# Patient Record
Sex: FEMALE | Race: WHITE | Employment: OTHER | URBAN - METROPOLITAN AREA
[De-identification: names, ages, dates, MRNs, and addresses within clinical notes are randomized per-mention and may not be internally consistent; named-entity substitution may affect disease eponyms.]

---

## 2017-01-11 ENCOUNTER — LAB ENCOUNTER (OUTPATIENT)
Dept: LAB | Age: 57
End: 2017-01-11
Attending: INTERNAL MEDICINE
Payer: COMMERCIAL

## 2017-01-11 DIAGNOSIS — I13.10: ICD-10-CM

## 2017-01-11 DIAGNOSIS — E83.42 HYPOMAGNESEMIA: Primary | ICD-10-CM

## 2017-01-11 LAB
ALBUMIN SERPL BCP-MCNC: 3.1 G/DL (ref 3.5–4.8)
ALP SERPL-CCNC: 87 U/L (ref 32–100)
ALT SERPL-CCNC: 15 U/L (ref 14–54)
ANION GAP SERPL CALC-SCNC: 10 MMOL/L (ref 0–18)
AST SERPL-CCNC: 19 U/L (ref 15–41)
BASOPHILS # BLD: 0 K/UL (ref 0–0.2)
BASOPHILS NFR BLD: 0 %
BILIRUB DIRECT SERPL-MCNC: 0 MG/DL (ref 0–0.2)
BILIRUB SERPL-MCNC: 0.5 MG/DL (ref 0.3–1.2)
BILIRUB UR QL: NEGATIVE
BUN SERPL-MCNC: 42 MG/DL (ref 8–20)
BUN/CREAT SERPL: 15.7 (ref 10–20)
CALCIUM SERPL-MCNC: 9.3 MG/DL (ref 8.5–10.5)
CHLORIDE SERPL-SCNC: 106 MMOL/L (ref 95–110)
CHOLEST SERPL-MCNC: 126 MG/DL (ref 110–200)
CLARITY UR: CLEAR
CO2 SERPL-SCNC: 23 MMOL/L (ref 22–32)
COLOR UR: YELLOW
CREAT SERPL-MCNC: 2.68 MG/DL (ref 0.5–1.5)
EOSINOPHIL # BLD: 0.1 K/UL (ref 0–0.7)
EOSINOPHIL NFR BLD: 2 %
ERYTHROCYTE [DISTWIDTH] IN BLOOD BY AUTOMATED COUNT: 14.7 % (ref 11–15)
GLUCOSE SERPL-MCNC: 134 MG/DL (ref 70–99)
GLUCOSE UR-MCNC: NEGATIVE MG/DL
HCT VFR BLD AUTO: 33.1 % (ref 35–48)
HDLC SERPL-MCNC: 41 MG/DL
HGB BLD-MCNC: 10.5 G/DL (ref 12–16)
HGB UR QL STRIP.AUTO: NEGATIVE
HYALINE CASTS #/AREA URNS AUTO: 1 /LPF
KETONES UR-MCNC: NEGATIVE MG/DL
LDLC SERPL CALC-MCNC: 65 MG/DL (ref 0–99)
LEUKOCYTE ESTERASE UR QL STRIP.AUTO: NEGATIVE
LYMPHOCYTES # BLD: 1.1 K/UL (ref 1–4)
LYMPHOCYTES NFR BLD: 22 %
MAGNESIUM SERPL-MCNC: 1.6 MG/DL (ref 1.8–2.5)
MCH RBC QN AUTO: 28.3 PG (ref 27–32)
MCHC RBC AUTO-ENTMCNC: 31.7 G/DL (ref 32–37)
MCV RBC AUTO: 89.3 FL (ref 80–100)
MONOCYTES # BLD: 0.4 K/UL (ref 0–1)
MONOCYTES NFR BLD: 8 %
NEUTROPHILS # BLD AUTO: 3.5 K/UL (ref 1.8–7.7)
NEUTROPHILS NFR BLD: 67 %
NITRITE UR QL STRIP.AUTO: NEGATIVE
NONHDLC SERPL-MCNC: 85 MG/DL
OSMOLALITY UR CALC.SUM OF ELEC: 300 MOSM/KG (ref 275–295)
PH UR: 7 [PH] (ref 5–8)
PLATELET # BLD AUTO: 191 K/UL (ref 140–400)
PMV BLD AUTO: 11.8 FL (ref 7.4–10.3)
POTASSIUM SERPL-SCNC: 5.5 MMOL/L (ref 3.3–5.1)
PROT SERPL-MCNC: 5.8 G/DL (ref 5.9–8.4)
PROT UR-MCNC: 100 MG/DL
PROT UR-MCNC: 210 MG/DL
PTH-INTACT SERPL-MCNC: 180 PG/ML (ref 12–88)
RBC # BLD AUTO: 3.71 M/UL (ref 3.7–5.4)
RBC #/AREA URNS AUTO: <1 /HPF
SODIUM SERPL-SCNC: 139 MMOL/L (ref 136–144)
SP GR UR STRIP: 1.01 (ref 1–1.03)
TRIGL SERPL-MCNC: 101 MG/DL (ref 1–149)
UROBILINOGEN UR STRIP-ACNC: <2
VIT C UR-MCNC: NEGATIVE MG/DL
WBC # BLD AUTO: 5.2 K/UL (ref 4–11)
WBC #/AREA URNS AUTO: <1 /HPF

## 2017-01-11 PROCEDURE — 80076 HEPATIC FUNCTION PANEL: CPT

## 2017-01-11 PROCEDURE — 80048 BASIC METABOLIC PNL TOTAL CA: CPT

## 2017-01-11 PROCEDURE — 84166 PROTEIN E-PHORESIS/URINE/CSF: CPT

## 2017-01-11 PROCEDURE — 84156 ASSAY OF PROTEIN URINE: CPT

## 2017-01-11 PROCEDURE — 83735 ASSAY OF MAGNESIUM: CPT

## 2017-01-11 PROCEDURE — 81001 URINALYSIS AUTO W/SCOPE: CPT

## 2017-01-11 PROCEDURE — 80158 DRUG ASSAY CYCLOSPORINE: CPT

## 2017-01-11 PROCEDURE — 86335 IMMUNFIX E-PHORSIS/URINE/CSF: CPT

## 2017-01-11 PROCEDURE — 82306 VITAMIN D 25 HYDROXY: CPT

## 2017-01-11 PROCEDURE — 36415 COLL VENOUS BLD VENIPUNCTURE: CPT

## 2017-01-11 PROCEDURE — 85025 COMPLETE CBC W/AUTO DIFF WBC: CPT

## 2017-01-11 PROCEDURE — 80061 LIPID PANEL: CPT

## 2017-01-11 PROCEDURE — 83970 ASSAY OF PARATHORMONE: CPT

## 2017-01-12 LAB — CYCLOSPORINE A: 29.4 NG/ML

## 2017-01-13 LAB — 25(OH)D3 SERPL-MCNC: 25.7 NG/ML

## 2017-02-26 ENCOUNTER — LAB ENCOUNTER (OUTPATIENT)
Dept: LAB | Facility: HOSPITAL | Age: 57
End: 2017-02-26
Attending: INTERNAL MEDICINE
Payer: COMMERCIAL

## 2017-02-26 DIAGNOSIS — D64.9 ANEMIA, UNSPECIFIED: ICD-10-CM

## 2017-02-26 DIAGNOSIS — Z94.83 PANCREAS TRANSPLANTED (HCC): ICD-10-CM

## 2017-02-26 DIAGNOSIS — Z94.0 KIDNEY TRANSPLANTED: ICD-10-CM

## 2017-02-26 DIAGNOSIS — T86.899 UNSPECIFIED COMPLICATION OF OTHER TRANSPLANTED TISSUE: ICD-10-CM

## 2017-02-26 DIAGNOSIS — Z79.899 ON LONG TERM DRUG THERAPY: ICD-10-CM

## 2017-02-26 DIAGNOSIS — R19.7 DIARRHEA, UNSPECIFIED TYPE: ICD-10-CM

## 2017-02-26 DIAGNOSIS — E55.9 VITAMIN D DEFICIENCY, UNSPECIFIED: ICD-10-CM

## 2017-02-26 DIAGNOSIS — T86.10 UNSPECIFIED COMPLICATION OF KIDNEY TRANSPLANT: ICD-10-CM

## 2017-02-26 LAB
ANION GAP SERPL CALC-SCNC: 7 MMOL/L (ref 0–18)
BASOPHILS # BLD: 0 K/UL (ref 0–0.2)
BASOPHILS NFR BLD: 0 %
BILIRUB UR QL: NEGATIVE
BUN SERPL-MCNC: 45 MG/DL (ref 8–20)
BUN/CREAT SERPL: 17 (ref 10–20)
CALCIUM SERPL-MCNC: 8.5 MG/DL (ref 8.5–10.5)
CHLORIDE SERPL-SCNC: 109 MMOL/L (ref 95–110)
CLARITY UR: CLEAR
CO2 SERPL-SCNC: 22 MMOL/L (ref 22–32)
COLOR UR: YELLOW
CREAT SERPL-MCNC: 2.65 MG/DL (ref 0.5–1.5)
EOSINOPHIL # BLD: 0.2 K/UL (ref 0–0.7)
EOSINOPHIL NFR BLD: 3 %
ERYTHROCYTE [DISTWIDTH] IN BLOOD BY AUTOMATED COUNT: 13.9 % (ref 11–15)
GLUCOSE SERPL-MCNC: 156 MG/DL (ref 70–99)
HCT VFR BLD AUTO: 32.4 % (ref 35–48)
HGB BLD-MCNC: 10.1 G/DL (ref 12–16)
HGB UR QL STRIP.AUTO: NEGATIVE
KETONES UR-MCNC: NEGATIVE MG/DL
LYMPHOCYTES # BLD: 1.1 K/UL (ref 1–4)
LYMPHOCYTES NFR BLD: 19 %
MCH RBC QN AUTO: 28.2 PG (ref 27–32)
MCHC RBC AUTO-ENTMCNC: 31.3 G/DL (ref 32–37)
MCV RBC AUTO: 90.2 FL (ref 80–100)
MONOCYTES # BLD: 0.4 K/UL (ref 0–1)
MONOCYTES NFR BLD: 7 %
NEUTROPHILS # BLD AUTO: 4.1 K/UL (ref 1.8–7.7)
NEUTROPHILS NFR BLD: 71 %
NITRITE UR QL STRIP.AUTO: NEGATIVE
OSMOLALITY UR CALC.SUM OF ELEC: 301 MOSM/KG (ref 275–295)
PH UR: 5 [PH] (ref 5–8)
PLATELET # BLD AUTO: 204 K/UL (ref 140–400)
PMV BLD AUTO: 11 FL (ref 7.4–10.3)
POTASSIUM SERPL-SCNC: 5.1 MMOL/L (ref 3.3–5.1)
PROT UR-MCNC: 100 MG/DL
PROT UR-MCNC: 325 MG/DL
RBC # BLD AUTO: 3.59 M/UL (ref 3.7–5.4)
RBC #/AREA URNS AUTO: 2 /HPF
SODIUM SERPL-SCNC: 138 MMOL/L (ref 136–144)
SP GR UR STRIP: 1.01 (ref 1–1.03)
UROBILINOGEN UR STRIP-ACNC: <2
VIT C UR-MCNC: NEGATIVE MG/DL
WBC # BLD AUTO: 5.8 K/UL (ref 4–11)
WBC #/AREA URNS AUTO: 3 /HPF

## 2017-02-26 PROCEDURE — 85025 COMPLETE CBC W/AUTO DIFF WBC: CPT

## 2017-02-26 PROCEDURE — 84166 PROTEIN E-PHORESIS/URINE/CSF: CPT

## 2017-02-26 PROCEDURE — 36415 COLL VENOUS BLD VENIPUNCTURE: CPT

## 2017-02-26 PROCEDURE — 81001 URINALYSIS AUTO W/SCOPE: CPT

## 2017-02-26 PROCEDURE — 86335 IMMUNFIX E-PHORSIS/URINE/CSF: CPT

## 2017-02-26 PROCEDURE — 80158 DRUG ASSAY CYCLOSPORINE: CPT

## 2017-02-26 PROCEDURE — 80048 BASIC METABOLIC PNL TOTAL CA: CPT

## 2017-02-28 ENCOUNTER — OFFICE VISIT (OUTPATIENT)
Dept: NEPHROLOGY | Facility: CLINIC | Age: 57
End: 2017-02-28

## 2017-02-28 VITALS
DIASTOLIC BLOOD PRESSURE: 80 MMHG | BODY MASS INDEX: 25 KG/M2 | SYSTOLIC BLOOD PRESSURE: 104 MMHG | RESPIRATION RATE: 16 BRPM | HEART RATE: 64 BPM | WEIGHT: 139 LBS

## 2017-02-28 DIAGNOSIS — I10 ESSENTIAL HYPERTENSION: ICD-10-CM

## 2017-02-28 DIAGNOSIS — N18.4 CKD (CHRONIC KIDNEY DISEASE), STAGE 4 (SEVERE): Primary | ICD-10-CM

## 2017-02-28 DIAGNOSIS — Z94.0 KIDNEY TRANSPLANT RECIPIENT: ICD-10-CM

## 2017-02-28 LAB — CYCLOSPORINE A: 36.8 NG/ML

## 2017-02-28 PROCEDURE — 99214 OFFICE O/P EST MOD 30 MIN: CPT | Performed by: INTERNAL MEDICINE

## 2017-02-28 RX ORDER — CYCLOSPORINE 25 MG/1
25 CAPSULE, LIQUID FILLED ORAL 2 TIMES DAILY
COMMUNITY
End: 2018-09-21 | Stop reason: ALTCHOICE

## 2017-02-28 RX ORDER — PENTAMIDINE ISETHIONATE 300 MG/300MG
300 INHALANT RESPIRATORY (INHALATION)
COMMUNITY
End: 2019-09-25

## 2017-02-28 RX ORDER — PRAVASTATIN SODIUM 20 MG
60 TABLET ORAL NIGHTLY
COMMUNITY

## 2017-02-28 NOTE — PROGRESS NOTES
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)      Clotting Factor 5   • Spondylolysis 2015   • Tendon tear, foot 2015     R foot   • Trigger finger, right little finger 2015   • Anemia 2014   • Trigger finger, left 2014 mycophenolate mofetil 250 MG Oral Cap Take 500 mg by mouth 2 (two) times daily. Disp:  Rfl:    Prasugrel HCl 10 MG Oral Tab Take 10 mg by mouth daily. Disp:  Rfl:    aspirin 81 MG Oral Tab Take 81 mg by mouth daily.  Disp:  Rfl:    Bisoprolol Fumarate 5 M Warm and dry, no rashes      Michael-Vidal Fernando Cha, MD  2/28/2016  156 PM

## 2017-04-30 ENCOUNTER — LAB ENCOUNTER (OUTPATIENT)
Dept: LAB | Facility: HOSPITAL | Age: 57
End: 2017-04-30
Attending: INTERNAL MEDICINE
Payer: COMMERCIAL

## 2017-04-30 DIAGNOSIS — Z94.0 KIDNEY TRANSPLANTED: ICD-10-CM

## 2017-04-30 DIAGNOSIS — Z79.899 ON LONG TERM DRUG THERAPY: ICD-10-CM

## 2017-04-30 PROCEDURE — 80158 DRUG ASSAY CYCLOSPORINE: CPT

## 2017-04-30 PROCEDURE — 83970 ASSAY OF PARATHORMONE: CPT

## 2017-04-30 PROCEDURE — 80061 LIPID PANEL: CPT

## 2017-04-30 PROCEDURE — 80048 BASIC METABOLIC PNL TOTAL CA: CPT

## 2017-04-30 PROCEDURE — 84100 ASSAY OF PHOSPHORUS: CPT

## 2017-04-30 PROCEDURE — 87799 DETECT AGENT NOS DNA QUANT: CPT

## 2017-04-30 PROCEDURE — 80076 HEPATIC FUNCTION PANEL: CPT

## 2017-04-30 PROCEDURE — 36415 COLL VENOUS BLD VENIPUNCTURE: CPT

## 2017-04-30 PROCEDURE — 82306 VITAMIN D 25 HYDROXY: CPT

## 2017-04-30 PROCEDURE — 83036 HEMOGLOBIN GLYCOSYLATED A1C: CPT

## 2017-04-30 PROCEDURE — 83735 ASSAY OF MAGNESIUM: CPT

## 2017-04-30 PROCEDURE — 84156 ASSAY OF PROTEIN URINE: CPT

## 2017-04-30 PROCEDURE — 85025 COMPLETE CBC W/AUTO DIFF WBC: CPT

## 2017-04-30 PROCEDURE — 82570 ASSAY OF URINE CREATININE: CPT

## 2017-08-31 ENCOUNTER — TELEPHONE (OUTPATIENT)
Dept: NEPHROLOGY | Facility: CLINIC | Age: 57
End: 2017-08-31

## 2017-08-31 ENCOUNTER — OFFICE VISIT (OUTPATIENT)
Dept: NEPHROLOGY | Facility: CLINIC | Age: 57
End: 2017-08-31

## 2017-08-31 VITALS — WEIGHT: 133 LBS | BODY MASS INDEX: 24 KG/M2

## 2017-08-31 DIAGNOSIS — T86.11 CHRONIC RENAL ALLOGRAFT NEPHROPATHY: ICD-10-CM

## 2017-08-31 DIAGNOSIS — N18.4 CKD (CHRONIC KIDNEY DISEASE) STAGE 4, GFR 15-29 ML/MIN (HCC): Primary | ICD-10-CM

## 2017-08-31 DIAGNOSIS — Z94.0 KIDNEY TRANSPLANT RECIPIENT: ICD-10-CM

## 2017-08-31 PROCEDURE — 99214 OFFICE O/P EST MOD 30 MIN: CPT | Performed by: INTERNAL MEDICINE

## 2017-08-31 NOTE — PROGRESS NOTES
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 Per pump Disp:  Rfl:    mycophenolate mofetil 250 MG Oral Cap Take 500 mg by mouth 2 (two) times daily. Disp:  Rfl:    Prasugrel HCl 10 MG Oral Tab Take 10 mg by mouth daily.  Disp:  Rfl:    aspirin 81 MG Oral Tab Ta without wheezes, rales, rhonchi. Abdomen: Soft, non-tender. + bowel sounds, no palpable organomegaly  Extremities: Without clubbing, cyanosis or edema.   Neurologic: Alert and oriented, normal affect, cranial nerves grossly intact, moving all extremities

## 2017-10-09 ENCOUNTER — TELEPHONE (OUTPATIENT)
Dept: NEPHROLOGY | Facility: CLINIC | Age: 57
End: 2017-10-09

## 2017-10-09 DIAGNOSIS — Z94.0 KIDNEY TRANSPLANT RECIPIENT: ICD-10-CM

## 2017-10-09 DIAGNOSIS — N18.4 CKD (CHRONIC KIDNEY DISEASE) STAGE 4, GFR 15-29 ML/MIN (HCC): Primary | ICD-10-CM

## 2017-11-08 ENCOUNTER — TELEPHONE (OUTPATIENT)
Dept: NEPHROLOGY | Facility: CLINIC | Age: 57
End: 2017-11-08

## 2017-11-08 ENCOUNTER — LAB ENCOUNTER (OUTPATIENT)
Dept: LAB | Age: 57
End: 2017-11-08
Attending: INTERNAL MEDICINE
Payer: COMMERCIAL

## 2017-11-08 DIAGNOSIS — N18.4 CHRONIC KIDNEY DISEASE, STAGE IV (SEVERE) (HCC): ICD-10-CM

## 2017-11-08 DIAGNOSIS — Z94.0 S/P KIDNEY TRANSPLANT: ICD-10-CM

## 2017-11-08 PROCEDURE — 80158 DRUG ASSAY CYCLOSPORINE: CPT

## 2017-11-08 PROCEDURE — 36415 COLL VENOUS BLD VENIPUNCTURE: CPT

## 2017-11-08 PROCEDURE — 80048 BASIC METABOLIC PNL TOTAL CA: CPT

## 2017-11-08 PROCEDURE — 85025 COMPLETE CBC W/AUTO DIFF WBC: CPT

## 2017-11-11 ENCOUNTER — TELEPHONE (OUTPATIENT)
Dept: NEPHROLOGY | Facility: CLINIC | Age: 57
End: 2017-11-11

## 2017-11-27 ENCOUNTER — TELEPHONE (OUTPATIENT)
Dept: TRANSPLANT | Facility: CLINIC | Age: 57
End: 2017-11-27

## 2017-11-27 DIAGNOSIS — I25.10 CARDIOVASCULAR DISEASE: ICD-10-CM

## 2017-11-27 DIAGNOSIS — E10.9 TYPE 1 DIABETES MELLITUS (H): ICD-10-CM

## 2017-11-27 DIAGNOSIS — I73.9 PVD (PERIPHERAL VASCULAR DISEASE) (H): ICD-10-CM

## 2017-11-27 DIAGNOSIS — N18.9 CHRONIC RENAL FAILURE: ICD-10-CM

## 2017-11-27 DIAGNOSIS — Z85.828 HISTORY OF SKIN CANCER: ICD-10-CM

## 2017-11-27 DIAGNOSIS — T86.12 KIDNEY TRANSPLANT FAILURE: ICD-10-CM

## 2017-11-27 DIAGNOSIS — Z76.82 ORGAN TRANSPLANT CANDIDATE: ICD-10-CM

## 2017-11-27 NOTE — TELEPHONE ENCOUNTER
Shadia called back and was extremely rude to me and hung up on me because I asker her who all her doctors were.

## 2017-11-27 NOTE — TELEPHONE ENCOUNTER
recieved an email from Bessie Fam stating that patient needs to be evaluated for kidney transplant, I attempted to contact her and reached her VM. I LM with my name, number and hours

## 2017-11-27 NOTE — LETTER
11/28/17    Shadia Rosario  [address] [place] [address]          Dear Shadia,    Thank you for your interest in the Transplant Center at [facility]. We look forward to being a part your care team and assisting you through the transplant process.    As we discussed, your transplant coordinator is Bessie Zhang ([phone]).  You may call your coordinator at any time with questions or concerns. Your first scheduled call will be on 12/11/2017 between 2-4pm and your tentative evaluation date is 3/19/2018.  If this needs to change, call [phone].    Please complete the following.    1. Sign up for:    [employer], your electronic medical record    [employer], the Transplant Center's website (see enclosed booklet)    You can use these tools to learn more about your transplant, communicate with your care team, and track your medical details    2. Fill out and return the enclosed forms    Authorization for Electronic Communication    Authorization to Discuss Protected Health Information    [employer] Release of Information       Best Wishes,      Solid Organ Transplant Intake  [facility]    cc: Referring Physician        PCP

## 2017-11-28 NOTE — TELEPHONE ENCOUNTER
Intake Progress Note  Nurse Call: 2017 2-4pm on cell phone  Save the Date: 3/19/2018 w/?    Organ:  Kidney    Referral Came Via phone call from patient    Referring Physician (outside provider, if patient does not remember the name of provider contact clinic or dialysis unit to get this information) :  Dr. Karla Packer in  [place]  (If inside referral, ask who their community nephrologist is that sent the to [facility])   (PCP)Chano Link  Assigned Coordinator:  Bessie Zhang    Reported Diagnosis that caused the kidney failure ( not CKD)  Kidney Failure, second assessment    Best time patient can be reached:  Anytime on cell    How would you like to be communicated with, through MyChart, phone, or mail? phone      Records:  [facility] requested from: [facility]    Insurance information:  BCBS/Medicare  Policy murrieta:  Pk Sorto  1961, Self-Medicare  Subscriber/policy/ID number:  [id number]  Group Number:  [id number]    History of diabetes:  yes  Type one  If yes, age of onset:  4 yrs old    Do you have an endocrinologist?: yes  Name and Location:  [facility]         On insulin or oral medication:  Yes on oral meds          What type of insulin and how many units? Pump 19 units a day of Novalog          History of a kidney biopsy:  Yes,          If Yes,when and where:   @ [facility]    Past Medical and Surgical History (updated in Epic medical / surgical):             History of  cancer personally:  Yes Wrist that was removed What type? Cancer on WristWrist that was  removed                Where and when was it treated? Dermatologist @ [facility] in  [place], [place]                            History of cardiac events:  yes             When and where was it was it treated?  heart attack, x2 stents placement  [facility] in       [place], [place]             History abdominal surgeries other than previous transplant: yes What type?  Gallbladder removed              Where and when? 2011  [facility]              History of previous transplant: yes             If Yes where and estimated date: [facility] 1996             Listed or in eval at another transplant center?  Yes, [facility]             History of hospitalization in last 12 months:  no               If Yes:  n/a               History of blood transfusion:  yes               Smoking history:  no     Current smoker:  No  How much? n/a      Quit Date:  n/a    On dialysis: no Where:  n/a                 Type of Dialysis: n/a   Start date: n/a       Dialysis Days:  n/a       If NYOD, estimated GFR:  ?  Height:  5'3  Weight:  129  BMI:  23    Health Maintenance:  PAP:  2017  Mammo: 2017  Colon: 9/ 2017  PSA:  n/a  Dental: 10/2017  Vaccines: Up to date  Special Needs (ie--wheelchair, assistance, guardian, interpretor):  no    As for the next steps, as long as we are able to get financial approval and receive your medical records, you should be expecting a call from your Transplant Coordinator in about 2 weeks. Your Transplant Coordinator will go into more detail about the evaluation process, but we can put a hold on a tentative date for your transplant evaluation now. Kidney (K/P) evaluations are scheduled for Monday, Wednesday, or Thursdays, and can start as early as 6:30 am and end as late as 4:30pm. Would one of these days work better for you? Great, I am going to put a hold on Day/Month for you. Again, this appointment day and time is subject to change and is dependent on financial approval from your insurance company and our receiving your medical records so we are able to meet your individual needs.    I also want to schedule your first call with the coordinator. (Offer a couple choices and schedule patient's preferred date and time.)      Inform patient on the need to arrange age appropriate cancer screening, vaccines up to date and dental clearance    Reviewed evaluation  process and reminded patient to complete questionnaire, complete medical records release, and review packet prior to evaluation visit     Informed patient that coordinator will review their chart and insurance coverage and if no concerns they will receive a call from a  to schedule evaluation

## 2017-11-29 NOTE — TELEPHONE ENCOUNTER
Intake Progress Note  Nurse Call: 2017 2-4pm on cell phone  Save the Date: 3/19/2018 w/?    Organ:  Kidney    Referral Came Via phone call from patient    Referring Physician (outside provider, if patient does not remember the name of provider contact clinic or dialysis unit to get this information) :  Dr. Karla Packer in  [place]  (If inside referral, ask who their community nephrologist is that sent the to [facility])   (PCP)Chano Link  Assigned Coordinator:  Bessie Zhang    Reported Diagnosis that caused the kidney failure ( not CKD)  Kidney Failure, second assessment    Best time patient can be reached:  Anytime on cell    How would you like to be communicated with, through MyChart, phone, or mail? phone      Records:  [facility] requested from: [facility]    Insurance information:  BCBS/Medicare  Policy murrieta:  Pk Sorto  1961, Self-Medicare  Subscriber/policy/ID number:  [id number]/ [id number]  Group Number:  [id number]    History of diabetes:  yes  Type one  If yes, age of onset:  4 yrs old    Do you have an endocrinologist?: yes  Name and Location:  [facility]         On insulin or oral medication:  Yes on oral meds          What type of insulin and how many units? Pump 19 units a day of Novalog          History of a kidney biopsy:  Yes,          If Yes,when and where:   @ [facility]    Past Medical and Surgical History (updated in Epic medical / surgical):             History of  cancer personally:  Yes Wrist that was removed What type? Cancer on WristWrist that was  removed                Where and when was it treated? Dermatologist @ [facility] in  [place], [place]                            History of cardiac events:  yes             When and where was it was it treated?  heart attack, x2 stents placement  [facility] in       [place], [place]             History abdominal surgeries other than previous transplant: yes What type?  Gallbladder removed              Where and when? 2011  [facility]              History of previous transplant: yes             If Yes where and estimated date: [facility] 1996             Listed or in eval at another transplant center?  Yes, [facility]             History of hospitalization in last 12 months:  no               If Yes:  n/a               History of blood transfusion:  yes               Smoking history:  no     Current smoker:  No  How much? n/a      Quit Date:  n/a    On dialysis: no Where:  n/a                 Type of Dialysis: n/a   Start date: n/a       Dialysis Days:  n/a       If NYOD, estimated GFR:  ?  Height:  5'3  Weight:  129  BMI:  23    Health Maintenance:  PAP:  2017  Mammo: 2017  Colon: 9/ 2017  PSA:  n/a  Dental: 10/2017  Vaccines: Up to date  Special Needs (ie--wheelchair, assistance, guardian, interpretor):  no    As for the next steps, as long as we are able to get financial approval and receive your medical records, you should be expecting a call from your Transplant Coordinator in about 2 weeks. Your Transplant Coordinator will go into more detail about the evaluation process, but we can put a hold on a tentative date for your transplant evaluation now. Kidney (K/P) evaluations are scheduled for Monday, Wednesday, or Thursdays, and can start as early as 6:30 am and end as late as 4:30pm. Would one of these days work better for you? Great, I am going to put a hold on Day/Month for you. Again, this appointment day and time is subject to change and is dependent on financial approval from your insurance company and our receiving your medical records so we are able to meet your individual needs.    I also want to schedule your first call with the coordinator. (Offer a couple choices and schedule patient's preferred date and time.)      Inform patient on the need to arrange age appropriate cancer screening, vaccines up to date and dental clearance    Reviewed evaluation  process and reminded patient to complete questionnaire, complete medical records release, and review packet prior to evaluation visit     Informed patient that coordinator will review their chart and insurance coverage and if no concerns they will receive a call from a  to schedule evaluation

## 2017-11-30 ENCOUNTER — MEDICAL CORRESPONDENCE (OUTPATIENT)
Dept: TRANSPLANT | Facility: CLINIC | Age: 57
End: 2017-11-30

## 2017-12-01 ENCOUNTER — MEDICAL CORRESPONDENCE (OUTPATIENT)
Dept: TRANSPLANT | Facility: CLINIC | Age: 57
End: 2017-12-01

## 2017-12-06 ENCOUNTER — MEDICAL CORRESPONDENCE (OUTPATIENT)
Dept: TRANSPLANT | Facility: CLINIC | Age: 57
End: 2017-12-06

## 2017-12-07 ENCOUNTER — MEDICAL CORRESPONDENCE (OUTPATIENT)
Dept: TRANSPLANT | Facility: CLINIC | Age: 57
End: 2017-12-07

## 2017-12-07 ENCOUNTER — TELEPHONE (OUTPATIENT)
Dept: TRANSPLANT | Facility: CLINIC | Age: 57
End: 2017-12-07

## 2017-12-11 NOTE — TELEPHONE ENCOUNTER
"Conversation with Shadia who explains her situation.  She has been working with [facility] transplant and says she has an approved living donor but she does not want to be transplanted there because they use Prednisone and she does not want to take that ever again.  She states she is not a candidate to be listed at [facility] because her vascular problems are too severe to be a candidate for an unplanned  donor transplant.  She says that [facility] told her that she would have to be opened before the living donor in case they can't find a spot.  She says they verbalized that they think there is a spot to place another transplant on her right side (current kidney is on the left).  I told her we would get the image of the CT she had 2017 which shows her severe atherosclerotic vessels and I would have our surgeons review before she travels here.  She is not requiring dialysis yet (GFR around 16).  She has had several cardiac stents and had a heart cath at [facility] in [place] in October and saw her own Cardiologist there; no intervention recommended (his note doesn't exactly state \"cleared for transplant\").  She says she hasn't talked to anyone at [facility] since this was performed so assumes they accept it.  She asked if she could get cardiac clearance here without actually having to see a cardiologist; I told her she would have to be seen in clinic in order for them to gustavo clearance but they will have the cath film from October to review.  She wants to come to [place] the week of  because her  is on spring break ().  I described the typical evaluation process.  She will need 2 consecutive days.  Orders to .    Health maintenance:  She says she is up to date with pap/Eli to request.  Mammogram negative May 2017 (report here).  Colonoscopy 2017 (report here).  She reports being up to date with dental (I need to review this with her).  Vaccines; we have documentation of " "Pneumovax 2016 and she reports that she is in process of completing the Hep B series.  *She has h/o skin CA and says she is up to date with seeing her Dermatologist/Eli to request records.      *I reminded Shadia that she has been on our Pancreas list (on hold) for many years and asked her what her intentions were regarding that now that needs another kidney transplant; I asked her if she was thinking of SPK or [facility]UEL?  She states that she is aware that her vascular disease is too serious now to make a pancreas transplant possible to she is no longer interested in that.  So I made the following statement to her:  \"So you are telling me to remove you from the Pancreas list?\"   She stated:  \"Yes, remove me completely from the Pancreas list.\"  I shared this message with Kaitlyn Alcocer.  "

## 2017-12-13 ENCOUNTER — MEDICAL CORRESPONDENCE (OUTPATIENT)
Dept: TRANSPLANT | Facility: CLINIC | Age: 57
End: 2017-12-13

## 2017-12-13 ENCOUNTER — COMMITTEE REVIEW (OUTPATIENT)
Dept: TRANSPLANT | Facility: CLINIC | Age: 57
End: 2017-12-13

## 2017-12-13 ENCOUNTER — TELEPHONE (OUTPATIENT)
Dept: TRANSPLANT | Facility: CLINIC | Age: 57
End: 2017-12-13

## 2017-12-13 NOTE — COMMITTEE REVIEW
Abdominal Patient Discussion Note Transplant Coordinator: Zuleima Barnes  Transplant Surgeon:       Referring Physician:     Committee Review Members:  Nephrology Saud Ferreira MD, Luis Zavala, APRN CNP, Romel Sharma MD   Nutrition Kaylee Ware,    Pharmacy Evelyn Regalado, [facility]    - Clinical Mónica Morales, [facility], Merary Clark, [facility]   Transplant Anastasia Bell PA-C, Adrien Garcia MD, Nga Adam LPN, Eugenie Ramos RN, Betsy Segura MD, Mauricio Lewis MD, Azeb Balderrama RN       Additional Discussion Notes and Findings:  Patient remains listed inactive status on the pancreas list per her choice and is now self-referred for another kidney transplant. Reviewed patient's conversation with pre-kidney coordinator in which patient requested to be removed from the pancreas transplant wait list.  Team agrees with patient's decision to be removed from the pancreas transplant wait list.

## 2017-12-13 NOTE — TELEPHONE ENCOUNTER
Updated patient that I will be removing her name from the [facility] pancreas transplant list per her request and that I will send patient a letter confirming removal from pancreas list on 12/13/17.   Patient verbalizes understanding and is agreeable with this plan

## 2017-12-13 NOTE — LETTER
December 13, 2017    Shadia Rosario  [address] [place] [address]      Dear Ms. Rosario,    The purpose of this letter is to let you know that per your request, your name was removed from the Pancreas transplant list at the [facility] on December 13, 2017.   We recommend that you continue to follow up with your primary care and referring physicians in order to manage your health concerns.  Enclosed is a letter from [facility] which describes the services offered to patients by [facility] and the [facility].  If you have any questions regarding this letter, please contact me at [phone].  Thank you for allowing us to participate in your care.      Sincerely,  Kaitlyn Alcocer, RN, BSN  Transplant Coordinator    Enclosure:  [facility] Letter

## 2017-12-14 ENCOUNTER — MEDICAL CORRESPONDENCE (OUTPATIENT)
Dept: TRANSPLANT | Facility: CLINIC | Age: 57
End: 2017-12-14

## 2017-12-15 ENCOUNTER — MEDICAL CORRESPONDENCE (OUTPATIENT)
Dept: TRANSPLANT | Facility: CLINIC | Age: 57
End: 2017-12-15

## 2018-01-10 ENCOUNTER — TELEPHONE (OUTPATIENT)
Dept: TRANSPLANT | Facility: CLINIC | Age: 58
End: 2018-01-10

## 2018-01-10 NOTE — TELEPHONE ENCOUNTER
Message on my voicemail from Shadia.  She states she wants to cancel her evaluation that is scheduled here for March 2018.  She states she has decided to work with [facility].  Message to Devon.  This case closed/resolved.

## 2018-01-29 ENCOUNTER — TELEPHONE (OUTPATIENT)
Dept: NEPHROLOGY | Facility: CLINIC | Age: 58
End: 2018-01-29

## 2018-01-30 NOTE — TELEPHONE ENCOUNTER
D/w pt- stopping magnesium did not help diarrhea; ok to use colestipol; to call back in 2 weeks with onelia- stephane llamas

## 2018-02-10 ENCOUNTER — LAB ENCOUNTER (OUTPATIENT)
Dept: LAB | Age: 58
End: 2018-02-10
Attending: INTERNAL MEDICINE
Payer: COMMERCIAL

## 2018-02-10 DIAGNOSIS — Z94.0 KIDNEY TRANSPLANTED: ICD-10-CM

## 2018-02-10 DIAGNOSIS — N18.4 CHRONIC KIDNEY DISEASE, STAGE IV (SEVERE) (HCC): ICD-10-CM

## 2018-02-10 DIAGNOSIS — Z79.899 ON LONG TERM DRUG THERAPY: ICD-10-CM

## 2018-02-10 DIAGNOSIS — Z94.0 S/P KIDNEY TRANSPLANT: ICD-10-CM

## 2018-02-10 LAB
ALBUMIN SERPL BCP-MCNC: 3 G/DL (ref 3.5–4.8)
ALP SERPL-CCNC: 89 U/L (ref 32–100)
ALT SERPL-CCNC: 18 U/L (ref 14–54)
ANION GAP SERPL CALC-SCNC: 11 MMOL/L (ref 0–18)
AST SERPL-CCNC: 17 U/L (ref 15–41)
BASOPHILS # BLD: 0 K/UL (ref 0–0.2)
BASOPHILS NFR BLD: 1 %
BILIRUB DIRECT SERPL-MCNC: 0.1 MG/DL (ref 0–0.2)
BILIRUB SERPL-MCNC: 0.5 MG/DL (ref 0.3–1.2)
BUN SERPL-MCNC: 49 MG/DL (ref 8–20)
BUN/CREAT SERPL: 14.9 (ref 10–20)
CALCIUM SERPL-MCNC: 8.2 MG/DL (ref 8.5–10.5)
CHLORIDE SERPL-SCNC: 110 MMOL/L (ref 95–110)
CHOLEST SERPL-MCNC: 93 MG/DL (ref 110–200)
CO2 SERPL-SCNC: 19 MMOL/L (ref 22–32)
CREAT SERPL-MCNC: 3.28 MG/DL (ref 0.5–1.5)
EOSINOPHIL # BLD: 0.2 K/UL (ref 0–0.7)
EOSINOPHIL NFR BLD: 4 %
ERYTHROCYTE [DISTWIDTH] IN BLOOD BY AUTOMATED COUNT: 15.2 % (ref 11–15)
GLUCOSE SERPL-MCNC: 148 MG/DL (ref 70–99)
HBA1C MFR BLD: 6.6 % (ref 4–6)
HCT VFR BLD AUTO: 28.2 % (ref 35–48)
HDLC SERPL-MCNC: 33 MG/DL
HGB BLD-MCNC: 9 G/DL (ref 12–16)
LDLC SERPL CALC-MCNC: 40 MG/DL (ref 0–99)
LYMPHOCYTES # BLD: 0.9 K/UL (ref 1–4)
LYMPHOCYTES NFR BLD: 21 %
MAGNESIUM SERPL-MCNC: 1.1 MG/DL (ref 1.8–2.5)
MCH RBC QN AUTO: 28.5 PG (ref 27–32)
MCHC RBC AUTO-ENTMCNC: 31.9 G/DL (ref 32–37)
MCV RBC AUTO: 89.2 FL (ref 80–100)
MONOCYTES # BLD: 0.3 K/UL (ref 0–1)
MONOCYTES NFR BLD: 7 %
NEUTROPHILS # BLD AUTO: 3.1 K/UL (ref 1.8–7.7)
NEUTROPHILS NFR BLD: 68 %
NONHDLC SERPL-MCNC: 60 MG/DL
OSMOLALITY UR CALC.SUM OF ELEC: 306 MOSM/KG (ref 275–295)
PHOSPHATE SERPL-MCNC: 4.2 MG/DL (ref 2.4–4.7)
PLATELET # BLD AUTO: 173 K/UL (ref 140–400)
PMV BLD AUTO: 11.4 FL (ref 7.4–10.3)
POTASSIUM SERPL-SCNC: 5.1 MMOL/L (ref 3.3–5.1)
PROT SERPL-MCNC: 5.1 G/DL (ref 5.9–8.4)
RBC # BLD AUTO: 3.16 M/UL (ref 3.7–5.4)
SODIUM SERPL-SCNC: 140 MMOL/L (ref 136–144)
TRIGL SERPL-MCNC: 100 MG/DL (ref 1–149)
WBC # BLD AUTO: 4.5 K/UL (ref 4–11)

## 2018-02-10 PROCEDURE — 85025 COMPLETE CBC W/AUTO DIFF WBC: CPT

## 2018-02-10 PROCEDURE — 87799 DETECT AGENT NOS DNA QUANT: CPT

## 2018-02-10 PROCEDURE — 36415 COLL VENOUS BLD VENIPUNCTURE: CPT

## 2018-02-10 PROCEDURE — 80076 HEPATIC FUNCTION PANEL: CPT

## 2018-02-10 PROCEDURE — 83036 HEMOGLOBIN GLYCOSYLATED A1C: CPT

## 2018-02-10 PROCEDURE — 80158 DRUG ASSAY CYCLOSPORINE: CPT

## 2018-02-10 PROCEDURE — 83970 ASSAY OF PARATHORMONE: CPT

## 2018-02-10 PROCEDURE — 80061 LIPID PANEL: CPT

## 2018-02-10 PROCEDURE — 83735 ASSAY OF MAGNESIUM: CPT

## 2018-02-10 PROCEDURE — 80048 BASIC METABOLIC PNL TOTAL CA: CPT

## 2018-02-10 PROCEDURE — 82306 VITAMIN D 25 HYDROXY: CPT

## 2018-02-10 PROCEDURE — 84100 ASSAY OF PHOSPHORUS: CPT

## 2018-02-12 LAB
25(OH)D3 SERPL-MCNC: 25.6 NG/ML
PTH-INTACT SERPL-MCNC: 388.4 PG/ML (ref 12–88)

## 2018-02-13 ENCOUNTER — OFFICE VISIT (OUTPATIENT)
Dept: NEPHROLOGY | Facility: CLINIC | Age: 58
End: 2018-02-13

## 2018-02-13 VITALS — WEIGHT: 129 LBS | BODY MASS INDEX: 23 KG/M2

## 2018-02-13 DIAGNOSIS — E83.42 HYPOMAGNESEMIA: ICD-10-CM

## 2018-02-13 DIAGNOSIS — Z94.0 KIDNEY TRANSPLANT RECIPIENT: Primary | ICD-10-CM

## 2018-02-13 DIAGNOSIS — N18.4 CKD (CHRONIC KIDNEY DISEASE) STAGE 4, GFR 15-29 ML/MIN (HCC): ICD-10-CM

## 2018-02-13 DIAGNOSIS — R19.7 DIARRHEA, UNSPECIFIED TYPE: ICD-10-CM

## 2018-02-13 DIAGNOSIS — I10 ESSENTIAL HYPERTENSION: ICD-10-CM

## 2018-02-13 LAB — CYCLOSPORINE A: 29.5 NG/ML

## 2018-02-13 PROCEDURE — 99215 OFFICE O/P EST HI 40 MIN: CPT | Performed by: INTERNAL MEDICINE

## 2018-02-13 NOTE — PROGRESS NOTES
Nephrology Progress Note      ASSESSMENT/PLAN:        1) s/p renal transplant 1996- gradually declining renal function with Cr approx 2.7 mg/dl is due to chronic allograft nephropathy + diabetic nephropathy + calcineurin-inhibitor toxicity; previous evalua months ago for recurrent pneumonia. Denies any other significant changes in her overall health. Medications otherwise unchanged although cyclosporine dose is somewhat lower. Antihypertensive regimen is the same.   She generally feels reasonably well but visit):    Current Outpatient Prescriptions:  cycloSPORINE modified (NEORAL) 25 MG Oral Cap Take 25 mg by mouth 2 (two) times daily. Disp:  Rfl:    Pravastatin Sodium (PRAVACHOL) 20 MG Oral Tab Take 60 mg by mouth nightly.  Disp:  Rfl:    Pentamidine Isalexisi (See Comments)  Ibuprofen                   Comment:Pt s/p kidney transplant  Sulfa Antibiotics       Rash    ROS:     Denies fever/chills  Denies wt loss/gain  Denies HA or visual changes  Denies CP or palpitations  Denies SOB/cough/hemoptysis  Denies abd

## 2018-02-14 LAB
BK VIRUS DNA, QUANT COPY/ML: <390 CPY/ML
BK VIRUS DNA, QUANT INTERP: NOT DETECTED
BK VIRUS DNA, QUANTITATION: <2.6 LOG

## 2018-02-20 ENCOUNTER — LAB ENCOUNTER (OUTPATIENT)
Dept: LAB | Age: 58
End: 2018-02-20
Attending: INTERNAL MEDICINE
Payer: COMMERCIAL

## 2018-02-20 ENCOUNTER — PATIENT MESSAGE (OUTPATIENT)
Dept: NEPHROLOGY | Facility: CLINIC | Age: 58
End: 2018-02-20

## 2018-02-20 DIAGNOSIS — N18.4 CHRONIC KIDNEY DISEASE, STAGE IV (SEVERE) (HCC): ICD-10-CM

## 2018-02-20 DIAGNOSIS — E83.42 HYPOMAGNESEMIA: Primary | ICD-10-CM

## 2018-02-20 DIAGNOSIS — Z94.0 S/P KIDNEY TRANSPLANT: ICD-10-CM

## 2018-02-20 LAB
ANION GAP SERPL CALC-SCNC: 8 MMOL/L (ref 0–18)
BASOPHILS # BLD: 0 K/UL (ref 0–0.2)
BASOPHILS NFR BLD: 0 %
BUN SERPL-MCNC: 36 MG/DL (ref 8–20)
BUN/CREAT SERPL: 12.1 (ref 10–20)
CALCIUM SERPL-MCNC: 8.2 MG/DL (ref 8.5–10.5)
CHLORIDE SERPL-SCNC: 110 MMOL/L (ref 95–110)
CO2 SERPL-SCNC: 23 MMOL/L (ref 22–32)
CREAT SERPL-MCNC: 2.98 MG/DL (ref 0.5–1.5)
EOSINOPHIL # BLD: 0.2 K/UL (ref 0–0.7)
EOSINOPHIL NFR BLD: 5 %
ERYTHROCYTE [DISTWIDTH] IN BLOOD BY AUTOMATED COUNT: 15.1 % (ref 11–15)
GLUCOSE SERPL-MCNC: 112 MG/DL (ref 70–99)
HCT VFR BLD AUTO: 27.5 % (ref 35–48)
HGB BLD-MCNC: 8.6 G/DL (ref 12–16)
LYMPHOCYTES # BLD: 0.8 K/UL (ref 1–4)
LYMPHOCYTES NFR BLD: 19 %
MAGNESIUM SERPL-MCNC: 0.9 MG/DL (ref 1.8–2.5)
MCH RBC QN AUTO: 27.9 PG (ref 27–32)
MCHC RBC AUTO-ENTMCNC: 31.2 G/DL (ref 32–37)
MCV RBC AUTO: 89.5 FL (ref 80–100)
MONOCYTES # BLD: 0.3 K/UL (ref 0–1)
MONOCYTES NFR BLD: 8 %
NEUTROPHILS # BLD AUTO: 2.8 K/UL (ref 1.8–7.7)
NEUTROPHILS NFR BLD: 68 %
OSMOLALITY UR CALC.SUM OF ELEC: 301 MOSM/KG (ref 275–295)
PLATELET # BLD AUTO: 234 K/UL (ref 140–400)
PMV BLD AUTO: 10.7 FL (ref 7.4–10.3)
POTASSIUM SERPL-SCNC: 5.1 MMOL/L (ref 3.3–5.1)
RBC # BLD AUTO: 3.07 M/UL (ref 3.7–5.4)
SODIUM SERPL-SCNC: 141 MMOL/L (ref 136–144)
WBC # BLD AUTO: 4.1 K/UL (ref 4–11)

## 2018-02-20 PROCEDURE — 80158 DRUG ASSAY CYCLOSPORINE: CPT

## 2018-02-20 PROCEDURE — 80048 BASIC METABOLIC PNL TOTAL CA: CPT

## 2018-02-20 PROCEDURE — 83735 ASSAY OF MAGNESIUM: CPT

## 2018-02-20 PROCEDURE — 36415 COLL VENOUS BLD VENIPUNCTURE: CPT

## 2018-02-20 PROCEDURE — 85025 COMPLETE CBC W/AUTO DIFF WBC: CPT

## 2018-02-21 LAB — CYCLOSPORINE A: 27.6 NG/ML

## 2018-02-22 NOTE — TELEPHONE ENCOUNTER
From: Kaitlin Iqbal  To: Raymond Berry MD  Sent: 2/20/2018 7:34 AM CST  Subject: Non-Urgent Medical Question    The Procrit vial is 1 ml, 2000 u. I injected half a vial on 2/11/18. Please advise re: next dose. Thanks.

## 2018-02-22 NOTE — TELEPHONE ENCOUNTER
D/W PT- SOB / [place] likely due to anemia- labs otherwise stable; will start procrit 10K units SQ q2 weeks and follow labs- stephane llamas

## 2018-02-28 ENCOUNTER — HOSPITAL ENCOUNTER (OUTPATIENT)
Dept: GENERAL RADIOLOGY | Age: 58
Discharge: HOME | End: 2018-02-28
Attending: INTERNAL MEDICINE
Payer: COMMERCIAL

## 2018-02-28 DIAGNOSIS — R06.81 BREATHLESSNESS ON EXERTION: ICD-10-CM

## 2018-02-28 PROCEDURE — 71046 X-RAY EXAM CHEST 2 VIEWS: CPT | Performed by: INTERNAL MEDICINE

## 2018-03-02 ENCOUNTER — TELEPHONE (OUTPATIENT)
Dept: NEPHROLOGY | Facility: CLINIC | Age: 58
End: 2018-03-02

## 2018-03-02 RX ORDER — MYCOPHENOLATE MOFETIL 250 MG/1
250 CAPSULE ORAL
Qty: 60 CAPSULE | Refills: 11 | Status: SHIPPED | OUTPATIENT
Start: 2018-03-02 | End: 2019-07-22

## 2018-03-02 NOTE — TELEPHONE ENCOUNTER
D/w pt- having some diarreha with Abx for PNA- will resume lower dose cellcept 250 mg bid (sans magnesium) after completing abx and diarrhea resolved- to call after 1 wk with update- [facility]

## 2018-03-16 ENCOUNTER — LAB ENCOUNTER (OUTPATIENT)
Dept: LAB | Age: 58
End: 2018-03-16
Attending: INTERNAL MEDICINE
Payer: COMMERCIAL

## 2018-03-16 DIAGNOSIS — Z94.0 S/P KIDNEY TRANSPLANT: ICD-10-CM

## 2018-03-16 DIAGNOSIS — E83.42 HYPOMAGNESEMIA: Primary | ICD-10-CM

## 2018-03-16 DIAGNOSIS — N18.4 CHRONIC KIDNEY DISEASE, STAGE IV (SEVERE) (HCC): ICD-10-CM

## 2018-03-16 LAB
ANION GAP SERPL CALC-SCNC: 8 MMOL/L (ref 0–18)
BASOPHILS # BLD: 0 K/UL (ref 0–0.2)
BASOPHILS NFR BLD: 1 %
BUN SERPL-MCNC: 69 MG/DL (ref 8–20)
BUN/CREAT SERPL: 18.1 (ref 10–20)
CALCIUM SERPL-MCNC: 9 MG/DL (ref 8.5–10.5)
CHLORIDE SERPL-SCNC: 101 MMOL/L (ref 95–110)
CO2 SERPL-SCNC: 26 MMOL/L (ref 22–32)
CREAT SERPL-MCNC: 3.81 MG/DL (ref 0.5–1.5)
EOSINOPHIL # BLD: 0.3 K/UL (ref 0–0.7)
EOSINOPHIL NFR BLD: 5 %
ERYTHROCYTE [DISTWIDTH] IN BLOOD BY AUTOMATED COUNT: 14.9 % (ref 11–15)
GLUCOSE SERPL-MCNC: 166 MG/DL (ref 70–99)
HCT VFR BLD AUTO: 27.8 % (ref 35–48)
HGB BLD-MCNC: 8.9 G/DL (ref 12–16)
LYMPHOCYTES # BLD: 1.3 K/UL (ref 1–4)
LYMPHOCYTES NFR BLD: 21 %
MAGNESIUM SERPL-MCNC: 1.7 MG/DL (ref 1.8–2.5)
MCH RBC QN AUTO: 28.7 PG (ref 27–32)
MCHC RBC AUTO-ENTMCNC: 32.2 G/DL (ref 32–37)
MCV RBC AUTO: 89.4 FL (ref 80–100)
MONOCYTES # BLD: 0.5 K/UL (ref 0–1)
MONOCYTES NFR BLD: 7 %
NEUTROPHILS # BLD AUTO: 4.2 K/UL (ref 1.8–7.7)
NEUTROPHILS NFR BLD: 67 %
OSMOLALITY UR CALC.SUM OF ELEC: 304 MOSM/KG (ref 275–295)
PLATELET # BLD AUTO: 208 K/UL (ref 140–400)
PMV BLD AUTO: 10.3 FL (ref 7.4–10.3)
POTASSIUM SERPL-SCNC: 4.8 MMOL/L (ref 3.3–5.1)
RBC # BLD AUTO: 3.11 M/UL (ref 3.7–5.4)
SODIUM SERPL-SCNC: 135 MMOL/L (ref 136–144)
WBC # BLD AUTO: 6.2 K/UL (ref 4–11)

## 2018-03-16 PROCEDURE — 85025 COMPLETE CBC W/AUTO DIFF WBC: CPT

## 2018-03-16 PROCEDURE — 83735 ASSAY OF MAGNESIUM: CPT

## 2018-03-16 PROCEDURE — 36415 COLL VENOUS BLD VENIPUNCTURE: CPT

## 2018-03-16 PROCEDURE — 80158 DRUG ASSAY CYCLOSPORINE: CPT

## 2018-03-16 PROCEDURE — 80048 BASIC METABOLIC PNL TOTAL CA: CPT

## 2018-03-17 LAB — CYCLOSPORINE A: 28.7 NG/ML

## 2018-03-18 ENCOUNTER — TELEPHONE (OUTPATIENT)
Dept: NEPHROLOGY | Facility: CLINIC | Age: 58
End: 2018-03-18

## 2018-03-19 ENCOUNTER — APPOINTMENT (OUTPATIENT)
Dept: LAB | Age: 58
End: 2018-03-19
Attending: NURSE ANESTHETIST, CERTIFIED REGISTERED
Payer: COMMERCIAL

## 2018-03-19 DIAGNOSIS — N18.4 CHRONIC KIDNEY DISEASE, STAGE IV (SEVERE) (HCC): ICD-10-CM

## 2018-03-19 LAB
ANION GAP SERPL CALC-SCNC: 8 MMOL/L (ref 0–18)
BUN SERPL-MCNC: 68 MG/DL (ref 8–20)
BUN/CREAT SERPL: 19.7 (ref 10–20)
CALCIUM SERPL-MCNC: 9 MG/DL (ref 8.5–10.5)
CHLORIDE SERPL-SCNC: 104 MMOL/L (ref 95–110)
CO2 SERPL-SCNC: 24 MMOL/L (ref 22–32)
CREAT SERPL-MCNC: 3.45 MG/DL (ref 0.5–1.5)
GLUCOSE SERPL-MCNC: 179 MG/DL (ref 70–99)
OSMOLALITY UR CALC.SUM OF ELEC: 306 MOSM/KG (ref 275–295)
POTASSIUM SERPL-SCNC: 4.8 MMOL/L (ref 3.3–5.1)
SODIUM SERPL-SCNC: 136 MMOL/L (ref 136–144)

## 2018-03-19 PROCEDURE — 80048 BASIC METABOLIC PNL TOTAL CA: CPT

## 2018-03-19 PROCEDURE — 36415 COLL VENOUS BLD VENIPUNCTURE: CPT

## 2018-03-20 ENCOUNTER — TELEPHONE (OUTPATIENT)
Dept: NEPHROLOGY | Facility: CLINIC | Age: 58
End: 2018-03-20

## 2018-03-24 ENCOUNTER — LAB ENCOUNTER (OUTPATIENT)
Dept: LAB | Age: 58
End: 2018-03-24
Attending: INTERNAL MEDICINE
Payer: COMMERCIAL

## 2018-03-24 DIAGNOSIS — Z94.0 S/P KIDNEY TRANSPLANT: ICD-10-CM

## 2018-03-24 DIAGNOSIS — E83.42 HYPOMAGNESEMIA: Primary | ICD-10-CM

## 2018-03-24 DIAGNOSIS — N18.4 CHRONIC KIDNEY DISEASE, STAGE IV (SEVERE) (HCC): ICD-10-CM

## 2018-03-24 LAB
ANION GAP SERPL CALC-SCNC: 7 MMOL/L (ref 0–18)
BASOPHILS # BLD: 0 K/UL (ref 0–0.2)
BASOPHILS NFR BLD: 1 %
BUN SERPL-MCNC: 65 MG/DL (ref 8–20)
BUN/CREAT SERPL: 19.4 (ref 10–20)
CALCIUM SERPL-MCNC: 8.9 MG/DL (ref 8.5–10.5)
CHLORIDE SERPL-SCNC: 110 MMOL/L (ref 95–110)
CO2 SERPL-SCNC: 23 MMOL/L (ref 22–32)
CREAT SERPL-MCNC: 3.35 MG/DL (ref 0.5–1.5)
EOSINOPHIL # BLD: 0.4 K/UL (ref 0–0.7)
EOSINOPHIL NFR BLD: 6 %
ERYTHROCYTE [DISTWIDTH] IN BLOOD BY AUTOMATED COUNT: 15.3 % (ref 11–15)
GLUCOSE SERPL-MCNC: 102 MG/DL (ref 70–99)
HCT VFR BLD AUTO: 26.4 % (ref 35–48)
HGB BLD-MCNC: 8.6 G/DL (ref 12–16)
LYMPHOCYTES # BLD: 1.4 K/UL (ref 1–4)
LYMPHOCYTES NFR BLD: 22 %
MAGNESIUM SERPL-MCNC: 1.7 MG/DL (ref 1.8–2.5)
MCH RBC QN AUTO: 29.2 PG (ref 27–32)
MCHC RBC AUTO-ENTMCNC: 32.6 G/DL (ref 32–37)
MCV RBC AUTO: 89.7 FL (ref 80–100)
MONOCYTES # BLD: 0.5 K/UL (ref 0–1)
MONOCYTES NFR BLD: 7 %
NEUTROPHILS # BLD AUTO: 4 K/UL (ref 1.8–7.7)
NEUTROPHILS NFR BLD: 64 %
OSMOLALITY UR CALC.SUM OF ELEC: 309 MOSM/KG (ref 275–295)
PLATELET # BLD AUTO: 229 K/UL (ref 140–400)
PMV BLD AUTO: 10.5 FL (ref 7.4–10.3)
POTASSIUM SERPL-SCNC: 5.1 MMOL/L (ref 3.3–5.1)
RBC # BLD AUTO: 2.94 M/UL (ref 3.7–5.4)
SODIUM SERPL-SCNC: 140 MMOL/L (ref 136–144)
WBC # BLD AUTO: 6.3 K/UL (ref 4–11)

## 2018-03-24 PROCEDURE — 80048 BASIC METABOLIC PNL TOTAL CA: CPT

## 2018-03-24 PROCEDURE — 80158 DRUG ASSAY CYCLOSPORINE: CPT

## 2018-03-24 PROCEDURE — 85025 COMPLETE CBC W/AUTO DIFF WBC: CPT

## 2018-03-24 PROCEDURE — 83735 ASSAY OF MAGNESIUM: CPT

## 2018-03-24 PROCEDURE — 36415 COLL VENOUS BLD VENIPUNCTURE: CPT

## 2018-03-25 LAB — CYCLOSPORINE A: 42.3 NG/ML

## 2018-05-08 ENCOUNTER — OFFICE VISIT (OUTPATIENT)
Dept: NEPHROLOGY | Facility: CLINIC | Age: 58
End: 2018-05-08

## 2018-05-08 DIAGNOSIS — I50.32 CHRONIC DIASTOLIC HEART FAILURE (HCC): ICD-10-CM

## 2018-05-08 DIAGNOSIS — N18.4 CKD (CHRONIC KIDNEY DISEASE) STAGE 4, GFR 15-29 ML/MIN (HCC): Primary | ICD-10-CM

## 2018-05-08 DIAGNOSIS — N18.4 ANEMIA IN STAGE 4 CHRONIC KIDNEY DISEASE (HCC): ICD-10-CM

## 2018-05-08 DIAGNOSIS — D63.1 ANEMIA IN STAGE 4 CHRONIC KIDNEY DISEASE (HCC): ICD-10-CM

## 2018-05-08 PROCEDURE — 99214 OFFICE O/P EST MOD 30 MIN: CPT | Performed by: INTERNAL MEDICINE

## 2018-05-08 RX ORDER — FUROSEMIDE 20 MG/1
20 TABLET ORAL DAILY
Status: ON HOLD | COMMUNITY
End: 2018-05-10

## 2018-05-08 NOTE — PROGRESS NOTES
Nephrology Progress Note      ASSESSMENT/PLAN:        1) s/p renal transplant 1996- gradually declining renal function with Cr approx 3.0 mg/dl is due to chronic allograft nephropathy + diabetic nephropathy + calcineurin-inhibitor toxicity; previous evalua episodes.     HISTORY:  Past Medical History:   Diagnosis Date   • Anemia 2014   • Clotting disorder (HCC)     Clotting Factor 5   • DIABETES    • Gastroparesis    • GERD (gastroesophageal reflux disease)    • High cholesterol    • History of carpal tunnel (10,000 Units total) into the skin once a week. Disp: 4 mL Rfl: 11   Magnesium 100 MG Oral Tab Take 300 mg by mouth 2 (two) times daily. Disp:  Rfl:    cycloSPORINE modified (NEORAL) 25 MG Oral Cap Take 25 mg by mouth 2 (two) times daily.  Disp:  Rfl:    Pr OTHER (SEE COMMENTS)  Ibuprofen                   Comment:Pt s/p kidney transplant  Simvastatin             OTHER (SEE COMMENTS)    Comment:[facility] Allergy Text Annotation: simvastatin  Sulfa Antibiotics       RASH  Diphenoxylate-Atrop*    RASH    ROS:

## 2018-05-09 ENCOUNTER — HOSPITAL ENCOUNTER (OUTPATIENT)
Age: 58
Discharge: ANOTHER HOSPITAL | End: 2018-05-09
Payer: COMMERCIAL

## 2018-05-09 ENCOUNTER — HOSPITAL ENCOUNTER (INPATIENT)
Facility: HOSPITAL | Age: 58
LOS: 1 days | Discharge: HOME | DRG: 391 | End: 2018-05-10
Attending: EMERGENCY MEDICINE | Admitting: HOSPITALIST
Payer: COMMERCIAL

## 2018-05-09 ENCOUNTER — APPOINTMENT (OUTPATIENT)
Dept: CT IMAGING | Facility: HOSPITAL | Age: 58
DRG: 391 | End: 2018-05-09
Attending: EMERGENCY MEDICINE
Payer: COMMERCIAL

## 2018-05-09 VITALS
BODY MASS INDEX: 23.74 KG/M2 | DIASTOLIC BLOOD PRESSURE: 60 MMHG | HEIGHT: 63 IN | OXYGEN SATURATION: 97 % | SYSTOLIC BLOOD PRESSURE: 110 MMHG | WEIGHT: 134 LBS | TEMPERATURE: 98 F | HEART RATE: 56 BPM | RESPIRATION RATE: 16 BRPM

## 2018-05-09 DIAGNOSIS — R11.2 NON-INTRACTABLE VOMITING WITH NAUSEA, UNSPECIFIED VOMITING TYPE: ICD-10-CM

## 2018-05-09 DIAGNOSIS — Z94.0 HISTORY OF KIDNEY TRANSPLANT: ICD-10-CM

## 2018-05-09 DIAGNOSIS — R10.32 ABDOMINAL PAIN, LEFT LOWER QUADRANT: Primary | ICD-10-CM

## 2018-05-09 DIAGNOSIS — E86.0 DEHYDRATION: ICD-10-CM

## 2018-05-09 DIAGNOSIS — N18.5 ACUTE RENAL FAILURE SUPERIMPOSED ON STAGE 5 CHRONIC KIDNEY DISEASE, NOT ON CHRONIC DIALYSIS, UNSPECIFIED ACUTE RENAL FAILURE TYPE (HCC): Primary | ICD-10-CM

## 2018-05-09 DIAGNOSIS — R11.2 NAUSEA AND VOMITING IN ADULT: ICD-10-CM

## 2018-05-09 DIAGNOSIS — N17.9 ACUTE RENAL FAILURE SUPERIMPOSED ON STAGE 5 CHRONIC KIDNEY DISEASE, NOT ON CHRONIC DIALYSIS, UNSPECIFIED ACUTE RENAL FAILURE TYPE (HCC): Primary | ICD-10-CM

## 2018-05-09 DIAGNOSIS — R10.32 ABDOMINAL PAIN, LEFT LOWER QUADRANT: ICD-10-CM

## 2018-05-09 PROCEDURE — 99212 OFFICE O/P EST SF 10 MIN: CPT

## 2018-05-09 PROCEDURE — 99213 OFFICE O/P EST LOW 20 MIN: CPT

## 2018-05-09 PROCEDURE — 74176 CT ABD & PELVIS W/O CONTRAST: CPT | Performed by: EMERGENCY MEDICINE

## 2018-05-09 PROCEDURE — 99223 1ST HOSP IP/OBS HIGH 75: CPT | Performed by: HOSPITALIST

## 2018-05-09 RX ORDER — ONDANSETRON 2 MG/ML
4 INJECTION INTRAMUSCULAR; INTRAVENOUS ONCE
Status: COMPLETED | OUTPATIENT
Start: 2018-05-09 | End: 2018-05-09

## 2018-05-09 RX ORDER — SODIUM CHLORIDE 9 MG/ML
INJECTION, SOLUTION INTRAVENOUS CONTINUOUS
Status: DISPENSED | OUTPATIENT
Start: 2018-05-09 | End: 2018-05-10

## 2018-05-09 RX ORDER — MORPHINE SULFATE 4 MG/ML
2 INJECTION, SOLUTION INTRAMUSCULAR; INTRAVENOUS ONCE
Status: COMPLETED | OUTPATIENT
Start: 2018-05-09 | End: 2018-05-09

## 2018-05-09 RX ORDER — SODIUM CHLORIDE 9 MG/ML
1000 INJECTION, SOLUTION INTRAVENOUS ONCE
Status: COMPLETED | OUTPATIENT
Start: 2018-05-09 | End: 2018-05-09

## 2018-05-09 RX ORDER — LISINOPRIL 10 MG/1
10 TABLET ORAL NIGHTLY
Status: ON HOLD | COMMUNITY
End: 2018-05-10

## 2018-05-09 NOTE — ED PROVIDER NOTES
Patient Seen in: [facility]    History   Patient presents with:  Abdomen/Flank Pain (GI/)    Stated Complaint: Abd Pain/Vomiting    HPI    Patient is a 22-year-old female with a previous kidney transplant, diabetes and vitrectomy L eye  No date: HAND/FINGER SURGERY UNLISTED      Comment: trigger finger relaeases on 3  No date: OTHER SURGICAL HISTORY      Comment: see scanned in document provided by patient  2008: SHOULDER SURG PROC UNLISTED      Comment: benign tumor rem Pulmonary/Chest: Effort normal and breath sounds normal.   Abdominal: Bowel sounds are normal. She exhibits no mass. There is tenderness in the left lower quadrant. There is no rebound, no guarding and no CVA tenderness.    Neurological: She is alert and

## 2018-05-10 ENCOUNTER — TELEPHONE (OUTPATIENT)
Dept: NEPHROLOGY | Facility: CLINIC | Age: 58
End: 2018-05-10

## 2018-05-10 VITALS
SYSTOLIC BLOOD PRESSURE: 120 MMHG | HEIGHT: 63 IN | DIASTOLIC BLOOD PRESSURE: 70 MMHG | HEART RATE: 52 BPM | BODY MASS INDEX: 24.13 KG/M2 | WEIGHT: 136.19 LBS | TEMPERATURE: 99 F | OXYGEN SATURATION: 95 % | RESPIRATION RATE: 18 BRPM

## 2018-05-10 DIAGNOSIS — N17.9 AKI (ACUTE KIDNEY INJURY) (HCC): Primary | ICD-10-CM

## 2018-05-10 PROCEDURE — 99254 IP/OBS CNSLTJ NEW/EST MOD 60: CPT | Performed by: INTERNAL MEDICINE

## 2018-05-10 RX ORDER — ACETAMINOPHEN 325 MG/1
650 TABLET ORAL EVERY 6 HOURS PRN
Status: DISCONTINUED | OUTPATIENT
Start: 2018-05-10 | End: 2018-05-10

## 2018-05-10 RX ORDER — CYCLOSPORINE 25 MG/1
25 CAPSULE, LIQUID FILLED ORAL 2 TIMES DAILY
Status: DISCONTINUED | OUTPATIENT
Start: 2018-05-10 | End: 2018-05-10

## 2018-05-10 RX ORDER — ASPIRIN 81 MG/1
81 TABLET, CHEWABLE ORAL DAILY
Status: DISCONTINUED | OUTPATIENT
Start: 2018-05-10 | End: 2018-05-10

## 2018-05-10 RX ORDER — HYDRALAZINE HYDROCHLORIDE 20 MG/ML
10 INJECTION INTRAMUSCULAR; INTRAVENOUS EVERY 4 HOURS PRN
Status: DISCONTINUED | OUTPATIENT
Start: 2018-05-10 | End: 2018-05-10

## 2018-05-10 RX ORDER — PRASUGREL 10 MG/1
10 TABLET, FILM COATED ORAL DAILY
Status: DISCONTINUED | OUTPATIENT
Start: 2018-05-10 | End: 2018-05-10

## 2018-05-10 RX ORDER — ONDANSETRON 2 MG/ML
4 INJECTION INTRAMUSCULAR; INTRAVENOUS EVERY 6 HOURS PRN
Status: DISCONTINUED | OUTPATIENT
Start: 2018-05-10 | End: 2018-05-10

## 2018-05-10 RX ORDER — MULTIVITAMIN/IRON/FOLIC ACID 18MG-0.4MG
250 TABLET ORAL 2 TIMES DAILY
Status: DISCONTINUED | OUTPATIENT
Start: 2018-05-10 | End: 2018-05-10

## 2018-05-10 RX ORDER — MYCOPHENOLATE MOFETIL 250 MG/1
250 CAPSULE ORAL
Status: DISCONTINUED | OUTPATIENT
Start: 2018-05-10 | End: 2018-05-10

## 2018-05-10 RX ORDER — ACYCLOVIR 400 MG/1
400 TABLET ORAL
Status: DISCONTINUED | OUTPATIENT
Start: 2018-05-10 | End: 2018-05-10

## 2018-05-10 RX ORDER — PRAVASTATIN SODIUM 20 MG
60 TABLET ORAL NIGHTLY
Status: DISCONTINUED | OUTPATIENT
Start: 2018-05-10 | End: 2018-05-10

## 2018-05-10 RX ORDER — DEXTROSE MONOHYDRATE 25 G/50ML
50 INJECTION, SOLUTION INTRAVENOUS AS NEEDED
Status: DISCONTINUED | OUTPATIENT
Start: 2018-05-10 | End: 2018-05-10

## 2018-05-10 RX ORDER — EZETIMIBE 10 MG/1
10 TABLET ORAL NIGHTLY
Status: DISCONTINUED | OUTPATIENT
Start: 2018-05-10 | End: 2018-05-10

## 2018-05-10 RX ORDER — PANTOPRAZOLE SODIUM 40 MG/1
40 TABLET, DELAYED RELEASE ORAL
Status: DISCONTINUED | OUTPATIENT
Start: 2018-05-10 | End: 2018-05-10

## 2018-05-10 RX ORDER — HYDROCODONE BITARTRATE AND ACETAMINOPHEN 5; 325 MG/1; MG/1
1 TABLET ORAL EVERY 6 HOURS PRN
Status: DISCONTINUED | OUTPATIENT
Start: 2018-05-10 | End: 2018-05-10

## 2018-05-10 RX ORDER — SODIUM CHLORIDE 9 MG/ML
INJECTION, SOLUTION INTRAVENOUS CONTINUOUS
Status: DISCONTINUED | OUTPATIENT
Start: 2018-05-10 | End: 2018-05-10

## 2018-05-10 RX ORDER — METRONIDAZOLE 10 MG/G
GEL TOPICAL 2 TIMES DAILY
Status: DISCONTINUED | OUTPATIENT
Start: 2018-05-10 | End: 2018-05-10

## 2018-05-10 RX ORDER — CALCITRIOL 0.25 UG/1
0.25 CAPSULE, LIQUID FILLED ORAL DAILY
Status: DISCONTINUED | OUTPATIENT
Start: 2018-05-10 | End: 2018-05-10

## 2018-05-10 RX ORDER — HEPARIN SODIUM 5000 [USP'U]/ML
5000 INJECTION, SOLUTION INTRAVENOUS; SUBCUTANEOUS EVERY 12 HOURS SCHEDULED
Status: DISCONTINUED | OUTPATIENT
Start: 2018-05-10 | End: 2018-05-10

## 2018-05-10 NOTE — PAYOR COMM NOTE
--------------  ADMISSION REVIEW     Payor: ADRIANNA FERNANDEZ  Subscriber #:  [id number]  Authorization Number: N/A    Admit date: 5/9/18  Admit time: 2336       Patient Seen in: [facility]    History   Patient presents with:  Abdomen/ Comment: skin cancer excision L wrist  1996: TRANSPLANTATION OF KIDNEY  1982: TUBAL LIGATION        Positive for stated complaint: Abdominal pain + nausea/vomiting    ED Triage Vitals  BP: 118/74 [05/09/18 2021]  Pulse: 56 [05/09/18 1927]  Resp: 18 [05/09/ BUN 77 (*)     Creatinine 4.26 (*)     Calculated Osmolality 307 (*)     GFR, Non- 11 (*)     GFR, -American 12 (*)     All other components within normal limits   CBC W/ DIFFERENTIAL - Abnormal; Notable for the following:     RBC Kidney replaced by transplant     1996   • Left knee dislocation 1986   • RENAL DISEASE    • Spondylolysis 2015   • Tendon tear, foot 2015    R foot   • Trigger finger, left 2014    L index finger and ring finger   • Trigger finger, right little finger 201 Negative. Immunologic:  Negative. Musculoskeletal:  Negative. Integumentary:  Negative. Neurologic:  Negative. Psychiatric:  Negative.   ROS reviewed as documented in chart    Physical Exam:  Temp:  [98.2 °F (36.8 °C)-98.4 °F (36.9 °C)] 98.2 °F (36.8 ° IV fluids, nephrology has been consulted. Avoid nephrotoxic medication will repeat BMP in a.m. Lisinopril on hold for now.     Diabetes with associated nephropathy  Sugars suboptimally controlled at this time, patient on insulin pump, will likely require

## 2018-05-10 NOTE — H&P
[address] Audrey Avelar Patient Status:  Emergency    1960 MRN [id number]   Location [address] Attending Jose Islas MD   Hosp Day # 0 SHAYNE Faulkner     Date:   EYE SURGERY      Comment: vitrectomy L eye  No date: HAND/FINGER SURGERY UNLISTED      Comment: trigger finger relaeases on 3  No date: OTHER SURGICAL HISTORY      Comment: see scanned in document provided by patient  2008: [facility] % External Gel   No No   Sig: Apply to aa on the face qd- bid   Patiromer Sorbitex Calcium (VELTASSA) 8.4 g Oral Powd Pack   Yes No   Sig: Take by mouth.    Pentamidine Isethionate 300 MG Inhalation Recon Soln   Yes No   Sig: Inhale 300 mg into the lungs ev Negative. ROS reviewed as documented in chart    Physical Exam:  Temp:  [98.2 °F (36.8 °C)-98.4 °F (36.9 °C)] 98.2 °F (36.8 °C)  Pulse:  [56-61] 61  Resp:  [16-18] 18  BP: (110-118)/(60-74) 118/74    General:  Alert and oriented.   Diffuse skin problem:  N BMP in a.m. Lisinopril on hold for now. Diabetes with associated nephropathy  Sugars suboptimally controlled at this time, patient on insulin pump, will likely require endocrine consult.     Benign hypertension  Blood pressure well controlled continue p

## 2018-05-10 NOTE — ED PROVIDER NOTES
Patient Seen in: [facility] AND [facility] Emergency Department    History   Patient presents with:  Abdomen/Flank Pain (GI/): +nausea/vomiting    Stated Complaint: Abdominal pain + nausea/vomiting    HPI    The patient is a 78-year-old female status post kidn Smokeless tobacco: Never Used                      Alcohol use: No                Review of Systems    Positive for stated complaint: Abdominal pain + nausea/vomiting  Other systems are as noted in HPI.   Constitutional and Abnormal; Notable for the following:        Result Value    Protein Urine >=500 (*)     Glucose Urine 50  (*)     Leukocyte Esterase Urine Small (*)     WBC Urine 7 (*)     Bacteria Urine Few (*)     All other components within normal limits   BASIC METABO lower pelvic renal transplant without hydronephrosis. 7. 2.32 cm cyst upper pole transplant kidney. 8. Appendix not visualized but no sign of inflammation. 9. Small umbilical hernia. 10. Atrophic uterus containing a 3.19 cm partially calcified fibroid.  11. 5/9/2018 Unknown

## 2018-05-10 NOTE — CONSULTS
[facility]    Report of Consultation    Matthew Bloom Patient Status:  Inpatient    1960 MRN [id number]   Location [facility] 5SW/SE Attending Sallee Dance, [facility] Day # 1 PCP Hany Dyer     Date of Admission: SURGERY      Comment: skin cancer excision L wrist  1996: TRANSPLANTATION OF KIDNEY  1982: TUBAL LIGATION  Family History   Problem Relation Age of Onset   • Cancer Sister    • Ear Problems Mother      hearing aid left ear; transmit      reports that she h acetaminophen (TYLENOL) tab 650 mg, 650 mg, Oral, Q6H PRN  •  HYDROcodone-acetaminophen (NORCO) 5-325 MG per tab 1 tablet, 1 tablet, Oral, Q6H PRN  •  Heparin Sodium (Porcine) 5000 UNIT/ML injection 5,000 Units, 5,000 Units, Subcutaneous, 2 times per day Dehydration     Non-intractable vomiting with nausea, unspecified vomiting type     Abdominal pain, left lower quadrant     History of kidney transplant      1.  Diabetes Mellitus Type 1, Controlled  -Continue current pump settings  -Will follow peripherall

## 2018-05-10 NOTE — PLAN OF CARE
Problem: Patient Centered Care  Goal: Patient preferences are identified and integrated in the patient's plan of care  Interventions:  - What would you like us to know as we care for you?   - Provide timely, complete, and accurate information to patient/fa Obtain nutritional consult as needed  - Evaluate fluid balance   Outcome: Progressing  Patient denies nausea or vomiting at this time.      Problem: METABOLIC/FLUID AND ELECTROLYTES - ADULT  Goal: Electrolytes maintained within normal limits  INTERVENTIONS: using appropriate pain scale  - Administer analgesics based on type and severity of pain and evaluate response  - Implement non-pharmacological measures as appropriate and evaluate response  - Consider cultural and social influences on pain and pain manage for interpreters to assist at discharge as needed  - Consider post-discharge preferences of patient/family/discharge partner  - Complete POLST form as appropriate  - Assess patient's ability to be responsible for managing their own health  - Refer to Case

## 2018-05-10 NOTE — ED INITIAL ASSESSMENT (HPI)
Pt went to [facility] with c/o LLQ abdominal pain +nausea/vomiting since last night, sts that she was sent here by [facility] for possible diverticulitis, denies fever/urinary sx

## 2018-05-10 NOTE — DIABETES ED
[facility]  Inpatient Diabetes Clinician Note    Kayley Weller Patient Status:  Inpatient   1960 MRN [id number]  Location [facility] 5SW/SE Attending Teofilo Rodriguez Day # 1 PCP Chrissie Talbert      Patient admitt

## 2018-05-11 NOTE — CONSULTS
[facility]    PATIENT'S NAME: Apolinar Rollins   ATTENDING PHYSICIAN: Kathie Garza DO   CONSULTING PHYSICIAN: Selina Redding.  Sonal Roblero MD   PATIENT ACCOUNT#:   [id number]    LOCATION:  [address] [place] [address] #:   [id number]       DATE OF CLEMENT CellCept 250 p.o. b.i.d. She also takes lisinopril 5 mg in the morning and 10 mg at night and Lasix 20 mg per day at home. SOCIAL HISTORY:  She does not smoke or drink, and she is . She used to work as an  but is retired.     REVIEW O negative. Reassurance, and go home today. Asked her to hold her Lasix unless she develops edema and hold her lisinopril. Gave her my cell phone; to call me on Tuesday, May 15, after she does the BMP and a CBC on Monday, May 14.   Will call me if any prob

## 2018-05-15 ENCOUNTER — LAB ENCOUNTER (OUTPATIENT)
Dept: LAB | Age: 58
End: 2018-05-15
Attending: ORTHOPAEDIC SURGERY
Payer: COMMERCIAL

## 2018-05-15 DIAGNOSIS — N17.9 AKI (ACUTE KIDNEY INJURY) (HCC): ICD-10-CM

## 2018-05-15 PROCEDURE — 85025 COMPLETE CBC W/AUTO DIFF WBC: CPT

## 2018-05-15 PROCEDURE — 36415 COLL VENOUS BLD VENIPUNCTURE: CPT

## 2018-05-15 PROCEDURE — 80048 BASIC METABOLIC PNL TOTAL CA: CPT

## 2018-08-13 ENCOUNTER — APPOINTMENT (OUTPATIENT)
Dept: LAB | Age: 58
End: 2018-08-13
Payer: COMMERCIAL

## 2018-08-13 ENCOUNTER — LAB ENCOUNTER (OUTPATIENT)
Dept: LAB | Age: 58
End: 2018-08-13
Payer: COMMERCIAL

## 2018-08-13 DIAGNOSIS — Z94.0 HISTORY OF TRANSPLANTATION, RENAL: Primary | ICD-10-CM

## 2018-08-13 DIAGNOSIS — Z79.899 HIGH RISK MEDICATION USE: ICD-10-CM

## 2018-08-13 LAB
BASOPHILS # BLD: 0 K/UL (ref 0–0.2)
BASOPHILS NFR BLD: 0 %
CREAT SERPL-MCNC: 1.03 MG/DL (ref 0.5–1.5)
EOSINOPHIL # BLD: 0 K/UL (ref 0–0.7)
EOSINOPHIL NFR BLD: 1 %
ERYTHROCYTE [DISTWIDTH] IN BLOOD BY AUTOMATED COUNT: 17.3 % (ref 11–15)
GLUCOSE SERPL-MCNC: 107 MG/DL (ref 70–99)
HCT VFR BLD AUTO: 33.6 % (ref 35–48)
HGB BLD-MCNC: 10.9 G/DL (ref 12–16)
LYMPHOCYTES # BLD: 0.3 K/UL (ref 1–4)
LYMPHOCYTES NFR BLD: 5 %
MCH RBC QN AUTO: 29.1 PG (ref 27–32)
MCHC RBC AUTO-ENTMCNC: 32.5 G/DL (ref 32–37)
MCV RBC AUTO: 89.4 FL (ref 80–100)
MONOCYTES # BLD: 0.3 K/UL (ref 0–1)
MONOCYTES NFR BLD: 5 %
NEUTROPHILS # BLD AUTO: 5.3 K/UL (ref 1.8–7.7)
NEUTROPHILS NFR BLD: 90 %
PLATELET # BLD AUTO: 242 K/UL (ref 140–400)
PMV BLD AUTO: 8.1 FL (ref 7.4–10.3)
POTASSIUM SERPL-SCNC: 4.8 MMOL/L (ref 3.3–5.1)
RBC # BLD AUTO: 3.76 M/UL (ref 3.7–5.4)
WBC # BLD AUTO: 5.9 K/UL (ref 4–11)

## 2018-08-13 PROCEDURE — 85025 COMPLETE CBC W/AUTO DIFF WBC: CPT

## 2018-08-13 PROCEDURE — 82947 ASSAY GLUCOSE BLOOD QUANT: CPT

## 2018-08-13 PROCEDURE — 84132 ASSAY OF SERUM POTASSIUM: CPT

## 2018-08-13 PROCEDURE — 82565 ASSAY OF CREATININE: CPT

## 2018-08-13 PROCEDURE — 36415 COLL VENOUS BLD VENIPUNCTURE: CPT

## 2018-08-20 ENCOUNTER — LAB ENCOUNTER (OUTPATIENT)
Dept: LAB | Age: 58
End: 2018-08-20
Attending: INTERNAL MEDICINE
Payer: COMMERCIAL

## 2018-08-20 DIAGNOSIS — Z79.899 HIGH RISK MEDICATION USE: ICD-10-CM

## 2018-08-20 DIAGNOSIS — E83.42 HYPOMAGNESEMIA: ICD-10-CM

## 2018-08-20 DIAGNOSIS — Z94.0 HISTORY OF TRANSPLANTATION, RENAL: Primary | ICD-10-CM

## 2018-08-20 LAB
BASOPHILS # BLD: 0 K/UL (ref 0–0.2)
BASOPHILS NFR BLD: 1 %
CREAT SERPL-MCNC: 0.9 MG/DL (ref 0.5–1.5)
EOSINOPHIL # BLD: 0 K/UL (ref 0–0.7)
EOSINOPHIL NFR BLD: 1 %
ERYTHROCYTE [DISTWIDTH] IN BLOOD BY AUTOMATED COUNT: 16.9 % (ref 11–15)
GLUCOSE SERPL-MCNC: 192 MG/DL (ref 70–99)
HCT VFR BLD AUTO: 36.9 % (ref 35–48)
HGB BLD-MCNC: 12.2 G/DL (ref 12–16)
LYMPHOCYTES # BLD: 0.4 K/UL (ref 1–4)
LYMPHOCYTES NFR BLD: 6 %
MAGNESIUM SERPL-MCNC: 1.4 MG/DL (ref 1.8–2.5)
MCH RBC QN AUTO: 28.5 PG (ref 27–32)
MCHC RBC AUTO-ENTMCNC: 33.1 G/DL (ref 32–37)
MCV RBC AUTO: 86.1 FL (ref 80–100)
MONOCYTES # BLD: 0.3 K/UL (ref 0–1)
MONOCYTES NFR BLD: 5 %
NEUTROPHILS # BLD AUTO: 5.9 K/UL (ref 1.8–7.7)
NEUTROPHILS NFR BLD: 88 %
PLATELET # BLD AUTO: 352 K/UL (ref 140–400)
PMV BLD AUTO: 8.3 FL (ref 7.4–10.3)
POTASSIUM SERPL-SCNC: 5 MMOL/L (ref 3.3–5.1)
RBC # BLD AUTO: 4.29 M/UL (ref 3.7–5.4)
WBC # BLD AUTO: 6.7 K/UL (ref 4–11)

## 2018-08-20 PROCEDURE — 36415 COLL VENOUS BLD VENIPUNCTURE: CPT

## 2018-08-20 PROCEDURE — 83735 ASSAY OF MAGNESIUM: CPT

## 2018-08-20 PROCEDURE — 85025 COMPLETE CBC W/AUTO DIFF WBC: CPT

## 2018-08-20 PROCEDURE — 84132 ASSAY OF SERUM POTASSIUM: CPT

## 2018-08-20 PROCEDURE — 82565 ASSAY OF CREATININE: CPT

## 2018-08-20 PROCEDURE — 82947 ASSAY GLUCOSE BLOOD QUANT: CPT

## 2018-08-27 ENCOUNTER — LAB ENCOUNTER (OUTPATIENT)
Dept: LAB | Age: 58
End: 2018-08-27
Attending: INTERNAL MEDICINE
Payer: COMMERCIAL

## 2018-08-27 DIAGNOSIS — Z79.899 HIGH RISK MEDICATION USE: ICD-10-CM

## 2018-08-27 DIAGNOSIS — E83.42 HYPOMAGNESEMIA: ICD-10-CM

## 2018-08-27 DIAGNOSIS — Z94.0 HISTORY OF TRANSPLANTATION, RENAL: ICD-10-CM

## 2018-08-27 LAB
BASOPHILS # BLD: 0 K/UL (ref 0–0.2)
BASOPHILS NFR BLD: 1 %
CREAT SERPL-MCNC: 0.89 MG/DL (ref 0.5–1.5)
EOSINOPHIL # BLD: 0 K/UL (ref 0–0.7)
EOSINOPHIL NFR BLD: 0 %
ERYTHROCYTE [DISTWIDTH] IN BLOOD BY AUTOMATED COUNT: 16.9 % (ref 11–15)
GLUCOSE SERPL-MCNC: 173 MG/DL (ref 70–99)
HCT VFR BLD AUTO: 36.7 % (ref 35–48)
HGB BLD-MCNC: 12.1 G/DL (ref 12–16)
LYMPHOCYTES # BLD: 0.4 K/UL (ref 1–4)
LYMPHOCYTES NFR BLD: 6 %
MAGNESIUM SERPL-MCNC: 1.6 MG/DL (ref 1.8–2.5)
MCH RBC QN AUTO: 28.4 PG (ref 27–32)
MCHC RBC AUTO-ENTMCNC: 33 G/DL (ref 32–37)
MCV RBC AUTO: 86.1 FL (ref 80–100)
MONOCYTES # BLD: 0.3 K/UL (ref 0–1)
MONOCYTES NFR BLD: 4 %
NEUTROPHILS # BLD AUTO: 5.7 K/UL (ref 1.8–7.7)
NEUTROPHILS NFR BLD: 89 %
PLATELET # BLD AUTO: 288 K/UL (ref 140–400)
PMV BLD AUTO: 7.8 FL (ref 7.4–10.3)
POTASSIUM SERPL-SCNC: 5 MMOL/L (ref 3.3–5.1)
RBC # BLD AUTO: 4.27 M/UL (ref 3.7–5.4)
WBC # BLD AUTO: 6.5 K/UL (ref 4–11)

## 2018-08-27 PROCEDURE — 82947 ASSAY GLUCOSE BLOOD QUANT: CPT

## 2018-08-27 PROCEDURE — 84132 ASSAY OF SERUM POTASSIUM: CPT

## 2018-08-27 PROCEDURE — 83735 ASSAY OF MAGNESIUM: CPT

## 2018-08-27 PROCEDURE — 82565 ASSAY OF CREATININE: CPT

## 2018-08-27 PROCEDURE — 85025 COMPLETE CBC W/AUTO DIFF WBC: CPT

## 2018-08-27 PROCEDURE — 36415 COLL VENOUS BLD VENIPUNCTURE: CPT

## 2018-08-28 ENCOUNTER — DOCUMENTATION ONLY (OUTPATIENT)
Dept: TRANSPLANT | Facility: CLINIC | Age: 58
End: 2018-08-28

## 2018-08-28 NOTE — PROGRESS NOTES
Received notification of failed kidney from follow up survey returned by patient.  Fail is indicated by re-transplant  Date of organ failure was reported as 7/25/2018  Cause of failure is reported as Chronic graft loss; reasono unknown, no biopsy.  Biopsy was not done.  TIS verfication is complete

## 2018-09-04 ENCOUNTER — LAB ENCOUNTER (OUTPATIENT)
Dept: LAB | Age: 58
End: 2018-09-04
Attending: INTERNAL MEDICINE
Payer: COMMERCIAL

## 2018-09-04 DIAGNOSIS — Z94.0 HISTORY OF TRANSPLANTATION, RENAL: ICD-10-CM

## 2018-09-04 DIAGNOSIS — E83.42 HYPOMAGNESEMIA: ICD-10-CM

## 2018-09-04 DIAGNOSIS — Z79.899 HIGH RISK MEDICATION USE: ICD-10-CM

## 2018-09-04 LAB
BASOPHILS # BLD: 0.1 K/UL (ref 0–0.2)
BASOPHILS NFR BLD: 1 %
CREAT SERPL-MCNC: 0.84 MG/DL (ref 0.5–1.5)
EOSINOPHIL # BLD: 0 K/UL (ref 0–0.7)
EOSINOPHIL NFR BLD: 1 %
ERYTHROCYTE [DISTWIDTH] IN BLOOD BY AUTOMATED COUNT: 16.6 % (ref 11–15)
GLUCOSE SERPL-MCNC: 144 MG/DL (ref 70–99)
HCT VFR BLD AUTO: 34.2 % (ref 35–48)
HGB BLD-MCNC: 11.2 G/DL (ref 12–16)
LYMPHOCYTES # BLD: 0.3 K/UL (ref 1–4)
LYMPHOCYTES NFR BLD: 5 %
MAGNESIUM SERPL-MCNC: 1.9 MG/DL (ref 1.8–2.5)
MCH RBC QN AUTO: 28.8 PG (ref 27–32)
MCHC RBC AUTO-ENTMCNC: 32.9 G/DL (ref 32–37)
MCV RBC AUTO: 87.7 FL (ref 80–100)
MONOCYTES # BLD: 0.3 K/UL (ref 0–1)
MONOCYTES NFR BLD: 4 %
NEUTROPHILS # BLD AUTO: 5.9 K/UL (ref 1.8–7.7)
NEUTROPHILS NFR BLD: 89 %
PLATELET # BLD AUTO: 292 K/UL (ref 140–400)
PMV BLD AUTO: 7.9 FL (ref 7.4–10.3)
POTASSIUM SERPL-SCNC: 4.8 MMOL/L (ref 3.3–5.1)
RBC # BLD AUTO: 3.9 M/UL (ref 3.7–5.4)
WBC # BLD AUTO: 6.7 K/UL (ref 4–11)

## 2018-09-04 PROCEDURE — 82947 ASSAY GLUCOSE BLOOD QUANT: CPT

## 2018-09-04 PROCEDURE — 85025 COMPLETE CBC W/AUTO DIFF WBC: CPT

## 2018-09-04 PROCEDURE — 36415 COLL VENOUS BLD VENIPUNCTURE: CPT

## 2018-09-04 PROCEDURE — 83735 ASSAY OF MAGNESIUM: CPT

## 2018-09-04 PROCEDURE — 82565 ASSAY OF CREATININE: CPT

## 2018-09-04 PROCEDURE — 84132 ASSAY OF SERUM POTASSIUM: CPT

## 2018-09-10 ENCOUNTER — LAB ENCOUNTER (OUTPATIENT)
Dept: LAB | Age: 58
End: 2018-09-10
Attending: INTERNAL MEDICINE
Payer: COMMERCIAL

## 2018-09-10 DIAGNOSIS — Z94.0 HISTORY OF TRANSPLANTATION, RENAL: ICD-10-CM

## 2018-09-10 DIAGNOSIS — Z79.899 HIGH RISK MEDICATION USE: ICD-10-CM

## 2018-09-10 DIAGNOSIS — E83.42 HYPOMAGNESEMIA: ICD-10-CM

## 2018-09-10 DIAGNOSIS — Z94.0 KIDNEY REPLACED BY TRANSPLANT: Primary | ICD-10-CM

## 2018-09-10 DIAGNOSIS — Z79.899 NEED FOR PROPHYLACTIC CHEMOTHERAPY: ICD-10-CM

## 2018-09-10 LAB
BASOPHILS # BLD: 0.1 K/UL (ref 0–0.2)
BASOPHILS NFR BLD: 1 %
CREAT SERPL-MCNC: 0.81 MG/DL (ref 0.5–1.5)
EOSINOPHIL # BLD: 0 K/UL (ref 0–0.7)
EOSINOPHIL NFR BLD: 1 %
ERYTHROCYTE [DISTWIDTH] IN BLOOD BY AUTOMATED COUNT: 16.6 % (ref 11–15)
GLUCOSE SERPL-MCNC: 175 MG/DL (ref 70–99)
HCT VFR BLD AUTO: 33.8 % (ref 35–48)
HGB BLD-MCNC: 10.6 G/DL (ref 12–16)
LYMPHOCYTES # BLD: 0.3 K/UL (ref 1–4)
LYMPHOCYTES NFR BLD: 7 %
MAGNESIUM SERPL-MCNC: 1.7 MG/DL (ref 1.8–2.5)
MCH RBC QN AUTO: 27.8 PG (ref 27–32)
MCHC RBC AUTO-ENTMCNC: 31.3 G/DL (ref 32–37)
MCV RBC AUTO: 89 FL (ref 80–100)
MONOCYTES # BLD: 0.3 K/UL (ref 0–1)
MONOCYTES NFR BLD: 6 %
NEUTROPHILS # BLD AUTO: 4.2 K/UL (ref 1.8–7.7)
NEUTROPHILS NFR BLD: 85 %
PLATELET # BLD AUTO: 312 K/UL (ref 140–400)
PMV BLD AUTO: 7.7 FL (ref 7.4–10.3)
POTASSIUM SERPL-SCNC: 5 MMOL/L (ref 3.3–5.1)
RBC # BLD AUTO: 3.8 M/UL (ref 3.7–5.4)
WBC # BLD AUTO: 4.9 K/UL (ref 4–11)

## 2018-09-10 PROCEDURE — 82565 ASSAY OF CREATININE: CPT

## 2018-09-10 PROCEDURE — 83735 ASSAY OF MAGNESIUM: CPT

## 2018-09-10 PROCEDURE — 36415 COLL VENOUS BLD VENIPUNCTURE: CPT

## 2018-09-10 PROCEDURE — 82947 ASSAY GLUCOSE BLOOD QUANT: CPT

## 2018-09-10 PROCEDURE — 84132 ASSAY OF SERUM POTASSIUM: CPT

## 2018-09-10 PROCEDURE — 85025 COMPLETE CBC W/AUTO DIFF WBC: CPT

## 2018-09-17 ENCOUNTER — LAB ENCOUNTER (OUTPATIENT)
Dept: LAB | Age: 58
End: 2018-09-17
Payer: COMMERCIAL

## 2018-09-17 DIAGNOSIS — Z94.0 HISTORY OF TRANSPLANTATION, RENAL: ICD-10-CM

## 2018-09-17 DIAGNOSIS — Z79.899 HIGH RISK MEDICATION USE: ICD-10-CM

## 2018-09-17 DIAGNOSIS — E83.42 HYPOMAGNESEMIA: ICD-10-CM

## 2018-09-17 LAB
BASOPHILS # BLD: 0 K/UL (ref 0–0.2)
BASOPHILS NFR BLD: 1 %
CREAT SERPL-MCNC: 0.85 MG/DL (ref 0.5–1.5)
EOSINOPHIL # BLD: 0 K/UL (ref 0–0.7)
EOSINOPHIL NFR BLD: 1 %
ERYTHROCYTE [DISTWIDTH] IN BLOOD BY AUTOMATED COUNT: 16.2 % (ref 11–15)
GLUCOSE SERPL-MCNC: 130 MG/DL (ref 70–99)
HCT VFR BLD AUTO: 35 % (ref 35–48)
HGB BLD-MCNC: 11.1 G/DL (ref 12–16)
LYMPHOCYTES # BLD: 0.4 K/UL (ref 1–4)
LYMPHOCYTES NFR BLD: 8 %
MAGNESIUM SERPL-MCNC: 1.7 MG/DL (ref 1.8–2.5)
MCH RBC QN AUTO: 28.3 PG (ref 27–32)
MCHC RBC AUTO-ENTMCNC: 31.9 G/DL (ref 32–37)
MCV RBC AUTO: 88.7 FL (ref 80–100)
MONOCYTES # BLD: 0.4 K/UL (ref 0–1)
MONOCYTES NFR BLD: 8 %
NEUTROPHILS # BLD AUTO: 4.1 K/UL (ref 1.8–7.7)
NEUTROPHILS NFR BLD: 82 %
PLATELET # BLD AUTO: 360 K/UL (ref 140–400)
PMV BLD AUTO: 8.2 FL (ref 7.4–10.3)
POTASSIUM SERPL-SCNC: 4.8 MMOL/L (ref 3.3–5.1)
RBC # BLD AUTO: 3.94 M/UL (ref 3.7–5.4)
WBC # BLD AUTO: 5 K/UL (ref 4–11)

## 2018-09-17 PROCEDURE — 82947 ASSAY GLUCOSE BLOOD QUANT: CPT

## 2018-09-17 PROCEDURE — 85025 COMPLETE CBC W/AUTO DIFF WBC: CPT

## 2018-09-17 PROCEDURE — 36415 COLL VENOUS BLD VENIPUNCTURE: CPT

## 2018-09-17 PROCEDURE — 83735 ASSAY OF MAGNESIUM: CPT

## 2018-09-17 PROCEDURE — 82565 ASSAY OF CREATININE: CPT

## 2018-09-17 PROCEDURE — 84132 ASSAY OF SERUM POTASSIUM: CPT

## 2018-09-21 ENCOUNTER — OFFICE VISIT (OUTPATIENT)
Dept: NEPHROLOGY | Facility: CLINIC | Age: 58
End: 2018-09-21
Payer: COMMERCIAL

## 2018-09-21 VITALS
BODY MASS INDEX: 23 KG/M2 | RESPIRATION RATE: 16 BRPM | SYSTOLIC BLOOD PRESSURE: 130 MMHG | HEART RATE: 60 BPM | WEIGHT: 130.63 LBS | DIASTOLIC BLOOD PRESSURE: 84 MMHG

## 2018-09-21 DIAGNOSIS — I10 ESSENTIAL HYPERTENSION: ICD-10-CM

## 2018-09-21 DIAGNOSIS — Z94.0 KIDNEY TRANSPLANT RECIPIENT: ICD-10-CM

## 2018-09-21 DIAGNOSIS — R19.7 DIARRHEA, UNSPECIFIED TYPE: Primary | ICD-10-CM

## 2018-09-21 PROCEDURE — 99214 OFFICE O/P EST MOD 30 MIN: CPT | Performed by: INTERNAL MEDICINE

## 2018-09-21 RX ORDER — TACROLIMUS 1 MG/1
0.5 CAPSULE ORAL 2 TIMES DAILY
COMMUNITY

## 2018-09-21 RX ORDER — MELATONIN
1000
COMMUNITY

## 2018-09-21 RX ORDER — LORAZEPAM 0.5 MG/1
0.25 TABLET ORAL AS NEEDED
COMMUNITY

## 2018-09-22 NOTE — PROGRESS NOTES
Nephrology Progress Note      ASSESSMENT/PLAN:        1) s/p renal transplant 1996 + 2nd transplant 7/18 [facility]- cousin)- doing very well with Cr approx 0.8 mg/dl on myfortic 540 mg bid, prograf 1.5 / 1 mg bid, pred 5 mg qd.  BK virus neg but donor CM UNLISTED      Comment:  benign tumor removed L shoulder blade  1990: SHOULDER SURG PROC UNLISTED      Comment:  rotator cuff repair R  2009: SKIN SURGERY      Comment:  skin cancer excision L wrist  1996: TRANSPLANTATION OF KIDNEY  1982: TUBAL LIGATION   F mg by mouth 2 (two) times daily. Disp:  Rfl:    ezetimibe 10 MG Oral Tab Take 10 mg by mouth daily. Disp:  Rfl:    lansoprazole 30 MG Oral Capsule Delayed Release Take 30 mg by mouth 2 (two) times daily.  Disp:  Rfl:    acyclovir 400 MG Oral Tab Take 40 Without clubbing, cyanosis or edema.   Neurologic: Alert and oriented, normal affect, cranial nerves grossly intact, moving all extremities  Skin: Warm and dry, no rashes      Demetrius Noonan MD  9/21/2018  817 PM

## 2018-09-25 ENCOUNTER — LAB ENCOUNTER (OUTPATIENT)
Dept: LAB | Age: 58
End: 2018-09-25
Attending: INTERNAL MEDICINE
Payer: COMMERCIAL

## 2018-09-25 DIAGNOSIS — Z94.0 HISTORY OF TRANSPLANTATION, RENAL: ICD-10-CM

## 2018-09-25 DIAGNOSIS — Z79.899 NEED FOR PROPHYLACTIC CHEMOTHERAPY: ICD-10-CM

## 2018-09-25 DIAGNOSIS — Z79.899 HIGH RISK MEDICATION USE: ICD-10-CM

## 2018-09-25 DIAGNOSIS — Z94.0 KIDNEY REPLACED BY TRANSPLANT: ICD-10-CM

## 2018-09-25 LAB
BASOPHILS # BLD: 0 K/UL (ref 0–0.2)
BASOPHILS NFR BLD: 0 %
CREAT SERPL-MCNC: 1 MG/DL (ref 0.5–1.5)
EOSINOPHIL # BLD: 0.1 K/UL (ref 0–0.7)
EOSINOPHIL NFR BLD: 1 %
ERYTHROCYTE [DISTWIDTH] IN BLOOD BY AUTOMATED COUNT: 15.9 % (ref 11–15)
GLUCOSE SERPL-MCNC: 96 MG/DL (ref 70–99)
HCT VFR BLD AUTO: 34.1 % (ref 35–48)
HGB BLD-MCNC: 11.3 G/DL (ref 12–16)
LYMPHOCYTES # BLD: 0.4 K/UL (ref 1–4)
LYMPHOCYTES NFR BLD: 5 %
MCH RBC QN AUTO: 28.8 PG (ref 27–32)
MCHC RBC AUTO-ENTMCNC: 33 G/DL (ref 32–37)
MCV RBC AUTO: 87.3 FL (ref 80–100)
MONOCYTES # BLD: 0.8 K/UL (ref 0–1)
MONOCYTES NFR BLD: 11 %
NEUTROPHILS # BLD AUTO: 6.2 K/UL (ref 1.8–7.7)
NEUTROPHILS NFR BLD: 82 %
PLATELET # BLD AUTO: 311 K/UL (ref 140–400)
PMV BLD AUTO: 8.9 FL (ref 7.4–10.3)
POTASSIUM SERPL-SCNC: 5 MMOL/L (ref 3.3–5.1)
RBC # BLD AUTO: 3.91 M/UL (ref 3.7–5.4)
WBC # BLD AUTO: 7.5 K/UL (ref 4–11)

## 2018-09-25 PROCEDURE — 80197 ASSAY OF TACROLIMUS: CPT

## 2018-09-25 PROCEDURE — 85025 COMPLETE CBC W/AUTO DIFF WBC: CPT

## 2018-09-25 PROCEDURE — 36415 COLL VENOUS BLD VENIPUNCTURE: CPT

## 2018-09-25 PROCEDURE — 84132 ASSAY OF SERUM POTASSIUM: CPT

## 2018-09-25 PROCEDURE — 82947 ASSAY GLUCOSE BLOOD QUANT: CPT

## 2018-09-25 PROCEDURE — 82565 ASSAY OF CREATININE: CPT

## 2018-10-09 ENCOUNTER — LAB ENCOUNTER (OUTPATIENT)
Dept: LAB | Age: 58
End: 2018-10-09
Attending: INTERNAL MEDICINE
Payer: COMMERCIAL

## 2018-10-09 DIAGNOSIS — Z94.0 HISTORY OF TRANSPLANTATION, RENAL: ICD-10-CM

## 2018-10-09 DIAGNOSIS — Z79.899 HIGH RISK MEDICATION USE: ICD-10-CM

## 2018-10-09 PROCEDURE — 85025 COMPLETE CBC W/AUTO DIFF WBC: CPT

## 2018-10-09 PROCEDURE — 84132 ASSAY OF SERUM POTASSIUM: CPT

## 2018-10-09 PROCEDURE — 82565 ASSAY OF CREATININE: CPT

## 2018-10-09 PROCEDURE — 36415 COLL VENOUS BLD VENIPUNCTURE: CPT

## 2018-10-09 PROCEDURE — 82947 ASSAY GLUCOSE BLOOD QUANT: CPT

## 2018-10-23 ENCOUNTER — LAB ENCOUNTER (OUTPATIENT)
Dept: LAB | Age: 58
End: 2018-10-23
Payer: COMMERCIAL

## 2018-10-23 DIAGNOSIS — Z94.0 HISTORY OF TRANSPLANTATION, RENAL: ICD-10-CM

## 2018-10-23 DIAGNOSIS — E83.42 HYPOMAGNESEMIA: ICD-10-CM

## 2018-10-23 DIAGNOSIS — Z79.899 HIGH RISK MEDICATION USE: ICD-10-CM

## 2018-10-23 PROCEDURE — 83735 ASSAY OF MAGNESIUM: CPT

## 2018-10-23 PROCEDURE — 85025 COMPLETE CBC W/AUTO DIFF WBC: CPT

## 2018-10-23 PROCEDURE — 82947 ASSAY GLUCOSE BLOOD QUANT: CPT

## 2018-10-23 PROCEDURE — 82565 ASSAY OF CREATININE: CPT

## 2018-10-23 PROCEDURE — 84132 ASSAY OF SERUM POTASSIUM: CPT

## 2018-10-23 PROCEDURE — 36415 COLL VENOUS BLD VENIPUNCTURE: CPT

## 2018-11-06 ENCOUNTER — APPOINTMENT (OUTPATIENT)
Dept: LAB | Age: 58
End: 2018-11-06
Payer: COMMERCIAL

## 2018-11-06 PROCEDURE — 36415 COLL VENOUS BLD VENIPUNCTURE: CPT

## 2018-12-05 ENCOUNTER — LAB ENCOUNTER (OUTPATIENT)
Dept: LAB | Age: 58
End: 2018-12-05
Payer: COMMERCIAL

## 2018-12-05 DIAGNOSIS — Z94.0 HISTORY OF TRANSPLANTATION, RENAL: ICD-10-CM

## 2018-12-05 DIAGNOSIS — E83.42 HYPOMAGNESEMIA: ICD-10-CM

## 2018-12-05 DIAGNOSIS — Z79.899 HIGH RISK MEDICATION USE: ICD-10-CM

## 2018-12-05 PROCEDURE — 84132 ASSAY OF SERUM POTASSIUM: CPT

## 2018-12-05 PROCEDURE — 85007 BL SMEAR W/DIFF WBC COUNT: CPT

## 2018-12-05 PROCEDURE — 82565 ASSAY OF CREATININE: CPT

## 2018-12-05 PROCEDURE — 82947 ASSAY GLUCOSE BLOOD QUANT: CPT

## 2018-12-05 PROCEDURE — 85027 COMPLETE CBC AUTOMATED: CPT

## 2018-12-05 PROCEDURE — 83735 ASSAY OF MAGNESIUM: CPT

## 2018-12-05 PROCEDURE — 85025 COMPLETE CBC W/AUTO DIFF WBC: CPT

## 2018-12-05 PROCEDURE — 36415 COLL VENOUS BLD VENIPUNCTURE: CPT

## 2018-12-05 PROCEDURE — 85060 BLOOD SMEAR INTERPRETATION: CPT

## 2018-12-06 ENCOUNTER — LAB ENCOUNTER (OUTPATIENT)
Dept: LAB | Age: 58
End: 2018-12-06
Payer: COMMERCIAL

## 2018-12-06 DIAGNOSIS — Z94.0 HISTORY OF TRANSPLANTATION, RENAL: ICD-10-CM

## 2018-12-06 DIAGNOSIS — Z79.899 HIGH RISK MEDICATION USE: ICD-10-CM

## 2018-12-06 PROCEDURE — 85025 COMPLETE CBC W/AUTO DIFF WBC: CPT

## 2018-12-06 PROCEDURE — 36415 COLL VENOUS BLD VENIPUNCTURE: CPT

## 2018-12-10 ENCOUNTER — LAB ENCOUNTER (OUTPATIENT)
Dept: LAB | Age: 58
End: 2018-12-10
Payer: COMMERCIAL

## 2018-12-10 DIAGNOSIS — Z94.0 HISTORY OF TRANSPLANTATION, RENAL: ICD-10-CM

## 2018-12-10 DIAGNOSIS — Z79.899 HIGH RISK MEDICATION USE: ICD-10-CM

## 2018-12-10 PROCEDURE — 82565 ASSAY OF CREATININE: CPT

## 2018-12-10 PROCEDURE — 85025 COMPLETE CBC W/AUTO DIFF WBC: CPT

## 2018-12-10 PROCEDURE — 36415 COLL VENOUS BLD VENIPUNCTURE: CPT

## 2018-12-13 ENCOUNTER — LAB ENCOUNTER (OUTPATIENT)
Dept: LAB | Age: 58
End: 2018-12-13
Payer: COMMERCIAL

## 2018-12-13 DIAGNOSIS — E83.42 HYPOMAGNESEMIA: ICD-10-CM

## 2018-12-13 DIAGNOSIS — Z79.899 ENCOUNTER FOR LONG-TERM (CURRENT) USE OF OTHER MEDICATIONS: ICD-10-CM

## 2018-12-13 DIAGNOSIS — Z79.899 HIGH RISK MEDICATION USE: ICD-10-CM

## 2018-12-13 DIAGNOSIS — Z94.0 KIDNEY REPLACED BY TRANSPLANT: ICD-10-CM

## 2018-12-13 DIAGNOSIS — Z94.0 HISTORY OF RENAL TRANSPLANT: Primary | ICD-10-CM

## 2018-12-13 DIAGNOSIS — K52.9 INFLAMMATORY BOWEL DISEASE: ICD-10-CM

## 2018-12-13 PROCEDURE — 87496 CYTOMEG DNA AMP PROBE: CPT

## 2018-12-13 PROCEDURE — 36415 COLL VENOUS BLD VENIPUNCTURE: CPT

## 2018-12-13 PROCEDURE — 83735 ASSAY OF MAGNESIUM: CPT

## 2018-12-13 PROCEDURE — 82565 ASSAY OF CREATININE: CPT

## 2018-12-13 PROCEDURE — 85025 COMPLETE CBC W/AUTO DIFF WBC: CPT

## 2018-12-13 PROCEDURE — 82947 ASSAY GLUCOSE BLOOD QUANT: CPT

## 2018-12-13 PROCEDURE — 84132 ASSAY OF SERUM POTASSIUM: CPT

## 2018-12-18 ENCOUNTER — LAB ENCOUNTER (OUTPATIENT)
Dept: LAB | Age: 58
End: 2018-12-18
Payer: COMMERCIAL

## 2018-12-18 DIAGNOSIS — Z94.0 HISTORY OF RENAL TRANSPLANT: ICD-10-CM

## 2018-12-18 DIAGNOSIS — Z79.899 HIGH RISK MEDICATION USE: ICD-10-CM

## 2018-12-18 PROCEDURE — 82565 ASSAY OF CREATININE: CPT

## 2018-12-18 PROCEDURE — 85025 COMPLETE CBC W/AUTO DIFF WBC: CPT

## 2018-12-18 PROCEDURE — 84132 ASSAY OF SERUM POTASSIUM: CPT

## 2018-12-18 PROCEDURE — 36415 COLL VENOUS BLD VENIPUNCTURE: CPT

## 2018-12-18 PROCEDURE — 82947 ASSAY GLUCOSE BLOOD QUANT: CPT

## 2019-01-08 ENCOUNTER — LAB ENCOUNTER (OUTPATIENT)
Dept: LAB | Age: 59
End: 2019-01-08
Payer: COMMERCIAL

## 2019-01-08 DIAGNOSIS — Z94.0 HISTORY OF RENAL TRANSPLANT: ICD-10-CM

## 2019-01-08 DIAGNOSIS — Z79.899 HIGH RISK MEDICATION USE: ICD-10-CM

## 2019-01-08 LAB
BASOPHILS # BLD: 0 K/UL (ref 0–0.2)
BASOPHILS NFR BLD: 0 %
CREAT SERPL-MCNC: 0.98 MG/DL (ref 0.5–1.5)
EOSINOPHIL # BLD: 0.1 K/UL (ref 0–0.7)
EOSINOPHIL NFR BLD: 2 %
ERYTHROCYTE [DISTWIDTH] IN BLOOD BY AUTOMATED COUNT: 15.9 % (ref 11–15)
GLUCOSE SERPL-MCNC: 165 MG/DL (ref 70–99)
HCT VFR BLD AUTO: 35.7 % (ref 35–48)
HGB BLD-MCNC: 11.3 G/DL (ref 12–16)
LYMPHOCYTES # BLD: 0.3 K/UL (ref 1–4)
LYMPHOCYTES NFR BLD: 5 %
MCH RBC QN AUTO: 26.6 PG (ref 27–32)
MCHC RBC AUTO-ENTMCNC: 31.7 G/DL (ref 32–37)
MCV RBC AUTO: 83.8 FL (ref 80–100)
MONOCYTES # BLD: 0.5 K/UL (ref 0–1)
MONOCYTES NFR BLD: 8 %
NEUTROPHILS # BLD AUTO: 4.8 K/UL (ref 1.8–7.7)
NEUTROPHILS NFR BLD: 85 %
PLATELET # BLD AUTO: 266 K/UL (ref 140–400)
PMV BLD AUTO: 9.3 FL (ref 7.4–10.3)
POTASSIUM SERPL-SCNC: 4.7 MMOL/L (ref 3.3–5.1)
RBC # BLD AUTO: 4.26 M/UL (ref 3.7–5.4)
WBC # BLD AUTO: 5.7 K/UL (ref 4–11)

## 2019-01-08 PROCEDURE — 82947 ASSAY GLUCOSE BLOOD QUANT: CPT

## 2019-01-08 PROCEDURE — 82565 ASSAY OF CREATININE: CPT

## 2019-01-08 PROCEDURE — 85025 COMPLETE CBC W/AUTO DIFF WBC: CPT

## 2019-01-08 PROCEDURE — 36415 COLL VENOUS BLD VENIPUNCTURE: CPT

## 2019-01-08 PROCEDURE — 84132 ASSAY OF SERUM POTASSIUM: CPT

## 2019-01-17 ENCOUNTER — APPOINTMENT (OUTPATIENT)
Dept: LAB | Age: 59
End: 2019-01-17
Payer: COMMERCIAL

## 2019-01-17 PROCEDURE — 36415 COLL VENOUS BLD VENIPUNCTURE: CPT

## 2019-02-13 ENCOUNTER — LAB ENCOUNTER (OUTPATIENT)
Dept: LAB | Age: 59
End: 2019-02-13
Payer: COMMERCIAL

## 2019-02-13 DIAGNOSIS — Z94.0 HISTORY OF RENAL TRANSPLANT: ICD-10-CM

## 2019-02-13 DIAGNOSIS — E83.42 HYPOMAGNESEMIA: ICD-10-CM

## 2019-02-13 DIAGNOSIS — Z94.0 HISTORY OF TRANSPLANTATION, RENAL: ICD-10-CM

## 2019-02-13 DIAGNOSIS — K52.9 INFLAMMATORY BOWEL DISEASE: ICD-10-CM

## 2019-02-13 DIAGNOSIS — E10.65 TYPE I DIABETES MELLITUS WITH RENAL MANIFESTATIONS, UNCONTROLLED (HCC): ICD-10-CM

## 2019-02-13 DIAGNOSIS — Z79.899 HIGH RISK MEDICATION USE: ICD-10-CM

## 2019-02-13 DIAGNOSIS — E10.29 TYPE I DIABETES MELLITUS WITH RENAL MANIFESTATIONS, UNCONTROLLED (HCC): ICD-10-CM

## 2019-02-13 DIAGNOSIS — Z94.0 KIDNEY REPLACED BY TRANSPLANT: Primary | ICD-10-CM

## 2019-02-13 DIAGNOSIS — I25.10 CORONARY ATHEROSCLEROSIS OF NATIVE CORONARY ARTERY: ICD-10-CM

## 2019-02-13 DIAGNOSIS — Z79.899 ENCOUNTER FOR LONG-TERM (CURRENT) USE OF OTHER MEDICATIONS: ICD-10-CM

## 2019-02-13 LAB
ALBUMIN SERPL-MCNC: 3.4 G/DL (ref 3.4–5)
ALBUMIN/GLOB SERPL: 1.2 {RATIO} (ref 1–2)
ALP LIVER SERPL-CCNC: 86 U/L (ref 50–130)
ALT SERPL-CCNC: 41 U/L (ref 13–56)
ANION GAP SERPL CALC-SCNC: 12 MMOL/L (ref 0–18)
AST SERPL-CCNC: 25 U/L (ref 15–37)
BASOPHILS # BLD AUTO: 0.01 X10(3) UL (ref 0–0.2)
BASOPHILS NFR BLD AUTO: 0.2 %
BILIRUB SERPL-MCNC: 0.4 MG/DL (ref 0.1–2)
BUN BLD-MCNC: 19 MG/DL (ref 7–18)
BUN/CREAT SERPL: 21.3 (ref 10–20)
CALCIUM BLD-MCNC: 8.5 MG/DL (ref 8.5–10.1)
CHLORIDE SERPL-SCNC: 100 MMOL/L (ref 98–107)
CHOLEST SMN-MCNC: 147 MG/DL (ref ?–200)
CO2 SERPL-SCNC: 28 MMOL/L (ref 21–32)
CREAT BLD-MCNC: 0.89 MG/DL (ref 0.55–1.02)
CRP SERPL-MCNC: <0.29 MG/DL (ref ?–0.3)
DEPRECATED RDW RBC AUTO: 44.7 FL (ref 35.1–46.3)
EOSINOPHIL # BLD AUTO: 0.05 X10(3) UL (ref 0–0.7)
EOSINOPHIL NFR BLD AUTO: 0.8 %
ERYTHROCYTE [DISTWIDTH] IN BLOOD BY AUTOMATED COUNT: 14.5 % (ref 11–15)
EST. AVERAGE GLUCOSE BLD GHB EST-MCNC: 200 MG/DL (ref 68–126)
GLOBULIN PLAS-MCNC: 2.8 G/DL (ref 2.8–4.4)
GLUCOSE BLD-MCNC: 120 MG/DL (ref 70–99)
HAV IGM SER QL: 2.1 MG/DL (ref 1.6–2.6)
HBA1C MFR BLD HPLC: 8.6 % (ref ?–5.7)
HCT VFR BLD AUTO: 33.9 % (ref 35–48)
HDLC SERPL-MCNC: 55 MG/DL (ref 40–59)
HGB BLD-MCNC: 10.4 G/DL (ref 12–16)
IMM GRANULOCYTES # BLD AUTO: 0.02 X10(3) UL (ref 0–1)
IMM GRANULOCYTES NFR BLD: 0.3 %
LDLC SERPL CALC-MCNC: 59 MG/DL (ref ?–100)
LYMPHOCYTES # BLD AUTO: 0.33 X10(3) UL (ref 1–4)
LYMPHOCYTES NFR BLD AUTO: 5.5 %
M PROTEIN MFR SERPL ELPH: 6.2 G/DL (ref 6.4–8.2)
MCH RBC QN AUTO: 26.3 PG (ref 26–34)
MCHC RBC AUTO-ENTMCNC: 30.7 G/DL (ref 31–37)
MCV RBC AUTO: 85.6 FL (ref 80–100)
MONOCYTES # BLD AUTO: 0.74 X10(3) UL (ref 0.1–1)
MONOCYTES NFR BLD AUTO: 12.3 %
NEUTROPHILS # BLD AUTO: 4.88 X10 (3) UL (ref 1.5–7.7)
NEUTROPHILS # BLD AUTO: 4.88 X10(3) UL (ref 1.5–7.7)
NEUTROPHILS NFR BLD AUTO: 80.9 %
NONHDLC SERPL-MCNC: 92 MG/DL (ref ?–130)
OSMOLALITY SERPL CALC.SUM OF ELEC: 293 MOSM/KG (ref 275–295)
PLATELET # BLD AUTO: 327 10(3)UL (ref 150–450)
POTASSIUM SERPL-SCNC: 4 MMOL/L (ref 3.5–5.1)
POTASSIUM SERPL-SCNC: 4 MMOL/L (ref 3.5–5.1)
POTASSIUM SERPL-SCNC: 4.2 MMOL/L (ref 3.5–5.1)
RBC # BLD AUTO: 3.96 X10(6)UL (ref 3.8–5.3)
SODIUM SERPL-SCNC: 140 MMOL/L (ref 136–145)
TRIGL SERPL-MCNC: 164 MG/DL (ref 30–149)
WBC # BLD AUTO: 6 X10(3) UL (ref 4–11)

## 2019-02-13 PROCEDURE — 83735 ASSAY OF MAGNESIUM: CPT

## 2019-02-13 PROCEDURE — 85025 COMPLETE CBC W/AUTO DIFF WBC: CPT

## 2019-02-13 PROCEDURE — 84132 ASSAY OF SERUM POTASSIUM: CPT

## 2019-02-13 PROCEDURE — 36415 COLL VENOUS BLD VENIPUNCTURE: CPT

## 2019-02-13 PROCEDURE — 80053 COMPREHEN METABOLIC PANEL: CPT

## 2019-02-13 PROCEDURE — 86140 C-REACTIVE PROTEIN: CPT

## 2019-02-13 PROCEDURE — 80180 DRUG SCRN QUAN MYCOPHENOLATE: CPT

## 2019-02-13 PROCEDURE — 83036 HEMOGLOBIN GLYCOSYLATED A1C: CPT

## 2019-02-13 PROCEDURE — 87496 CYTOMEG DNA AMP PROBE: CPT

## 2019-02-13 PROCEDURE — 80061 LIPID PANEL: CPT

## 2019-02-16 LAB
CYTOMEGALOVIRUS DETECTION, PCR: NOT DETECTED
MYCOPHENOLIC ACID GLUCURONIDE: 64.1 UG/ML
MYCOPHENOLIC ACID: 3.4 UG/ML

## 2019-03-06 ENCOUNTER — LAB ENCOUNTER (OUTPATIENT)
Dept: LAB | Age: 59
End: 2019-03-06
Payer: COMMERCIAL

## 2019-03-06 DIAGNOSIS — Z79.899 ENCOUNTER FOR LONG-TERM (CURRENT) USE OF OTHER MEDICATIONS: ICD-10-CM

## 2019-03-06 DIAGNOSIS — Z94.0 KIDNEY REPLACED BY TRANSPLANT: Primary | ICD-10-CM

## 2019-03-06 LAB
BASOPHILS # BLD AUTO: 0.01 X10(3) UL (ref 0–0.2)
BASOPHILS NFR BLD AUTO: 0.2 %
CREAT BLD-MCNC: 0.85 MG/DL (ref 0.55–1.02)
DEPRECATED RDW RBC AUTO: 45.9 FL (ref 35.1–46.3)
EOSINOPHIL # BLD AUTO: 0.16 X10(3) UL (ref 0–0.7)
EOSINOPHIL NFR BLD AUTO: 3 %
ERYTHROCYTE [DISTWIDTH] IN BLOOD BY AUTOMATED COUNT: 14.6 % (ref 11–15)
GLUCOSE BLD-MCNC: 66 MG/DL (ref 70–99)
HCT VFR BLD AUTO: 36.5 % (ref 35–48)
HGB BLD-MCNC: 11 G/DL (ref 12–16)
IMM GRANULOCYTES # BLD AUTO: 0.02 X10(3) UL (ref 0–1)
IMM GRANULOCYTES NFR BLD: 0.4 %
LYMPHOCYTES # BLD AUTO: 0.39 X10(3) UL (ref 1–4)
LYMPHOCYTES NFR BLD AUTO: 7.4 %
MCH RBC QN AUTO: 26.1 PG (ref 26–34)
MCHC RBC AUTO-ENTMCNC: 30.1 G/DL (ref 31–37)
MCV RBC AUTO: 86.7 FL (ref 80–100)
MONOCYTES # BLD AUTO: 0.61 X10(3) UL (ref 0.1–1)
MONOCYTES NFR BLD AUTO: 11.6 %
NEUTROPHILS # BLD AUTO: 4.08 X10 (3) UL (ref 1.5–7.7)
NEUTROPHILS # BLD AUTO: 4.08 X10(3) UL (ref 1.5–7.7)
NEUTROPHILS NFR BLD AUTO: 77.4 %
PLATELET # BLD AUTO: 260 10(3)UL (ref 150–450)
POTASSIUM SERPL-SCNC: 3.9 MMOL/L (ref 3.5–5.1)
RBC # BLD AUTO: 4.21 X10(6)UL (ref 3.8–5.3)
WBC # BLD AUTO: 5.3 X10(3) UL (ref 4–11)

## 2019-03-06 PROCEDURE — 85025 COMPLETE CBC W/AUTO DIFF WBC: CPT

## 2019-03-06 PROCEDURE — 82947 ASSAY GLUCOSE BLOOD QUANT: CPT

## 2019-03-06 PROCEDURE — 84132 ASSAY OF SERUM POTASSIUM: CPT

## 2019-03-06 PROCEDURE — 36415 COLL VENOUS BLD VENIPUNCTURE: CPT

## 2019-03-06 PROCEDURE — 82565 ASSAY OF CREATININE: CPT

## 2019-03-20 ENCOUNTER — LAB ENCOUNTER (OUTPATIENT)
Dept: LAB | Age: 59
End: 2019-03-20
Payer: COMMERCIAL

## 2019-03-20 DIAGNOSIS — Z79.899 HIGH RISK MEDICATION USE: ICD-10-CM

## 2019-03-20 DIAGNOSIS — Z94.0 HISTORY OF RENAL TRANSPLANT: ICD-10-CM

## 2019-03-20 LAB
BASOPHILS # BLD AUTO: 0.02 X10(3) UL (ref 0–0.2)
BASOPHILS NFR BLD AUTO: 0.3 %
CREAT BLD-MCNC: 0.85 MG/DL (ref 0.55–1.02)
DEPRECATED RDW RBC AUTO: 45.7 FL (ref 35.1–46.3)
EOSINOPHIL # BLD AUTO: 0.16 X10(3) UL (ref 0–0.7)
EOSINOPHIL NFR BLD AUTO: 2.1 %
ERYTHROCYTE [DISTWIDTH] IN BLOOD BY AUTOMATED COUNT: 14.7 % (ref 11–15)
GLUCOSE BLD-MCNC: 151 MG/DL (ref 70–99)
HCT VFR BLD AUTO: 35.5 % (ref 35–48)
HGB BLD-MCNC: 10.6 G/DL (ref 12–16)
IMM GRANULOCYTES # BLD AUTO: 0.03 X10(3) UL (ref 0–1)
IMM GRANULOCYTES NFR BLD: 0.4 %
LYMPHOCYTES # BLD AUTO: 0.49 X10(3) UL (ref 1–4)
LYMPHOCYTES NFR BLD AUTO: 6.4 %
MCH RBC QN AUTO: 25.9 PG (ref 26–34)
MCHC RBC AUTO-ENTMCNC: 29.9 G/DL (ref 31–37)
MCV RBC AUTO: 86.6 FL (ref 80–100)
MONOCYTES # BLD AUTO: 0.79 X10(3) UL (ref 0.1–1)
MONOCYTES NFR BLD AUTO: 10.4 %
NEUTROPHILS # BLD AUTO: 6.12 X10 (3) UL (ref 1.5–7.7)
NEUTROPHILS # BLD AUTO: 6.12 X10(3) UL (ref 1.5–7.7)
NEUTROPHILS NFR BLD AUTO: 80.4 %
PLATELET # BLD AUTO: 297 10(3)UL (ref 150–450)
POTASSIUM SERPL-SCNC: 4.3 MMOL/L (ref 3.5–5.1)
RBC # BLD AUTO: 4.1 X10(6)UL (ref 3.8–5.3)
WBC # BLD AUTO: 7.6 X10(3) UL (ref 4–11)

## 2019-03-20 PROCEDURE — 82565 ASSAY OF CREATININE: CPT

## 2019-03-20 PROCEDURE — 82947 ASSAY GLUCOSE BLOOD QUANT: CPT

## 2019-03-20 PROCEDURE — 84132 ASSAY OF SERUM POTASSIUM: CPT

## 2019-03-20 PROCEDURE — 36415 COLL VENOUS BLD VENIPUNCTURE: CPT

## 2019-03-20 PROCEDURE — 85025 COMPLETE CBC W/AUTO DIFF WBC: CPT

## 2019-04-11 ENCOUNTER — LAB ENCOUNTER (OUTPATIENT)
Dept: LAB | Age: 59
End: 2019-04-11
Payer: COMMERCIAL

## 2019-04-11 DIAGNOSIS — M62.81 MUSCLE WEAKNESS: ICD-10-CM

## 2019-04-11 DIAGNOSIS — K52.9 INFLAMMATORY BOWEL DISEASE: ICD-10-CM

## 2019-04-11 DIAGNOSIS — Z94.0 HISTORY OF RENAL TRANSPLANT: ICD-10-CM

## 2019-04-11 DIAGNOSIS — R50.9 FEVER: Primary | ICD-10-CM

## 2019-04-11 DIAGNOSIS — R11.0 NAUSEA: ICD-10-CM

## 2019-04-11 DIAGNOSIS — E83.42 HYPOMAGNESEMIA: ICD-10-CM

## 2019-04-11 DIAGNOSIS — Z79.899 ENCOUNTER FOR LONG-TERM (CURRENT) USE OF OTHER MEDICATIONS: ICD-10-CM

## 2019-04-11 DIAGNOSIS — M79.10 MYALGIA: Primary | ICD-10-CM

## 2019-04-11 DIAGNOSIS — Z79.899 HIGH RISK MEDICATION USE: ICD-10-CM

## 2019-04-11 DIAGNOSIS — Z94.0 KIDNEY REPLACED BY TRANSPLANT: ICD-10-CM

## 2019-04-11 PROCEDURE — 36415 COLL VENOUS BLD VENIPUNCTURE: CPT

## 2019-04-11 PROCEDURE — 82248 BILIRUBIN DIRECT: CPT

## 2019-04-11 PROCEDURE — 84450 TRANSFERASE (AST) (SGOT): CPT

## 2019-04-11 PROCEDURE — 80076 HEPATIC FUNCTION PANEL: CPT

## 2019-04-11 PROCEDURE — 82247 BILIRUBIN TOTAL: CPT

## 2019-04-11 PROCEDURE — 82550 ASSAY OF CK (CPK): CPT

## 2019-04-11 PROCEDURE — 84155 ASSAY OF PROTEIN SERUM: CPT

## 2019-04-11 PROCEDURE — 84075 ASSAY ALKALINE PHOSPHATASE: CPT

## 2019-04-11 PROCEDURE — 84132 ASSAY OF SERUM POTASSIUM: CPT

## 2019-04-11 PROCEDURE — 82947 ASSAY GLUCOSE BLOOD QUANT: CPT

## 2019-04-11 PROCEDURE — 83735 ASSAY OF MAGNESIUM: CPT

## 2019-04-11 PROCEDURE — 81003 URINALYSIS AUTO W/O SCOPE: CPT

## 2019-04-11 PROCEDURE — 85652 RBC SED RATE AUTOMATED: CPT

## 2019-04-11 PROCEDURE — 87086 URINE CULTURE/COLONY COUNT: CPT

## 2019-04-11 PROCEDURE — 82565 ASSAY OF CREATININE: CPT

## 2019-04-11 PROCEDURE — 86140 C-REACTIVE PROTEIN: CPT

## 2019-04-11 PROCEDURE — 87496 CYTOMEG DNA AMP PROBE: CPT

## 2019-04-11 PROCEDURE — 80069 RENAL FUNCTION PANEL: CPT

## 2019-04-11 PROCEDURE — 84460 ALANINE AMINO (ALT) (SGPT): CPT

## 2019-04-11 PROCEDURE — 85025 COMPLETE CBC W/AUTO DIFF WBC: CPT

## 2019-04-15 ENCOUNTER — LAB ENCOUNTER (OUTPATIENT)
Dept: LAB | Age: 59
End: 2019-04-15
Attending: INTERNAL MEDICINE
Payer: COMMERCIAL

## 2019-04-15 DIAGNOSIS — Z94.0 RENAL TRANSPLANT, STATUS POST: ICD-10-CM

## 2019-04-15 DIAGNOSIS — B25.9 CMV INFECTION (HCC): Primary | ICD-10-CM

## 2019-04-15 PROCEDURE — 36415 COLL VENOUS BLD VENIPUNCTURE: CPT

## 2019-04-22 ENCOUNTER — LAB ENCOUNTER (OUTPATIENT)
Dept: LAB | Age: 59
End: 2019-04-22
Payer: COMMERCIAL

## 2019-04-22 DIAGNOSIS — Z94.0 HISTORY OF RENAL TRANSPLANT: ICD-10-CM

## 2019-04-22 DIAGNOSIS — E83.42 HYPOMAGNESEMIA: ICD-10-CM

## 2019-04-22 DIAGNOSIS — Z79.899 ENCOUNTER FOR LONG-TERM (CURRENT) USE OF OTHER MEDICATIONS: ICD-10-CM

## 2019-04-22 DIAGNOSIS — Z94.0 KIDNEY REPLACED BY TRANSPLANT: ICD-10-CM

## 2019-04-22 DIAGNOSIS — B25.9 CMV INFECTION (HCC): ICD-10-CM

## 2019-04-22 DIAGNOSIS — Z94.0 RENAL TRANSPLANT, STATUS POST: ICD-10-CM

## 2019-04-22 DIAGNOSIS — K52.9 INFLAMMATORY BOWEL DISEASE: ICD-10-CM

## 2019-04-22 DIAGNOSIS — Z79.899 HIGH RISK MEDICATION USE: ICD-10-CM

## 2019-04-22 PROCEDURE — 80197 ASSAY OF TACROLIMUS: CPT

## 2019-04-22 PROCEDURE — 82947 ASSAY GLUCOSE BLOOD QUANT: CPT

## 2019-04-22 PROCEDURE — 82565 ASSAY OF CREATININE: CPT

## 2019-04-22 PROCEDURE — 84132 ASSAY OF SERUM POTASSIUM: CPT

## 2019-04-22 PROCEDURE — 85025 COMPLETE CBC W/AUTO DIFF WBC: CPT

## 2019-04-22 PROCEDURE — 36415 COLL VENOUS BLD VENIPUNCTURE: CPT

## 2019-04-22 PROCEDURE — 83735 ASSAY OF MAGNESIUM: CPT

## 2019-04-30 ENCOUNTER — LAB ENCOUNTER (OUTPATIENT)
Dept: LAB | Age: 59
End: 2019-04-30
Payer: COMMERCIAL

## 2019-04-30 DIAGNOSIS — E83.42 HYPOMAGNESEMIA: ICD-10-CM

## 2019-04-30 DIAGNOSIS — Z79.899 HIGH RISK MEDICATION USE: ICD-10-CM

## 2019-04-30 DIAGNOSIS — Z94.0 HISTORY OF RENAL TRANSPLANT: ICD-10-CM

## 2019-04-30 DIAGNOSIS — Z94.0 KIDNEY REPLACED BY TRANSPLANT: ICD-10-CM

## 2019-04-30 DIAGNOSIS — Z79.899 ENCOUNTER FOR LONG-TERM (CURRENT) USE OF OTHER MEDICATIONS: ICD-10-CM

## 2019-04-30 DIAGNOSIS — Z94.0 RENAL TRANSPLANT, STATUS POST: ICD-10-CM

## 2019-04-30 DIAGNOSIS — K52.9 INFLAMMATORY BOWEL DISEASE: ICD-10-CM

## 2019-04-30 DIAGNOSIS — B25.9 CMV INFECTION (HCC): ICD-10-CM

## 2019-04-30 PROCEDURE — 83735 ASSAY OF MAGNESIUM: CPT

## 2019-04-30 PROCEDURE — 82565 ASSAY OF CREATININE: CPT

## 2019-04-30 PROCEDURE — 85025 COMPLETE CBC W/AUTO DIFF WBC: CPT

## 2019-04-30 PROCEDURE — 84132 ASSAY OF SERUM POTASSIUM: CPT

## 2019-04-30 PROCEDURE — 36415 COLL VENOUS BLD VENIPUNCTURE: CPT

## 2019-04-30 PROCEDURE — 82947 ASSAY GLUCOSE BLOOD QUANT: CPT

## 2019-05-07 ENCOUNTER — LAB ENCOUNTER (OUTPATIENT)
Dept: LAB | Age: 59
End: 2019-05-07
Payer: COMMERCIAL

## 2019-05-07 DIAGNOSIS — Z79.899 HIGH RISK MEDICATION USE: ICD-10-CM

## 2019-05-07 DIAGNOSIS — Z94.0 HISTORY OF RENAL TRANSPLANT: ICD-10-CM

## 2019-05-07 PROCEDURE — 85025 COMPLETE CBC W/AUTO DIFF WBC: CPT

## 2019-05-07 PROCEDURE — 82947 ASSAY GLUCOSE BLOOD QUANT: CPT

## 2019-05-07 PROCEDURE — 36415 COLL VENOUS BLD VENIPUNCTURE: CPT

## 2019-05-07 PROCEDURE — 82565 ASSAY OF CREATININE: CPT

## 2019-05-07 PROCEDURE — 84132 ASSAY OF SERUM POTASSIUM: CPT

## 2019-05-14 ENCOUNTER — LAB ENCOUNTER (OUTPATIENT)
Dept: LAB | Age: 59
End: 2019-05-14
Payer: COMMERCIAL

## 2019-05-14 DIAGNOSIS — K52.9 INFLAMMATORY BOWEL DISEASE: ICD-10-CM

## 2019-05-14 DIAGNOSIS — Z94.0 HISTORY OF RENAL TRANSPLANT: ICD-10-CM

## 2019-05-14 DIAGNOSIS — E83.42 HYPOMAGNESEMIA: ICD-10-CM

## 2019-05-14 DIAGNOSIS — Z79.899 HIGH RISK MEDICATION USE: ICD-10-CM

## 2019-05-14 DIAGNOSIS — Z94.0 KIDNEY REPLACED BY TRANSPLANT: ICD-10-CM

## 2019-05-14 DIAGNOSIS — B25.9 CMV INFECTION (HCC): ICD-10-CM

## 2019-05-14 DIAGNOSIS — Z94.0 RENAL TRANSPLANT, STATUS POST: ICD-10-CM

## 2019-05-14 DIAGNOSIS — Z79.899 ENCOUNTER FOR LONG-TERM (CURRENT) USE OF OTHER MEDICATIONS: ICD-10-CM

## 2019-05-14 PROCEDURE — 36415 COLL VENOUS BLD VENIPUNCTURE: CPT

## 2019-05-14 PROCEDURE — 83735 ASSAY OF MAGNESIUM: CPT

## 2019-05-14 PROCEDURE — 82565 ASSAY OF CREATININE: CPT

## 2019-05-14 PROCEDURE — 87496 CYTOMEG DNA AMP PROBE: CPT

## 2019-05-14 PROCEDURE — 82947 ASSAY GLUCOSE BLOOD QUANT: CPT

## 2019-05-14 PROCEDURE — 85025 COMPLETE CBC W/AUTO DIFF WBC: CPT

## 2019-05-14 PROCEDURE — 84132 ASSAY OF SERUM POTASSIUM: CPT

## 2019-05-23 ENCOUNTER — LAB ENCOUNTER (OUTPATIENT)
Dept: LAB | Age: 59
End: 2019-05-23
Payer: COMMERCIAL

## 2019-05-23 DIAGNOSIS — Z79.899 HIGH RISK MEDICATION USE: ICD-10-CM

## 2019-05-23 DIAGNOSIS — Z94.0 HISTORY OF RENAL TRANSPLANT: ICD-10-CM

## 2019-05-23 PROCEDURE — 82565 ASSAY OF CREATININE: CPT

## 2019-05-23 PROCEDURE — 84132 ASSAY OF SERUM POTASSIUM: CPT

## 2019-05-23 PROCEDURE — 82947 ASSAY GLUCOSE BLOOD QUANT: CPT

## 2019-05-23 PROCEDURE — 36415 COLL VENOUS BLD VENIPUNCTURE: CPT

## 2019-05-23 PROCEDURE — 85025 COMPLETE CBC W/AUTO DIFF WBC: CPT

## 2019-05-31 ENCOUNTER — LAB ENCOUNTER (OUTPATIENT)
Dept: LAB | Age: 59
End: 2019-05-31
Payer: COMMERCIAL

## 2019-05-31 DIAGNOSIS — Z79.899 HIGH RISK MEDICATION USE: ICD-10-CM

## 2019-05-31 DIAGNOSIS — Z94.0 HISTORY OF RENAL TRANSPLANT: ICD-10-CM

## 2019-05-31 PROCEDURE — 85025 COMPLETE CBC W/AUTO DIFF WBC: CPT

## 2019-05-31 PROCEDURE — 82565 ASSAY OF CREATININE: CPT

## 2019-05-31 PROCEDURE — 84132 ASSAY OF SERUM POTASSIUM: CPT

## 2019-05-31 PROCEDURE — 36415 COLL VENOUS BLD VENIPUNCTURE: CPT

## 2019-05-31 PROCEDURE — 82947 ASSAY GLUCOSE BLOOD QUANT: CPT

## 2019-06-06 ENCOUNTER — LAB ENCOUNTER (OUTPATIENT)
Dept: LAB | Age: 59
End: 2019-06-06
Attending: OBSTETRICS & GYNECOLOGY
Payer: COMMERCIAL

## 2019-06-06 ENCOUNTER — HOSPITAL ENCOUNTER (OUTPATIENT)
Dept: GENERAL RADIOLOGY | Age: 59
Discharge: HOME | End: 2019-06-06
Attending: INTERNAL MEDICINE
Payer: COMMERCIAL

## 2019-06-06 DIAGNOSIS — E11.29 TYPE II DIABETES MELLITUS WITH RENAL MANIFESTATIONS (HCC): ICD-10-CM

## 2019-06-06 DIAGNOSIS — D64.9 ANEMIA, UNSPECIFIED: ICD-10-CM

## 2019-06-06 DIAGNOSIS — R06.00 DYSPNEA: ICD-10-CM

## 2019-06-06 DIAGNOSIS — I13.10 HYPERTENSIVE HEART AND RENAL DISEASE: ICD-10-CM

## 2019-06-06 DIAGNOSIS — Z79.899 ENCOUNTER FOR LONG-TERM (CURRENT) USE OF OTHER MEDICATIONS: ICD-10-CM

## 2019-06-06 DIAGNOSIS — Z79.899 HIGH RISK MEDICATION USE: ICD-10-CM

## 2019-06-06 DIAGNOSIS — E83.42 HYPOMAGNESEMIA: ICD-10-CM

## 2019-06-06 DIAGNOSIS — Z94.0 KIDNEY REPLACED BY TRANSPLANT: ICD-10-CM

## 2019-06-06 DIAGNOSIS — Z94.0 RENAL TRANSPLANT, STATUS POST: ICD-10-CM

## 2019-06-06 DIAGNOSIS — I25.10 CAD (CORONARY ARTERY DISEASE): ICD-10-CM

## 2019-06-06 DIAGNOSIS — B25.9 CMV INFECTION (HCC): ICD-10-CM

## 2019-06-06 DIAGNOSIS — K52.9 INFLAMMATORY BOWEL DISEASE: ICD-10-CM

## 2019-06-06 DIAGNOSIS — Z94.0 HISTORY OF RENAL TRANSPLANT: ICD-10-CM

## 2019-06-06 DIAGNOSIS — F03.90 ADVANCED DEMENTIA (HCC): ICD-10-CM

## 2019-06-06 DIAGNOSIS — R63.5 WEIGHT GAIN: Primary | ICD-10-CM

## 2019-06-06 DIAGNOSIS — I25.10 CORONARY ATHEROSCLEROSIS OF NATIVE CORONARY ARTERY: ICD-10-CM

## 2019-06-06 PROCEDURE — 82565 ASSAY OF CREATININE: CPT

## 2019-06-06 PROCEDURE — 86140 C-REACTIVE PROTEIN: CPT

## 2019-06-06 PROCEDURE — 87496 CYTOMEG DNA AMP PROBE: CPT

## 2019-06-06 PROCEDURE — 71046 X-RAY EXAM CHEST 2 VIEWS: CPT | Performed by: INTERNAL MEDICINE

## 2019-06-06 PROCEDURE — 85652 RBC SED RATE AUTOMATED: CPT

## 2019-06-06 PROCEDURE — 84132 ASSAY OF SERUM POTASSIUM: CPT

## 2019-06-06 PROCEDURE — 84443 ASSAY THYROID STIM HORMONE: CPT

## 2019-06-06 PROCEDURE — 85025 COMPLETE CBC W/AUTO DIFF WBC: CPT

## 2019-06-06 PROCEDURE — 80061 LIPID PANEL: CPT

## 2019-06-06 PROCEDURE — 36415 COLL VENOUS BLD VENIPUNCTURE: CPT

## 2019-06-06 PROCEDURE — 80053 COMPREHEN METABOLIC PANEL: CPT

## 2019-06-06 PROCEDURE — 83036 HEMOGLOBIN GLYCOSYLATED A1C: CPT

## 2019-06-06 PROCEDURE — 82947 ASSAY GLUCOSE BLOOD QUANT: CPT

## 2019-06-06 PROCEDURE — 83735 ASSAY OF MAGNESIUM: CPT

## 2019-06-06 PROCEDURE — 82728 ASSAY OF FERRITIN: CPT

## 2019-06-06 PROCEDURE — 83615 LACTATE (LD) (LDH) ENZYME: CPT

## 2019-06-14 ENCOUNTER — LAB ENCOUNTER (OUTPATIENT)
Dept: LAB | Age: 59
End: 2019-06-14
Payer: COMMERCIAL

## 2019-06-14 DIAGNOSIS — B25.9 CMV INFECTION (HCC): ICD-10-CM

## 2019-06-14 DIAGNOSIS — Z94.0 KIDNEY REPLACED BY TRANSPLANT: Primary | ICD-10-CM

## 2019-06-14 DIAGNOSIS — Z94.0 RENAL TRANSPLANT, STATUS POST: ICD-10-CM

## 2019-06-21 ENCOUNTER — LAB ENCOUNTER (OUTPATIENT)
Dept: LAB | Age: 59
End: 2019-06-21
Payer: COMMERCIAL

## 2019-06-21 DIAGNOSIS — Z79.899 HIGH RISK MEDICATION USE: ICD-10-CM

## 2019-06-21 DIAGNOSIS — Z94.0 KIDNEY REPLACED BY TRANSPLANT: Primary | ICD-10-CM

## 2019-06-21 PROCEDURE — 82565 ASSAY OF CREATININE: CPT

## 2019-06-21 PROCEDURE — 85025 COMPLETE CBC W/AUTO DIFF WBC: CPT

## 2019-06-21 PROCEDURE — 82947 ASSAY GLUCOSE BLOOD QUANT: CPT

## 2019-06-21 PROCEDURE — 84132 ASSAY OF SERUM POTASSIUM: CPT

## 2019-06-21 PROCEDURE — 36415 COLL VENOUS BLD VENIPUNCTURE: CPT

## 2019-07-02 ENCOUNTER — LAB ENCOUNTER (OUTPATIENT)
Dept: LAB | Age: 59
End: 2019-07-02
Payer: COMMERCIAL

## 2019-07-02 DIAGNOSIS — Z79.899 HIGH RISK MEDICATION USE: ICD-10-CM

## 2019-07-02 DIAGNOSIS — Z94.0 KIDNEY REPLACED BY TRANSPLANT: ICD-10-CM

## 2019-07-02 LAB
BASOPHILS # BLD AUTO: 0.02 X10(3) UL (ref 0–0.2)
BASOPHILS NFR BLD AUTO: 0.5 %
CREAT BLD-MCNC: 0.9 MG/DL (ref 0.55–1.02)
DEPRECATED RDW RBC AUTO: 53.2 FL (ref 35.1–46.3)
EOSINOPHIL # BLD AUTO: 0.02 X10(3) UL (ref 0–0.7)
EOSINOPHIL NFR BLD AUTO: 0.5 %
ERYTHROCYTE [DISTWIDTH] IN BLOOD BY AUTOMATED COUNT: 16.5 % (ref 11–15)
GLUCOSE BLD-MCNC: 74 MG/DL (ref 70–99)
HCT VFR BLD AUTO: 33.7 % (ref 35–48)
HGB BLD-MCNC: 10.2 G/DL (ref 12–16)
IMM GRANULOCYTES # BLD AUTO: 0.01 X10(3) UL (ref 0–1)
IMM GRANULOCYTES NFR BLD: 0.3 %
LYMPHOCYTES # BLD AUTO: 0.64 X10(3) UL (ref 1–4)
LYMPHOCYTES NFR BLD AUTO: 16.4 %
MCH RBC QN AUTO: 26.7 PG (ref 26–34)
MCHC RBC AUTO-ENTMCNC: 30.3 G/DL (ref 31–37)
MCV RBC AUTO: 88.2 FL (ref 80–100)
MONOCYTES # BLD AUTO: 0.14 X10(3) UL (ref 0.1–1)
MONOCYTES NFR BLD AUTO: 3.6 %
NEUTROPHILS # BLD AUTO: 3.07 X10 (3) UL (ref 1.5–7.7)
NEUTROPHILS # BLD AUTO: 3.07 X10(3) UL (ref 1.5–7.7)
NEUTROPHILS NFR BLD AUTO: 78.7 %
PATIENT FASTING: YES
PLATELET # BLD AUTO: 383 10(3)UL (ref 150–450)
POTASSIUM SERPL-SCNC: 4.9 MMOL/L (ref 3.5–5.1)
RBC # BLD AUTO: 3.82 X10(6)UL (ref 3.8–5.3)
WBC # BLD AUTO: 3.9 X10(3) UL (ref 4–11)

## 2019-07-02 PROCEDURE — 85025 COMPLETE CBC W/AUTO DIFF WBC: CPT

## 2019-07-02 PROCEDURE — 84132 ASSAY OF SERUM POTASSIUM: CPT

## 2019-07-02 PROCEDURE — 82565 ASSAY OF CREATININE: CPT

## 2019-07-02 PROCEDURE — 36415 COLL VENOUS BLD VENIPUNCTURE: CPT

## 2019-07-02 PROCEDURE — 82947 ASSAY GLUCOSE BLOOD QUANT: CPT

## 2019-07-17 ENCOUNTER — TELEPHONE (OUTPATIENT)
Dept: NEPHROLOGY | Facility: CLINIC | Age: 59
End: 2019-07-17

## 2019-07-17 NOTE — TELEPHONE ENCOUNTER
Dr. Jud Sanchez wants pt to have iron infusion. [address] is recommending Reclast infusion. Clinical information will follow. Pt wants to get these treatments at [facility] infusion center.

## 2019-07-19 ENCOUNTER — APPOINTMENT (OUTPATIENT)
Dept: LAB | Age: 59
End: 2019-07-19
Payer: COMMERCIAL

## 2019-07-22 PROCEDURE — 88175 CYTOPATH C/V AUTO FLUID REDO: CPT | Performed by: OBSTETRICS & GYNECOLOGY

## 2019-07-22 PROCEDURE — 87624 HPV HI-RISK TYP POOLED RSLT: CPT | Performed by: OBSTETRICS & GYNECOLOGY

## 2019-07-26 ENCOUNTER — APPOINTMENT (OUTPATIENT)
Dept: LAB | Age: 59
End: 2019-07-26
Attending: INTERNAL MEDICINE
Payer: COMMERCIAL

## 2019-07-27 ENCOUNTER — LAB ENCOUNTER (OUTPATIENT)
Dept: LAB | Age: 59
End: 2019-07-27
Attending: INTERNAL MEDICINE
Payer: COMMERCIAL

## 2019-07-27 DIAGNOSIS — Z94.0 KIDNEY REPLACED BY TRANSPLANT: Primary | ICD-10-CM

## 2019-07-27 LAB
CREAT 24H UR-MCNC: 40 ML/MIN (ref 75–125)
CREAT BLD-MCNC: 0.97 MG/DL (ref 0.55–1.02)
DEPRECATED HBV CORE AB SER IA-ACNC: 3.8 NG/ML (ref 18–340)
IRON SATURATION: 8 % (ref 15–50)
IRON SERPL-MCNC: 36 UG/DL (ref 50–170)
SPECIMEN VOL UR: 1980 ML
TOTAL IRON BINDING CAPACITY: 432 UG/DL (ref 240–450)
TRANSFERRIN SERPL-MCNC: 290 MG/DL (ref 200–360)

## 2019-07-27 PROCEDURE — 36415 COLL VENOUS BLD VENIPUNCTURE: CPT

## 2019-07-27 PROCEDURE — 83540 ASSAY OF IRON: CPT

## 2019-07-27 PROCEDURE — 82575 CREATININE CLEARANCE TEST: CPT

## 2019-07-27 PROCEDURE — 84466 ASSAY OF TRANSFERRIN: CPT

## 2019-07-27 PROCEDURE — 82728 ASSAY OF FERRITIN: CPT

## 2019-07-27 PROCEDURE — 82565 ASSAY OF CREATININE: CPT

## 2019-07-29 ENCOUNTER — TELEPHONE (OUTPATIENT)
Dept: NEPHROLOGY | Facility: CLINIC | Age: 59
End: 2019-07-29

## 2019-07-31 ENCOUNTER — TELEPHONE (OUTPATIENT)
Dept: NEPHROLOGY | Facility: CLINIC | Age: 59
End: 2019-07-31

## 2019-07-31 NOTE — TELEPHONE ENCOUNTER
Pt called re:iron infusion. Her ferritin is very low. What to do? She is very angry that it has taken two weeks and she still can't get treatment.

## 2019-08-01 PROBLEM — D50.9 IRON DEFICIENCY ANEMIA, UNSPECIFIED: Status: ACTIVE | Noted: 2019-08-01

## 2019-08-05 ENCOUNTER — OFFICE VISIT (OUTPATIENT)
Dept: HEMATOLOGY/ONCOLOGY | Facility: HOSPITAL | Age: 59
End: 2019-08-05
Attending: INTERNAL MEDICINE
Payer: COMMERCIAL

## 2019-08-05 DIAGNOSIS — D50.9 IRON DEFICIENCY ANEMIA, UNSPECIFIED: Primary | ICD-10-CM

## 2019-08-05 PROCEDURE — 96374 THER/PROPH/DIAG INJ IV PUSH: CPT

## 2019-08-05 NOTE — PROGRESS NOTES
Pt to infusion area for venofer. She states she previously had iron infusions, but is unsure of the name. She had tolerated them well. Pt given Kristina information sheet on venofer. Reviewed with her that there is a potential for a reaction.  Venofer 200 mg

## 2019-08-06 ENCOUNTER — APPOINTMENT (OUTPATIENT)
Dept: HEMATOLOGY/ONCOLOGY | Facility: HOSPITAL | Age: 59
End: 2019-08-06
Attending: INTERNAL MEDICINE
Payer: COMMERCIAL

## 2019-08-09 ENCOUNTER — LAB ENCOUNTER (OUTPATIENT)
Dept: LAB | Age: 59
End: 2019-08-09
Payer: COMMERCIAL

## 2019-08-09 DIAGNOSIS — Z79.899 ENCOUNTER FOR LONG-TERM (CURRENT) USE OF MEDICATIONS: ICD-10-CM

## 2019-08-09 DIAGNOSIS — Z94.0 RENAL TRANSPLANT RECIPIENT: Primary | ICD-10-CM

## 2019-08-09 DIAGNOSIS — R94.31 ABNORMAL PATIENT-ACTIVATED CARDIAC EVENT MONITOR: ICD-10-CM

## 2019-08-09 DIAGNOSIS — D84.9 IMMUNOSUPPRESSED STATUS (HCC): ICD-10-CM

## 2019-08-09 DIAGNOSIS — B25.9: ICD-10-CM

## 2019-08-09 LAB
BASOPHILS # BLD AUTO: 0.01 X10(3) UL (ref 0–0.2)
BASOPHILS NFR BLD AUTO: 0.3 %
CREAT BLD-MCNC: 1.13 MG/DL (ref 0.55–1.02)
DEPRECATED RDW RBC AUTO: 44.3 FL (ref 35.1–46.3)
EOSINOPHIL # BLD AUTO: 0.04 X10(3) UL (ref 0–0.7)
EOSINOPHIL NFR BLD AUTO: 1 %
ERYTHROCYTE [DISTWIDTH] IN BLOOD BY AUTOMATED COUNT: 14.5 % (ref 11–15)
GLUCOSE BLD-MCNC: 119 MG/DL (ref 70–99)
HCT VFR BLD AUTO: 33.1 % (ref 35–48)
HGB BLD-MCNC: 10.5 G/DL (ref 12–16)
IMM GRANULOCYTES # BLD AUTO: 0.01 X10(3) UL (ref 0–1)
IMM GRANULOCYTES NFR BLD: 0.3 %
LYMPHOCYTES # BLD AUTO: 0.5 X10(3) UL (ref 1–4)
LYMPHOCYTES NFR BLD AUTO: 12.7 %
MCH RBC QN AUTO: 26.9 PG (ref 26–34)
MCHC RBC AUTO-ENTMCNC: 31.7 G/DL (ref 31–37)
MCV RBC AUTO: 84.7 FL (ref 80–100)
MONOCYTES # BLD AUTO: 0.26 X10(3) UL (ref 0.1–1)
MONOCYTES NFR BLD AUTO: 6.6 %
NEUTROPHILS # BLD AUTO: 3.11 X10 (3) UL (ref 1.5–7.7)
NEUTROPHILS # BLD AUTO: 3.11 X10(3) UL (ref 1.5–7.7)
NEUTROPHILS NFR BLD AUTO: 79.1 %
PATIENT FASTING: YES
PLATELET # BLD AUTO: 305 10(3)UL (ref 150–450)
POTASSIUM SERPL-SCNC: 4.5 MMOL/L (ref 3.5–5.1)
RBC # BLD AUTO: 3.91 X10(6)UL (ref 3.8–5.3)
WBC # BLD AUTO: 3.9 X10(3) UL (ref 4–11)

## 2019-08-09 PROCEDURE — 85025 COMPLETE CBC W/AUTO DIFF WBC: CPT

## 2019-08-09 PROCEDURE — 36415 COLL VENOUS BLD VENIPUNCTURE: CPT

## 2019-08-09 PROCEDURE — 82947 ASSAY GLUCOSE BLOOD QUANT: CPT

## 2019-08-09 PROCEDURE — 82565 ASSAY OF CREATININE: CPT

## 2019-08-09 PROCEDURE — 84132 ASSAY OF SERUM POTASSIUM: CPT

## 2019-08-15 ENCOUNTER — TELEPHONE (OUTPATIENT)
Dept: NEPHROLOGY | Facility: CLINIC | Age: 59
End: 2019-08-15

## 2019-08-20 ENCOUNTER — LAB ENCOUNTER (OUTPATIENT)
Dept: LAB | Age: 59
End: 2019-08-20
Attending: INTERNAL MEDICINE
Payer: COMMERCIAL

## 2019-08-20 DIAGNOSIS — B25.9: ICD-10-CM

## 2019-08-20 DIAGNOSIS — D84.9 IMMUNOSUPPRESSED STATUS (HCC): ICD-10-CM

## 2019-08-20 DIAGNOSIS — Z94.0 RENAL TRANSPLANT RECIPIENT: ICD-10-CM

## 2019-08-20 DIAGNOSIS — Z79.899 ENCOUNTER FOR LONG-TERM (CURRENT) USE OF MEDICATIONS: ICD-10-CM

## 2019-08-20 LAB
BASOPHILS # BLD AUTO: 0.01 X10(3) UL (ref 0–0.2)
BASOPHILS NFR BLD AUTO: 0.2 %
CREAT BLD-MCNC: 0.95 MG/DL (ref 0.55–1.02)
DEPRECATED HBV CORE AB SER IA-ACNC: 43.6 NG/ML (ref 18–340)
DEPRECATED RDW RBC AUTO: 53 FL (ref 35.1–46.3)
EOSINOPHIL # BLD AUTO: 0.03 X10(3) UL (ref 0–0.7)
EOSINOPHIL NFR BLD AUTO: 0.5 %
ERYTHROCYTE [DISTWIDTH] IN BLOOD BY AUTOMATED COUNT: 16.3 % (ref 11–15)
GLUCOSE BLD-MCNC: 88 MG/DL (ref 70–99)
HCT VFR BLD AUTO: 35.6 % (ref 35–48)
HGB BLD-MCNC: 10.7 G/DL (ref 12–16)
IMM GRANULOCYTES # BLD AUTO: 0.03 X10(3) UL (ref 0–1)
IMM GRANULOCYTES NFR BLD: 0.5 %
IRON SATURATION: 12 % (ref 15–50)
IRON SERPL-MCNC: 57 UG/DL (ref 50–170)
LYMPHOCYTES # BLD AUTO: 0.79 X10(3) UL (ref 1–4)
LYMPHOCYTES NFR BLD AUTO: 13 %
MCH RBC QN AUTO: 27 PG (ref 26–34)
MCHC RBC AUTO-ENTMCNC: 30.1 G/DL (ref 31–37)
MCV RBC AUTO: 89.7 FL (ref 80–100)
MONOCYTES # BLD AUTO: 0.3 X10(3) UL (ref 0.1–1)
MONOCYTES NFR BLD AUTO: 4.9 %
NEUTROPHILS # BLD AUTO: 4.92 X10 (3) UL (ref 1.5–7.7)
NEUTROPHILS # BLD AUTO: 4.92 X10(3) UL (ref 1.5–7.7)
NEUTROPHILS NFR BLD AUTO: 80.9 %
PATIENT FASTING: YES
PLATELET # BLD AUTO: 330 10(3)UL (ref 150–450)
POTASSIUM SERPL-SCNC: 4.5 MMOL/L (ref 3.5–5.1)
RBC # BLD AUTO: 3.97 X10(6)UL (ref 3.8–5.3)
TOTAL IRON BINDING CAPACITY: 457 UG/DL (ref 240–450)
TRANSFERRIN SERPL-MCNC: 307 MG/DL (ref 200–360)
WBC # BLD AUTO: 6.1 X10(3) UL (ref 4–11)

## 2019-08-20 PROCEDURE — 82565 ASSAY OF CREATININE: CPT

## 2019-08-20 PROCEDURE — 82728 ASSAY OF FERRITIN: CPT

## 2019-08-20 PROCEDURE — 84466 ASSAY OF TRANSFERRIN: CPT

## 2019-08-20 PROCEDURE — 83540 ASSAY OF IRON: CPT

## 2019-08-20 PROCEDURE — 36415 COLL VENOUS BLD VENIPUNCTURE: CPT

## 2019-08-20 PROCEDURE — 82947 ASSAY GLUCOSE BLOOD QUANT: CPT

## 2019-08-20 PROCEDURE — 85025 COMPLETE CBC W/AUTO DIFF WBC: CPT

## 2019-08-20 PROCEDURE — 84132 ASSAY OF SERUM POTASSIUM: CPT

## 2019-08-20 NOTE — TELEPHONE ENCOUNTER
D/w pt- pt is very Fe def and needs IV Fe per [facility]- had a pretty severe GI reaction to Venofer x 1- now [facility] recommending Feraheme- will refer to heme (Dr. Ankit Mckay et al) for eval / Lanetta Asp- thx Silviaster Emperor

## 2019-09-05 ENCOUNTER — APPOINTMENT (OUTPATIENT)
Dept: HEMATOLOGY/ONCOLOGY | Facility: HOSPITAL | Age: 59
End: 2019-09-05
Attending: INTERNAL MEDICINE
Payer: COMMERCIAL

## 2019-09-06 ENCOUNTER — LAB ENCOUNTER (OUTPATIENT)
Dept: LAB | Age: 59
End: 2019-09-06
Attending: INTERNAL MEDICINE
Payer: COMMERCIAL

## 2019-09-06 DIAGNOSIS — B25.9: ICD-10-CM

## 2019-09-06 DIAGNOSIS — D84.9 IMMUNOSUPPRESSED STATUS (HCC): ICD-10-CM

## 2019-09-06 DIAGNOSIS — Z94.0 RENAL TRANSPLANT RECIPIENT: ICD-10-CM

## 2019-09-06 DIAGNOSIS — Z79.899 ENCOUNTER FOR LONG-TERM (CURRENT) USE OF MEDICATIONS: ICD-10-CM

## 2019-09-06 LAB
BASOPHILS # BLD AUTO: 0.01 X10(3) UL (ref 0–0.2)
BASOPHILS NFR BLD AUTO: 0.2 %
CREAT BLD-MCNC: 0.89 MG/DL (ref 0.55–1.02)
DEPRECATED RDW RBC AUTO: 54.3 FL (ref 35.1–46.3)
EOSINOPHIL # BLD AUTO: 0.04 X10(3) UL (ref 0–0.7)
EOSINOPHIL NFR BLD AUTO: 0.8 %
ERYTHROCYTE [DISTWIDTH] IN BLOOD BY AUTOMATED COUNT: 16.3 % (ref 11–15)
GLUCOSE BLD-MCNC: 94 MG/DL (ref 70–99)
HCT VFR BLD AUTO: 36.8 % (ref 35–48)
HGB BLD-MCNC: 11.2 G/DL (ref 12–16)
IMM GRANULOCYTES # BLD AUTO: 0.03 X10(3) UL (ref 0–1)
IMM GRANULOCYTES NFR BLD: 0.6 %
LYMPHOCYTES # BLD AUTO: 0.78 X10(3) UL (ref 1–4)
LYMPHOCYTES NFR BLD AUTO: 14.7 %
MCH RBC QN AUTO: 27.8 PG (ref 26–34)
MCHC RBC AUTO-ENTMCNC: 30.4 G/DL (ref 31–37)
MCV RBC AUTO: 91.3 FL (ref 80–100)
MONOCYTES # BLD AUTO: 0.25 X10(3) UL (ref 0.1–1)
MONOCYTES NFR BLD AUTO: 4.7 %
NEUTROPHILS # BLD AUTO: 4.2 X10 (3) UL (ref 1.5–7.7)
NEUTROPHILS # BLD AUTO: 4.2 X10(3) UL (ref 1.5–7.7)
NEUTROPHILS NFR BLD AUTO: 79 %
PATIENT FASTING: YES
PLATELET # BLD AUTO: 272 10(3)UL (ref 150–450)
POTASSIUM SERPL-SCNC: 4.5 MMOL/L (ref 3.5–5.1)
RBC # BLD AUTO: 4.03 X10(6)UL (ref 3.8–5.3)
WBC # BLD AUTO: 5.3 X10(3) UL (ref 4–11)

## 2019-09-06 PROCEDURE — 82565 ASSAY OF CREATININE: CPT

## 2019-09-06 PROCEDURE — 82947 ASSAY GLUCOSE BLOOD QUANT: CPT

## 2019-09-06 PROCEDURE — 85025 COMPLETE CBC W/AUTO DIFF WBC: CPT

## 2019-09-06 PROCEDURE — 36415 COLL VENOUS BLD VENIPUNCTURE: CPT

## 2019-09-06 PROCEDURE — 84132 ASSAY OF SERUM POTASSIUM: CPT

## 2019-09-25 ENCOUNTER — OFFICE VISIT (OUTPATIENT)
Dept: HEMATOLOGY/ONCOLOGY | Facility: HOSPITAL | Age: 59
End: 2019-09-25
Attending: INTERNAL MEDICINE
Payer: COMMERCIAL

## 2019-09-25 VITALS
BODY MASS INDEX: 26.98 KG/M2 | OXYGEN SATURATION: 98 % | RESPIRATION RATE: 18 BRPM | WEIGHT: 158 LBS | TEMPERATURE: 99 F | HEIGHT: 64.21 IN | HEART RATE: 62 BPM

## 2019-09-25 DIAGNOSIS — D50.0 IRON DEFICIENCY ANEMIA DUE TO CHRONIC BLOOD LOSS: Primary | ICD-10-CM

## 2019-09-25 DIAGNOSIS — Z94.0 HISTORY OF KIDNEY TRANSPLANT: ICD-10-CM

## 2019-09-25 PROCEDURE — 99244 OFF/OP CNSLTJ NEW/EST MOD 40: CPT | Performed by: INTERNAL MEDICINE

## 2019-09-25 NOTE — CONSULTS
[facility] Report of Consultation    Patient Name: Alicia Murray   YOB: 1960   Medical Record Number: [id number]   CSN: [id number]   Consulting Physician: Jena Romero MD  Referring Physician(s): Adriana Chatman  Date of Consultation: 9/ Laterality Date   • BLOOD TRANSFUSIONS     • CARDIAC SURG PROCEDURE UNLIST      CAD/Cardiac Stents (Mid LAD +Distal RCA)   • CARPAL TUNNEL RELEASE Right 2007   • CATARACT EXTRACTION  1993   • CHOLECYSTECTOMY  2011   • EYE SURGERY  1981    vitrectomy L eye file        Gets together: Not on file        Attends Gnosticism service: Not on file        Active member of club or organization: Not on file        Attends meetings of clubs or organizations: Not on file        Relationship status: Not on file      Intim RASH  Diphenoxylate-Atrop*    RASH  Lactose                 DIARRHEA    Current Medications:    Current Outpatient Medications:   •  valGANciclovir HCl (VALCYTE) 450 MG Oral Tab, Take 900 mg by mouth daily.   , Disp: , Rfl:   •  Mycophenolate Sodium 180 MG palpitations. Gastrointestinal: Negative for nausea, vomiting, change in bowel habits, diarrhea, constipation and abdominal pain. Integument/breast: Negative for rash, skin lesions, and pruritus.   Hematologic/lymphatic: Negative for easy bruising, bleedi CREATSERUM 0.89 09/06/2019    GLU 94 09/06/2019    CA 9.2 06/06/2019    ALKPHO 82 06/06/2019    ALT 29 06/06/2019    AST 16 06/06/2019    BILT 0.4 06/06/2019    ALB 3.7 06/06/2019    TP 6.9 06/06/2019        Radiologic imaging reviewed at this visit:    CX

## 2019-09-27 ENCOUNTER — LAB ENCOUNTER (OUTPATIENT)
Dept: LAB | Age: 59
End: 2019-09-27
Attending: INTERNAL MEDICINE
Payer: COMMERCIAL

## 2019-09-27 DIAGNOSIS — Z94.0 RENAL TRANSPLANT RECIPIENT: ICD-10-CM

## 2019-09-27 DIAGNOSIS — Z79.899 ENCOUNTER FOR LONG-TERM (CURRENT) USE OF MEDICATIONS: ICD-10-CM

## 2019-09-27 DIAGNOSIS — B25.9: ICD-10-CM

## 2019-09-27 DIAGNOSIS — D84.9 IMMUNOSUPPRESSED STATUS (HCC): ICD-10-CM

## 2019-09-27 LAB
BASOPHILS # BLD AUTO: 0.01 X10(3) UL (ref 0–0.2)
BASOPHILS NFR BLD AUTO: 0.2 %
CREAT BLD-MCNC: 0.98 MG/DL (ref 0.55–1.02)
DEPRECATED HBV CORE AB SER IA-ACNC: 6.7 NG/ML (ref 18–340)
DEPRECATED RDW RBC AUTO: 54.2 FL (ref 35.1–46.3)
EOSINOPHIL # BLD AUTO: 0.07 X10(3) UL (ref 0–0.7)
EOSINOPHIL NFR BLD AUTO: 1.4 %
ERYTHROCYTE [DISTWIDTH] IN BLOOD BY AUTOMATED COUNT: 15.9 % (ref 11–15)
GLUCOSE BLD-MCNC: 70 MG/DL (ref 70–99)
HCT VFR BLD AUTO: 38.8 % (ref 35–48)
HGB BLD-MCNC: 11.9 G/DL (ref 12–16)
IMM GRANULOCYTES # BLD AUTO: 0.01 X10(3) UL (ref 0–1)
IMM GRANULOCYTES NFR BLD: 0.2 %
LYMPHOCYTES # BLD AUTO: 0.97 X10(3) UL (ref 1–4)
LYMPHOCYTES NFR BLD AUTO: 19 %
MCH RBC QN AUTO: 28.5 PG (ref 26–34)
MCHC RBC AUTO-ENTMCNC: 30.7 G/DL (ref 31–37)
MCV RBC AUTO: 92.8 FL (ref 80–100)
MONOCYTES # BLD AUTO: 0.28 X10(3) UL (ref 0.1–1)
MONOCYTES NFR BLD AUTO: 5.5 %
NEUTROPHILS # BLD AUTO: 3.76 X10 (3) UL (ref 1.5–7.7)
NEUTROPHILS # BLD AUTO: 3.76 X10(3) UL (ref 1.5–7.7)
NEUTROPHILS NFR BLD AUTO: 73.7 %
PATIENT FASTING: YES
PLATELET # BLD AUTO: 285 10(3)UL (ref 150–450)
POTASSIUM SERPL-SCNC: 4.7 MMOL/L (ref 3.5–5.1)
RBC # BLD AUTO: 4.18 X10(6)UL (ref 3.8–5.3)
WBC # BLD AUTO: 5.1 X10(3) UL (ref 4–11)

## 2019-09-27 PROCEDURE — 82947 ASSAY GLUCOSE BLOOD QUANT: CPT

## 2019-09-27 PROCEDURE — 85025 COMPLETE CBC W/AUTO DIFF WBC: CPT

## 2019-09-27 PROCEDURE — 82565 ASSAY OF CREATININE: CPT

## 2019-09-27 PROCEDURE — 84132 ASSAY OF SERUM POTASSIUM: CPT

## 2019-09-27 PROCEDURE — 36415 COLL VENOUS BLD VENIPUNCTURE: CPT

## 2019-09-27 PROCEDURE — 82728 ASSAY OF FERRITIN: CPT

## 2019-09-30 ENCOUNTER — TELEPHONE (OUTPATIENT)
Dept: HEMATOLOGY/ONCOLOGY | Facility: HOSPITAL | Age: 59
End: 2019-09-30

## 2019-09-30 NOTE — TELEPHONE ENCOUNTER
Patient was informed that Dr Natan Doss spoke with Dr Gloria Harada. Dr Natan Doss recommended to make an appointment with Gastroenterologist.  Patient stated that she will not make an appointment because she has another opinion from another doctor.  Dr Natan Doss informed

## 2019-10-03 ENCOUNTER — OFFICE VISIT (OUTPATIENT)
Dept: HEMATOLOGY/ONCOLOGY | Facility: HOSPITAL | Age: 59
End: 2019-10-03
Attending: INTERNAL MEDICINE
Payer: COMMERCIAL

## 2019-10-03 VITALS
OXYGEN SATURATION: 99 % | DIASTOLIC BLOOD PRESSURE: 50 MMHG | TEMPERATURE: 98 F | RESPIRATION RATE: 16 BRPM | SYSTOLIC BLOOD PRESSURE: 100 MMHG | HEART RATE: 58 BPM

## 2019-10-03 DIAGNOSIS — D50.8 OTHER IRON DEFICIENCY ANEMIA: Primary | ICD-10-CM

## 2019-10-03 PROCEDURE — 96374 THER/PROPH/DIAG INJ IV PUSH: CPT

## 2019-10-03 NOTE — PROGRESS NOTES
Pt here for INFED infusion. Pt states she will be getting IV test dose of 25 mg followed by 975 mg next week. OK per Dr. Amy Novak. Per Princess Saavedra in pharmacy- pt needs to be observed for longer than 1 hour test dose to ensure she is not having a delayed reaction.  P

## 2019-10-03 NOTE — PATIENT INSTRUCTIONS
Iron Dextran injection  Brand Name: Gretaanailanie Edith  What is this medicine? IRON DEXTRAN (AHY alfred DEX lisa) is an iron complex. Iron is used to make healthy red blood cells, which carry oxygen and nutrients through the body.  This medicine is used to treat people It is important not to miss your dose. Call your doctor or health care professional if you are unable to keep an appointment. Where should I keep my medicine? This drug is given in a hospital or clinic and will not be stored at home.   What should I tell

## 2019-10-04 NOTE — PROGRESS NOTES
Late entry- pt observed for 1.5 hrs post test dose per pharmacy instructions. Pt departed stable, verbalized understanding of POC for next week.

## 2019-10-07 ENCOUNTER — APPOINTMENT (OUTPATIENT)
Dept: HEMATOLOGY/ONCOLOGY | Facility: HOSPITAL | Age: 59
End: 2019-10-07
Attending: INTERNAL MEDICINE
Payer: COMMERCIAL

## 2019-10-10 ENCOUNTER — APPOINTMENT (OUTPATIENT)
Dept: HEMATOLOGY/ONCOLOGY | Facility: HOSPITAL | Age: 59
End: 2019-10-10
Attending: INTERNAL MEDICINE
Payer: COMMERCIAL

## 2019-10-14 ENCOUNTER — LAB ENCOUNTER (OUTPATIENT)
Dept: LAB | Age: 59
End: 2019-10-14
Attending: INTERNAL MEDICINE
Payer: COMMERCIAL

## 2019-10-14 DIAGNOSIS — I25.10 CORONARY ATHEROSCLEROSIS OF NATIVE CORONARY ARTERY: ICD-10-CM

## 2019-10-14 DIAGNOSIS — Z79.899 ENCOUNTER FOR LONG-TERM (CURRENT) USE OF MEDICATIONS: ICD-10-CM

## 2019-10-14 DIAGNOSIS — R63.5 WEIGHT GAIN: Primary | ICD-10-CM

## 2019-10-14 DIAGNOSIS — B25.9: ICD-10-CM

## 2019-10-14 DIAGNOSIS — E10.29 TYPE I DIABETES MELLITUS WITH RENAL MANIFESTATIONS, UNCONTROLLED (HCC): ICD-10-CM

## 2019-10-14 DIAGNOSIS — D64.9 ANEMIA, UNSPECIFIED: ICD-10-CM

## 2019-10-14 DIAGNOSIS — Z94.0 KIDNEY REPLACED BY TRANSPLANT: ICD-10-CM

## 2019-10-14 DIAGNOSIS — Z94.0 RENAL TRANSPLANT RECIPIENT: ICD-10-CM

## 2019-10-14 DIAGNOSIS — R06.00 DYSPNEA: ICD-10-CM

## 2019-10-14 DIAGNOSIS — D84.9 IMMUNOSUPPRESSED STATUS (HCC): ICD-10-CM

## 2019-10-14 DIAGNOSIS — E10.65 TYPE I DIABETES MELLITUS WITH RENAL MANIFESTATIONS, UNCONTROLLED (HCC): ICD-10-CM

## 2019-10-14 PROCEDURE — 82947 ASSAY GLUCOSE BLOOD QUANT: CPT

## 2019-10-14 PROCEDURE — 84466 ASSAY OF TRANSFERRIN: CPT

## 2019-10-14 PROCEDURE — 83540 ASSAY OF IRON: CPT

## 2019-10-14 PROCEDURE — 82565 ASSAY OF CREATININE: CPT

## 2019-10-14 PROCEDURE — 36415 COLL VENOUS BLD VENIPUNCTURE: CPT

## 2019-10-14 PROCEDURE — 85025 COMPLETE CBC W/AUTO DIFF WBC: CPT

## 2019-10-14 PROCEDURE — 82728 ASSAY OF FERRITIN: CPT

## 2019-10-14 PROCEDURE — 84132 ASSAY OF SERUM POTASSIUM: CPT

## 2019-10-22 ENCOUNTER — OFFICE VISIT (OUTPATIENT)
Dept: HEMATOLOGY/ONCOLOGY | Facility: HOSPITAL | Age: 59
End: 2019-10-22
Attending: INTERNAL MEDICINE
Payer: COMMERCIAL

## 2019-10-22 VITALS — RESPIRATION RATE: 16 BRPM | HEART RATE: 57 BPM | OXYGEN SATURATION: 99 % | TEMPERATURE: 98 F

## 2019-10-22 DIAGNOSIS — D50.8 OTHER IRON DEFICIENCY ANEMIA: Primary | ICD-10-CM

## 2019-10-22 PROCEDURE — 96365 THER/PROPH/DIAG IV INF INIT: CPT

## 2019-10-22 NOTE — PROGRESS NOTES
Tolerated infed infusion without incident. Post vitals - 102/66, 54, 97.9, 99%. Will have blood drawn as ordered. No questions at this time.

## 2019-10-22 NOTE — PROGRESS NOTES
Education Record    Learner:  Patient    Disease / Diagnosis:  Iron def anemia    Barriers / Limitations:  None   Comments:    Method:  Brief focused   Comments:    General Topics:  Medication, Procedure and Plan of care reviewed   Comments:    Outcome:  S

## 2019-10-24 ENCOUNTER — APPOINTMENT (OUTPATIENT)
Dept: LAB | Age: 59
End: 2019-10-24
Attending: INTERNAL MEDICINE
Payer: COMMERCIAL

## 2019-11-01 ENCOUNTER — LAB ENCOUNTER (OUTPATIENT)
Dept: LAB | Age: 59
End: 2019-11-01
Attending: INTERNAL MEDICINE
Payer: COMMERCIAL

## 2019-11-01 DIAGNOSIS — Z94.0 RENAL TRANSPLANT RECIPIENT: ICD-10-CM

## 2019-11-01 DIAGNOSIS — D84.9 IMMUNOSUPPRESSED STATUS (HCC): ICD-10-CM

## 2019-11-01 DIAGNOSIS — Z79.899 ENCOUNTER FOR LONG-TERM (CURRENT) USE OF MEDICATIONS: ICD-10-CM

## 2019-11-01 DIAGNOSIS — B25.9: ICD-10-CM

## 2019-11-01 PROCEDURE — 83540 ASSAY OF IRON: CPT

## 2019-11-01 PROCEDURE — 85025 COMPLETE CBC W/AUTO DIFF WBC: CPT

## 2019-11-01 PROCEDURE — 82728 ASSAY OF FERRITIN: CPT

## 2019-11-01 PROCEDURE — 82947 ASSAY GLUCOSE BLOOD QUANT: CPT

## 2019-11-01 PROCEDURE — 36415 COLL VENOUS BLD VENIPUNCTURE: CPT

## 2019-11-01 PROCEDURE — 84466 ASSAY OF TRANSFERRIN: CPT

## 2019-11-01 PROCEDURE — 84132 ASSAY OF SERUM POTASSIUM: CPT

## 2019-11-01 PROCEDURE — 82565 ASSAY OF CREATININE: CPT

## 2019-11-08 ENCOUNTER — APPOINTMENT (OUTPATIENT)
Dept: LAB | Age: 59
End: 2019-11-08
Attending: INTERNAL MEDICINE
Payer: COMMERCIAL

## 2019-11-15 ENCOUNTER — LAB ENCOUNTER (OUTPATIENT)
Dept: LAB | Age: 59
End: 2019-11-15
Attending: INTERNAL MEDICINE
Payer: COMMERCIAL

## 2019-11-15 DIAGNOSIS — Z94.0 RENAL TRANSPLANT RECIPIENT: ICD-10-CM

## 2019-11-15 DIAGNOSIS — D84.9 IMMUNOSUPPRESSED STATUS (HCC): ICD-10-CM

## 2019-11-15 DIAGNOSIS — Z79.899 ENCOUNTER FOR LONG-TERM (CURRENT) USE OF MEDICATIONS: ICD-10-CM

## 2019-11-15 DIAGNOSIS — B25.9: ICD-10-CM

## 2019-11-15 PROCEDURE — 36415 COLL VENOUS BLD VENIPUNCTURE: CPT

## 2019-11-15 PROCEDURE — 84132 ASSAY OF SERUM POTASSIUM: CPT

## 2019-11-15 PROCEDURE — 85025 COMPLETE CBC W/AUTO DIFF WBC: CPT

## 2019-11-15 PROCEDURE — 82565 ASSAY OF CREATININE: CPT

## 2019-11-15 PROCEDURE — 82947 ASSAY GLUCOSE BLOOD QUANT: CPT

## 2019-11-22 ENCOUNTER — APPOINTMENT (OUTPATIENT)
Dept: LAB | Age: 59
End: 2019-11-22
Attending: INTERNAL MEDICINE
Payer: COMMERCIAL

## 2019-11-29 ENCOUNTER — APPOINTMENT (OUTPATIENT)
Dept: LAB | Age: 59
End: 2019-11-29
Attending: INTERNAL MEDICINE
Payer: COMMERCIAL

## 2019-12-06 ENCOUNTER — LAB ENCOUNTER (OUTPATIENT)
Dept: LAB | Age: 59
End: 2019-12-06
Attending: INTERNAL MEDICINE
Payer: COMMERCIAL

## 2019-12-06 DIAGNOSIS — E10.29 TYPE I DIABETES MELLITUS WITH RENAL MANIFESTATIONS, UNCONTROLLED (HCC): ICD-10-CM

## 2019-12-06 DIAGNOSIS — Z94.0 KIDNEY REPLACED BY TRANSPLANT: ICD-10-CM

## 2019-12-06 DIAGNOSIS — E78.00 PURE HYPERCHOLESTEROLEMIA: ICD-10-CM

## 2019-12-06 DIAGNOSIS — Z79.899 ENCOUNTER FOR LONG-TERM (CURRENT) USE OF OTHER MEDICATIONS: Primary | ICD-10-CM

## 2019-12-06 DIAGNOSIS — D84.9 IMMUNOSUPPRESSED STATUS (HCC): ICD-10-CM

## 2019-12-06 DIAGNOSIS — N25.81 SECONDARY HYPERPARATHYROIDISM OF RENAL ORIGIN (HCC): ICD-10-CM

## 2019-12-06 DIAGNOSIS — Z94.0 RENAL TRANSPLANT RECIPIENT: ICD-10-CM

## 2019-12-06 DIAGNOSIS — E10.65 TYPE I DIABETES MELLITUS WITH RENAL MANIFESTATIONS, UNCONTROLLED (HCC): ICD-10-CM

## 2019-12-06 DIAGNOSIS — M79.10 MYALGIA: ICD-10-CM

## 2019-12-06 DIAGNOSIS — Z79.899 ENCOUNTER FOR LONG-TERM (CURRENT) USE OF MEDICATIONS: ICD-10-CM

## 2019-12-06 DIAGNOSIS — B25.9: ICD-10-CM

## 2019-12-06 PROCEDURE — 80076 HEPATIC FUNCTION PANEL: CPT

## 2019-12-06 PROCEDURE — 84460 ALANINE AMINO (ALT) (SGPT): CPT

## 2019-12-06 PROCEDURE — 80061 LIPID PANEL: CPT

## 2019-12-06 PROCEDURE — 82728 ASSAY OF FERRITIN: CPT

## 2019-12-06 PROCEDURE — 84450 TRANSFERASE (AST) (SGOT): CPT

## 2019-12-06 PROCEDURE — 82247 BILIRUBIN TOTAL: CPT

## 2019-12-06 PROCEDURE — 82248 BILIRUBIN DIRECT: CPT

## 2019-12-06 PROCEDURE — 84075 ASSAY ALKALINE PHOSPHATASE: CPT

## 2019-12-06 PROCEDURE — 36415 COLL VENOUS BLD VENIPUNCTURE: CPT

## 2019-12-06 PROCEDURE — 82550 ASSAY OF CK (CPK): CPT

## 2019-12-06 PROCEDURE — 83540 ASSAY OF IRON: CPT

## 2019-12-06 PROCEDURE — 83036 HEMOGLOBIN GLYCOSYLATED A1C: CPT

## 2019-12-06 PROCEDURE — 80069 RENAL FUNCTION PANEL: CPT

## 2019-12-06 PROCEDURE — 84155 ASSAY OF PROTEIN SERUM: CPT

## 2019-12-06 PROCEDURE — 83735 ASSAY OF MAGNESIUM: CPT

## 2019-12-06 PROCEDURE — 84466 ASSAY OF TRANSFERRIN: CPT

## 2019-12-06 PROCEDURE — 82306 VITAMIN D 25 HYDROXY: CPT

## 2019-12-06 PROCEDURE — 83970 ASSAY OF PARATHORMONE: CPT

## 2019-12-06 PROCEDURE — 85025 COMPLETE CBC W/AUTO DIFF WBC: CPT

## 2019-12-06 PROCEDURE — 86140 C-REACTIVE PROTEIN: CPT

## 2019-12-13 ENCOUNTER — APPOINTMENT (OUTPATIENT)
Dept: LAB | Age: 59
End: 2019-12-13
Attending: INTERNAL MEDICINE
Payer: COMMERCIAL

## 2020-01-03 ENCOUNTER — APPOINTMENT (OUTPATIENT)
Dept: LAB | Age: 60
End: 2020-01-03
Attending: INTERNAL MEDICINE
Payer: COMMERCIAL

## 2020-01-24 ENCOUNTER — LAB ENCOUNTER (OUTPATIENT)
Dept: LAB | Age: 60
End: 2020-01-24
Attending: INTERNAL MEDICINE
Payer: COMMERCIAL

## 2020-01-24 DIAGNOSIS — Z94.0 RENAL TRANSPLANT RECIPIENT: ICD-10-CM

## 2020-01-24 DIAGNOSIS — B25.9: ICD-10-CM

## 2020-01-24 DIAGNOSIS — D84.9 IMMUNOSUPPRESSED STATUS (HCC): ICD-10-CM

## 2020-01-24 DIAGNOSIS — Z79.899 ENCOUNTER FOR LONG-TERM (CURRENT) USE OF MEDICATIONS: ICD-10-CM

## 2020-01-24 LAB
BASOPHILS # BLD AUTO: 0.01 X10(3) UL (ref 0–0.2)
BASOPHILS NFR BLD AUTO: 0.2 %
CREAT BLD-MCNC: 0.98 MG/DL (ref 0.55–1.02)
DEPRECATED HBV CORE AB SER IA-ACNC: 107.4 NG/ML (ref 18–340)
DEPRECATED RDW RBC AUTO: 49.9 FL (ref 35.1–46.3)
EOSINOPHIL # BLD AUTO: 0.04 X10(3) UL (ref 0–0.7)
EOSINOPHIL NFR BLD AUTO: 0.8 %
ERYTHROCYTE [DISTWIDTH] IN BLOOD BY AUTOMATED COUNT: 14 % (ref 11–15)
GLUCOSE BLD-MCNC: 129 MG/DL (ref 70–99)
HCT VFR BLD AUTO: 40.3 % (ref 35–48)
HGB BLD-MCNC: 12.7 G/DL (ref 12–16)
IMM GRANULOCYTES # BLD AUTO: 0.01 X10(3) UL (ref 0–1)
IMM GRANULOCYTES NFR BLD: 0.2 %
IRON SATURATION: 20 % (ref 15–50)
IRON SERPL-MCNC: 61 UG/DL (ref 50–170)
LYMPHOCYTES # BLD AUTO: 1.5 X10(3) UL (ref 1–4)
LYMPHOCYTES NFR BLD AUTO: 29.9 %
MCH RBC QN AUTO: 30.4 PG (ref 26–34)
MCHC RBC AUTO-ENTMCNC: 31.5 G/DL (ref 31–37)
MCV RBC AUTO: 96.4 FL (ref 80–100)
MONOCYTES # BLD AUTO: 0.25 X10(3) UL (ref 0.1–1)
MONOCYTES NFR BLD AUTO: 5 %
NEUTROPHILS # BLD AUTO: 3.21 X10 (3) UL (ref 1.5–7.7)
NEUTROPHILS # BLD AUTO: 3.21 X10(3) UL (ref 1.5–7.7)
NEUTROPHILS NFR BLD AUTO: 63.9 %
PLATELET # BLD AUTO: 222 10(3)UL (ref 150–450)
POTASSIUM SERPL-SCNC: 4.6 MMOL/L (ref 3.5–5.1)
RBC # BLD AUTO: 4.18 X10(6)UL (ref 3.8–5.3)
TOTAL IRON BINDING CAPACITY: 302 UG/DL (ref 240–450)
TRANSFERRIN SERPL-MCNC: 203 MG/DL (ref 200–360)
WBC # BLD AUTO: 5 X10(3) UL (ref 4–11)

## 2020-01-24 PROCEDURE — 83540 ASSAY OF IRON: CPT

## 2020-01-24 PROCEDURE — 85025 COMPLETE CBC W/AUTO DIFF WBC: CPT

## 2020-01-24 PROCEDURE — 82565 ASSAY OF CREATININE: CPT

## 2020-01-24 PROCEDURE — 82728 ASSAY OF FERRITIN: CPT

## 2020-01-24 PROCEDURE — 84132 ASSAY OF SERUM POTASSIUM: CPT

## 2020-01-24 PROCEDURE — 36415 COLL VENOUS BLD VENIPUNCTURE: CPT

## 2020-01-24 PROCEDURE — 84466 ASSAY OF TRANSFERRIN: CPT

## 2020-01-24 PROCEDURE — 82947 ASSAY GLUCOSE BLOOD QUANT: CPT

## 2020-02-07 ENCOUNTER — LAB ENCOUNTER (OUTPATIENT)
Dept: LAB | Age: 60
End: 2020-02-07
Attending: INTERNAL MEDICINE
Payer: COMMERCIAL

## 2020-02-07 DIAGNOSIS — Z94.0 RENAL TRANSPLANT RECIPIENT: ICD-10-CM

## 2020-02-07 DIAGNOSIS — Z79.899 ENCOUNTER FOR LONG-TERM (CURRENT) USE OF MEDICATIONS: ICD-10-CM

## 2020-02-07 DIAGNOSIS — B25.9: ICD-10-CM

## 2020-02-07 DIAGNOSIS — R82.5 ELEVATED URINE LEVELS OF DRUGS, MEDICAMENTS AND BIOLOGICAL SUBSTANCES: Primary | ICD-10-CM

## 2020-02-07 DIAGNOSIS — D84.9 IMMUNOSUPPRESSED STATUS (HCC): ICD-10-CM

## 2020-02-07 LAB
BASOPHILS # BLD AUTO: 0.02 X10(3) UL (ref 0–0.2)
BASOPHILS NFR BLD AUTO: 0.4 %
CREAT BLD-MCNC: 0.89 MG/DL (ref 0.55–1.02)
DEPRECATED HBV CORE AB SER IA-ACNC: 109.7 NG/ML (ref 18–340)
DEPRECATED RDW RBC AUTO: 45.4 FL (ref 35.1–46.3)
EOSINOPHIL # BLD AUTO: 0.04 X10(3) UL (ref 0–0.7)
EOSINOPHIL NFR BLD AUTO: 0.7 %
ERYTHROCYTE [DISTWIDTH] IN BLOOD BY AUTOMATED COUNT: 13.3 % (ref 11–15)
GLUCOSE BLD-MCNC: 162 MG/DL (ref 70–99)
HCT VFR BLD AUTO: 41.1 % (ref 35–48)
HGB BLD-MCNC: 13.5 G/DL (ref 12–16)
IMM GRANULOCYTES # BLD AUTO: 0.01 X10(3) UL (ref 0–1)
IMM GRANULOCYTES NFR BLD: 0.2 %
IRON SATURATION: 21 % (ref 15–50)
IRON SERPL-MCNC: 66 UG/DL (ref 50–170)
LYMPHOCYTES # BLD AUTO: 1.49 X10(3) UL (ref 1–4)
LYMPHOCYTES NFR BLD AUTO: 27.7 %
MCH RBC QN AUTO: 31 PG (ref 26–34)
MCHC RBC AUTO-ENTMCNC: 32.8 G/DL (ref 31–37)
MCV RBC AUTO: 94.3 FL (ref 80–100)
MONOCYTES # BLD AUTO: 0.25 X10(3) UL (ref 0.1–1)
MONOCYTES NFR BLD AUTO: 4.6 %
NEUTROPHILS # BLD AUTO: 3.57 X10 (3) UL (ref 1.5–7.7)
NEUTROPHILS # BLD AUTO: 3.57 X10(3) UL (ref 1.5–7.7)
NEUTROPHILS NFR BLD AUTO: 66.4 %
PLATELET # BLD AUTO: 227 10(3)UL (ref 150–450)
POTASSIUM SERPL-SCNC: 4.8 MMOL/L (ref 3.5–5.1)
RBC # BLD AUTO: 4.36 X10(6)UL (ref 3.8–5.3)
TOTAL IRON BINDING CAPACITY: 314 UG/DL (ref 240–450)
TRANSFERRIN SERPL-MCNC: 211 MG/DL (ref 200–360)
WBC # BLD AUTO: 5.4 X10(3) UL (ref 4–11)

## 2020-02-07 PROCEDURE — 84466 ASSAY OF TRANSFERRIN: CPT

## 2020-02-07 PROCEDURE — 82728 ASSAY OF FERRITIN: CPT

## 2020-02-07 PROCEDURE — 36415 COLL VENOUS BLD VENIPUNCTURE: CPT

## 2020-02-07 PROCEDURE — 82947 ASSAY GLUCOSE BLOOD QUANT: CPT

## 2020-02-07 PROCEDURE — 83540 ASSAY OF IRON: CPT

## 2020-02-07 PROCEDURE — 82565 ASSAY OF CREATININE: CPT

## 2020-02-07 PROCEDURE — 85025 COMPLETE CBC W/AUTO DIFF WBC: CPT

## 2020-02-07 PROCEDURE — 84132 ASSAY OF SERUM POTASSIUM: CPT

## 2020-02-18 ENCOUNTER — LAB ENCOUNTER (OUTPATIENT)
Dept: LAB | Age: 60
End: 2020-02-18
Attending: INTERNAL MEDICINE
Payer: COMMERCIAL

## 2020-02-18 DIAGNOSIS — B25.9: ICD-10-CM

## 2020-02-18 DIAGNOSIS — D89.9 IMMUNE DISORDER (HCC): ICD-10-CM

## 2020-02-18 DIAGNOSIS — Z79.899 ENCOUNTER FOR LONG-TERM (CURRENT) DRUG USE: ICD-10-CM

## 2020-02-18 DIAGNOSIS — Z94.4 TRANSPLANTED LIVER (HCC): Primary | ICD-10-CM

## 2020-02-18 PROCEDURE — 36415 COLL VENOUS BLD VENIPUNCTURE: CPT

## 2020-03-13 ENCOUNTER — LAB ENCOUNTER (OUTPATIENT)
Dept: LAB | Age: 60
End: 2020-03-13
Attending: INTERNAL MEDICINE
Payer: COMMERCIAL

## 2020-03-13 DIAGNOSIS — Z79.899 ENCOUNTER FOR LONG-TERM (CURRENT) USE OF MEDICATIONS: ICD-10-CM

## 2020-03-13 DIAGNOSIS — B25.9: ICD-10-CM

## 2020-03-13 DIAGNOSIS — D84.9 IMMUNOSUPPRESSED STATUS (HCC): ICD-10-CM

## 2020-03-13 DIAGNOSIS — Z94.0 RENAL TRANSPLANT RECIPIENT: ICD-10-CM

## 2020-03-13 LAB
BASOPHILS # BLD AUTO: 0.01 X10(3) UL (ref 0–0.2)
BASOPHILS NFR BLD AUTO: 0.2 %
CREAT BLD-MCNC: 1.13 MG/DL (ref 0.55–1.02)
DEPRECATED RDW RBC AUTO: 42.1 FL (ref 35.1–46.3)
EOSINOPHIL # BLD AUTO: 0.12 X10(3) UL (ref 0–0.7)
EOSINOPHIL NFR BLD AUTO: 2 %
ERYTHROCYTE [DISTWIDTH] IN BLOOD BY AUTOMATED COUNT: 12.4 % (ref 11–15)
GLUCOSE BLD-MCNC: 197 MG/DL (ref 70–99)
HCT VFR BLD AUTO: 38.9 % (ref 35–48)
HGB BLD-MCNC: 12.7 G/DL (ref 12–16)
IMM GRANULOCYTES # BLD AUTO: 0.02 X10(3) UL (ref 0–1)
IMM GRANULOCYTES NFR BLD: 0.3 %
LYMPHOCYTES # BLD AUTO: 1.44 X10(3) UL (ref 1–4)
LYMPHOCYTES NFR BLD AUTO: 23.6 %
MCH RBC QN AUTO: 30.1 PG (ref 26–34)
MCHC RBC AUTO-ENTMCNC: 32.6 G/DL (ref 31–37)
MCV RBC AUTO: 92.2 FL (ref 80–100)
MONOCYTES # BLD AUTO: 0.64 X10(3) UL (ref 0.1–1)
MONOCYTES NFR BLD AUTO: 10.5 %
NEUTROPHILS # BLD AUTO: 3.87 X10 (3) UL (ref 1.5–7.7)
NEUTROPHILS # BLD AUTO: 3.87 X10(3) UL (ref 1.5–7.7)
NEUTROPHILS NFR BLD AUTO: 63.4 %
PLATELET # BLD AUTO: 186 10(3)UL (ref 150–450)
POTASSIUM SERPL-SCNC: 5.1 MMOL/L (ref 3.5–5.1)
RBC # BLD AUTO: 4.22 X10(6)UL (ref 3.8–5.3)
WBC # BLD AUTO: 6.1 X10(3) UL (ref 4–11)

## 2020-03-13 PROCEDURE — 84132 ASSAY OF SERUM POTASSIUM: CPT

## 2020-03-13 PROCEDURE — 36415 COLL VENOUS BLD VENIPUNCTURE: CPT

## 2020-03-13 PROCEDURE — 82565 ASSAY OF CREATININE: CPT

## 2020-03-13 PROCEDURE — 85025 COMPLETE CBC W/AUTO DIFF WBC: CPT

## 2020-03-13 PROCEDURE — 82947 ASSAY GLUCOSE BLOOD QUANT: CPT

## 2020-05-05 ENCOUNTER — LAB ENCOUNTER (OUTPATIENT)
Dept: LAB | Age: 60
End: 2020-05-05
Attending: INTERNAL MEDICINE
Payer: MEDICARE

## 2020-05-05 DIAGNOSIS — D84.9 IMMUNOSUPPRESSED STATUS (HCC): ICD-10-CM

## 2020-05-05 DIAGNOSIS — Z94.0 RENAL TRANSPLANT RECIPIENT: ICD-10-CM

## 2020-05-05 DIAGNOSIS — B25.9: ICD-10-CM

## 2020-05-05 DIAGNOSIS — Z79.899 ENCOUNTER FOR LONG-TERM (CURRENT) USE OF MEDICATIONS: ICD-10-CM

## 2020-05-05 PROCEDURE — 85025 COMPLETE CBC W/AUTO DIFF WBC: CPT

## 2020-05-05 PROCEDURE — 84132 ASSAY OF SERUM POTASSIUM: CPT

## 2020-05-05 PROCEDURE — 84466 ASSAY OF TRANSFERRIN: CPT

## 2020-05-05 PROCEDURE — 36415 COLL VENOUS BLD VENIPUNCTURE: CPT

## 2020-05-05 PROCEDURE — 82947 ASSAY GLUCOSE BLOOD QUANT: CPT

## 2020-05-05 PROCEDURE — 82565 ASSAY OF CREATININE: CPT

## 2020-05-05 PROCEDURE — 83540 ASSAY OF IRON: CPT

## 2020-06-09 ENCOUNTER — LAB ENCOUNTER (OUTPATIENT)
Dept: LAB | Age: 60
End: 2020-06-09
Attending: INTERNAL MEDICINE
Payer: MEDICARE

## 2020-06-09 DIAGNOSIS — Z94.0 TRANSPLANTED KIDNEY: Primary | ICD-10-CM

## 2020-06-09 DIAGNOSIS — Z79.899 ENCOUNTER FOR LONG-TERM (CURRENT) USE OF OTHER MEDICATIONS: ICD-10-CM

## 2020-06-09 DIAGNOSIS — D84.9 IMMUNODEFICIENCY, UNSPECIFIED (HCC): ICD-10-CM

## 2020-06-09 DIAGNOSIS — Z79.899 HIGH RISK MEDICATION USE: ICD-10-CM

## 2020-06-09 PROCEDURE — 83036 HEMOGLOBIN GLYCOSYLATED A1C: CPT

## 2020-06-09 PROCEDURE — 84100 ASSAY OF PHOSPHORUS: CPT

## 2020-06-09 PROCEDURE — 83970 ASSAY OF PARATHORMONE: CPT

## 2020-06-09 PROCEDURE — 85025 COMPLETE CBC W/AUTO DIFF WBC: CPT

## 2020-06-09 PROCEDURE — 84450 TRANSFERASE (AST) (SGOT): CPT

## 2020-06-09 PROCEDURE — 80061 LIPID PANEL: CPT

## 2020-06-09 PROCEDURE — 80048 BASIC METABOLIC PNL TOTAL CA: CPT

## 2020-06-09 PROCEDURE — 82306 VITAMIN D 25 HYDROXY: CPT

## 2020-06-09 PROCEDURE — 36415 COLL VENOUS BLD VENIPUNCTURE: CPT

## 2020-08-13 ENCOUNTER — LAB ENCOUNTER (OUTPATIENT)
Dept: LAB | Age: 60
End: 2020-08-13
Payer: COMMERCIAL

## 2020-08-13 DIAGNOSIS — Z94.0 TRANSPLANTED KIDNEY: Primary | ICD-10-CM

## 2020-08-13 DIAGNOSIS — Z79.899 HIGH RISK MEDICATION USE: ICD-10-CM

## 2020-08-13 DIAGNOSIS — D84.9 IMMUNODEFICIENCY, UNSPECIFIED (HCC): ICD-10-CM

## 2020-08-13 LAB
ANION GAP SERPL CALC-SCNC: 8 MMOL/L (ref 0–18)
AST SERPL-CCNC: 18 U/L (ref 15–37)
BACTERIA UR QL AUTO: NEGATIVE /HPF
BASOPHILS # BLD AUTO: 0.01 X10(3) UL (ref 0–0.2)
BASOPHILS NFR BLD AUTO: 0.1 %
BILIRUB UR QL: NEGATIVE
BUN BLD-MCNC: 14 MG/DL (ref 7–18)
BUN/CREAT SERPL: 17.1 (ref 10–20)
CALCIUM BLD-MCNC: 9.1 MG/DL (ref 8.5–10.1)
CHLORIDE SERPL-SCNC: 101 MMOL/L (ref 98–112)
CHOLEST SMN-MCNC: 145 MG/DL (ref ?–200)
CO2 SERPL-SCNC: 25 MMOL/L (ref 21–32)
COLOR UR: YELLOW
CREAT BLD-MCNC: 0.82 MG/DL (ref 0.55–1.02)
CREAT UR-SCNC: 45.3 MG/DL
CREAT UR-SCNC: 45.3 MG/DL
DEPRECATED RDW RBC AUTO: 40.7 FL (ref 35.1–46.3)
EOSINOPHIL # BLD AUTO: 0.09 X10(3) UL (ref 0–0.7)
EOSINOPHIL NFR BLD AUTO: 1.2 %
ERYTHROCYTE [DISTWIDTH] IN BLOOD BY AUTOMATED COUNT: 13.1 % (ref 11–15)
EST. AVERAGE GLUCOSE BLD GHB EST-MCNC: 154 MG/DL (ref 68–126)
GLUCOSE BLD-MCNC: 154 MG/DL (ref 70–99)
GLUCOSE UR-MCNC: NEGATIVE MG/DL
HBA1C MFR BLD HPLC: 7 % (ref ?–5.7)
HCT VFR BLD AUTO: 38.9 % (ref 35–48)
HDLC SERPL-MCNC: 63 MG/DL (ref 40–59)
HGB BLD-MCNC: 12.9 G/DL (ref 12–16)
HGB UR QL STRIP.AUTO: NEGATIVE
IMM GRANULOCYTES # BLD AUTO: 0.02 X10(3) UL (ref 0–1)
IMM GRANULOCYTES NFR BLD: 0.3 %
KETONES UR-MCNC: NEGATIVE MG/DL
LDLC SERPL CALC-MCNC: 59 MG/DL (ref ?–100)
LEUKOCYTE ESTERASE UR QL STRIP.AUTO: NEGATIVE
LYMPHOCYTES # BLD AUTO: 2.65 X10(3) UL (ref 1–4)
LYMPHOCYTES NFR BLD AUTO: 35.5 %
MCH RBC QN AUTO: 28.7 PG (ref 26–34)
MCHC RBC AUTO-ENTMCNC: 33.2 G/DL (ref 31–37)
MCV RBC AUTO: 86.6 FL (ref 80–100)
MICROALBUMIN UR-MCNC: 0.7 MG/DL
MICROALBUMIN/CREAT 24H UR-RTO: 15.5 UG/MG (ref ?–30)
MONOCYTES # BLD AUTO: 0.61 X10(3) UL (ref 0.1–1)
MONOCYTES NFR BLD AUTO: 8.2 %
NEUTROPHILS # BLD AUTO: 4.08 X10 (3) UL (ref 1.5–7.7)
NEUTROPHILS # BLD AUTO: 4.08 X10(3) UL (ref 1.5–7.7)
NEUTROPHILS NFR BLD AUTO: 54.7 %
NITRITE UR QL STRIP.AUTO: NEGATIVE
NONHDLC SERPL-MCNC: 82 MG/DL (ref ?–130)
OSMOLALITY SERPL CALC.SUM OF ELEC: 282 MOSM/KG (ref 275–295)
PATIENT FASTING Y/N/NP: YES
PATIENT FASTING Y/N/NP: YES
PH UR: 6 [PH] (ref 5–8)
PHOSPHATE SERPL-MCNC: 3.3 MG/DL (ref 2.5–4.9)
PLATELET # BLD AUTO: 204 10(3)UL (ref 150–450)
POTASSIUM SERPL-SCNC: 4.4 MMOL/L (ref 3.5–5.1)
PROT UR-MCNC: 5.4 MG/DL
PROT UR-MCNC: NEGATIVE MG/DL
PROT/CREAT UR-RTO: 0.12
PTH-INTACT SERPL-MCNC: 68.5 PG/ML (ref 18.5–88)
RBC # BLD AUTO: 4.49 X10(6)UL (ref 3.8–5.3)
RBC #/AREA URNS AUTO: <1 /HPF
SODIUM SERPL-SCNC: 134 MMOL/L (ref 136–145)
SP GR UR STRIP: 1.01 (ref 1–1.03)
TRIGL SERPL-MCNC: 114 MG/DL (ref 30–149)
UROBILINOGEN UR STRIP-ACNC: <2
VLDLC SERPL CALC-MCNC: 23 MG/DL (ref 0–30)
WBC # BLD AUTO: 7.5 X10(3) UL (ref 4–11)
WBC #/AREA URNS AUTO: <1 /HPF

## 2020-08-13 PROCEDURE — 82570 ASSAY OF URINE CREATININE: CPT

## 2020-08-13 PROCEDURE — 83036 HEMOGLOBIN GLYCOSYLATED A1C: CPT

## 2020-08-13 PROCEDURE — 82043 UR ALBUMIN QUANTITATIVE: CPT

## 2020-08-13 PROCEDURE — 81015 MICROSCOPIC EXAM OF URINE: CPT

## 2020-08-13 PROCEDURE — 84450 TRANSFERASE (AST) (SGOT): CPT

## 2020-08-13 PROCEDURE — 84100 ASSAY OF PHOSPHORUS: CPT

## 2020-08-13 PROCEDURE — 83970 ASSAY OF PARATHORMONE: CPT

## 2020-08-13 PROCEDURE — 82306 VITAMIN D 25 HYDROXY: CPT

## 2020-08-13 PROCEDURE — 80061 LIPID PANEL: CPT

## 2020-08-13 PROCEDURE — 84156 ASSAY OF PROTEIN URINE: CPT

## 2020-08-13 PROCEDURE — 81001 URINALYSIS AUTO W/SCOPE: CPT

## 2020-08-13 PROCEDURE — 85025 COMPLETE CBC W/AUTO DIFF WBC: CPT

## 2020-08-13 PROCEDURE — 80180 DRUG SCRN QUAN MYCOPHENOLATE: CPT

## 2020-08-13 PROCEDURE — 80048 BASIC METABOLIC PNL TOTAL CA: CPT

## 2020-08-13 PROCEDURE — 36415 COLL VENOUS BLD VENIPUNCTURE: CPT

## 2020-08-13 PROCEDURE — 87799 DETECT AGENT NOS DNA QUANT: CPT

## 2020-08-14 LAB — 25(OH)D3 SERPL-MCNC: 39 NG/ML (ref 30–100)

## 2020-08-17 LAB
MYCOPHENOLIC ACID GLUCURONIDE: 41.1 UG/ML
MYCOPHENOLIC ACID: 1.1 UG/ML

## 2020-09-24 ENCOUNTER — LAB ENCOUNTER (OUTPATIENT)
Dept: LAB | Age: 60
End: 2020-09-24
Attending: INTERNAL MEDICINE
Payer: COMMERCIAL

## 2020-09-24 DIAGNOSIS — Z79.899 ENCOUNTER FOR LONG-TERM (CURRENT) USE OF OTHER MEDICATIONS: ICD-10-CM

## 2020-09-24 DIAGNOSIS — Z94.0 TRANSPLANTED KIDNEY: ICD-10-CM

## 2020-09-24 DIAGNOSIS — Z79.899 HIGH RISK MEDICATION USE: ICD-10-CM

## 2020-09-24 DIAGNOSIS — D84.9 IMMUNOSUPPRESSED STATUS (HCC): ICD-10-CM

## 2020-09-24 DIAGNOSIS — D84.9 IMMUNODEFICIENCY, UNSPECIFIED (HCC): ICD-10-CM

## 2020-09-24 DIAGNOSIS — Z94.0 KIDNEY REPLACED BY TRANSPLANT: Primary | ICD-10-CM

## 2020-09-24 LAB
BASOPHILS # BLD AUTO: 0.02 X10(3) UL (ref 0–0.2)
BASOPHILS NFR BLD AUTO: 0.3 %
CREAT BLD-MCNC: 0.8 MG/DL
DEPRECATED RDW RBC AUTO: 40.8 FL (ref 35.1–46.3)
EOSINOPHIL # BLD AUTO: 0.27 X10(3) UL (ref 0–0.7)
EOSINOPHIL NFR BLD AUTO: 3.6 %
ERYTHROCYTE [DISTWIDTH] IN BLOOD BY AUTOMATED COUNT: 13 % (ref 11–15)
GLUCOSE BLD-MCNC: 106 MG/DL (ref 70–99)
HCT VFR BLD AUTO: 38.2 %
HGB BLD-MCNC: 12.5 G/DL
IMM GRANULOCYTES # BLD AUTO: 0.02 X10(3) UL (ref 0–1)
IMM GRANULOCYTES NFR BLD: 0.3 %
LYMPHOCYTES # BLD AUTO: 2 X10(3) UL (ref 1–4)
LYMPHOCYTES NFR BLD AUTO: 26.6 %
MCH RBC QN AUTO: 28.3 PG (ref 26–34)
MCHC RBC AUTO-ENTMCNC: 32.7 G/DL (ref 31–37)
MCV RBC AUTO: 86.4 FL
MONOCYTES # BLD AUTO: 0.72 X10(3) UL (ref 0.1–1)
MONOCYTES NFR BLD AUTO: 9.6 %
NEUTROPHILS # BLD AUTO: 4.48 X10 (3) UL (ref 1.5–7.7)
NEUTROPHILS # BLD AUTO: 4.48 X10(3) UL (ref 1.5–7.7)
NEUTROPHILS NFR BLD AUTO: 59.6 %
PLATELET # BLD AUTO: 277 10(3)UL (ref 150–450)
POTASSIUM SERPL-SCNC: 4.4 MMOL/L (ref 3.5–5.1)
RBC # BLD AUTO: 4.42 X10(6)UL
WBC # BLD AUTO: 7.5 X10(3) UL (ref 4–11)

## 2020-09-24 PROCEDURE — 84132 ASSAY OF SERUM POTASSIUM: CPT

## 2020-09-24 PROCEDURE — 36415 COLL VENOUS BLD VENIPUNCTURE: CPT

## 2020-09-24 PROCEDURE — 82565 ASSAY OF CREATININE: CPT

## 2020-09-24 PROCEDURE — 82947 ASSAY GLUCOSE BLOOD QUANT: CPT

## 2020-09-24 PROCEDURE — 85025 COMPLETE CBC W/AUTO DIFF WBC: CPT

## 2020-09-25 ENCOUNTER — HOSPITAL ENCOUNTER (OUTPATIENT)
Dept: MAMMOGRAPHY | Age: 60
Discharge: HOME | End: 2020-09-25
Attending: OBSTETRICS & GYNECOLOGY
Payer: COMMERCIAL

## 2020-09-25 DIAGNOSIS — Z12.31 SCREENING MAMMOGRAM, ENCOUNTER FOR: ICD-10-CM

## 2020-09-25 DIAGNOSIS — Z80.3 FAMILY HISTORY OF BREAST CANCER IN SISTER: ICD-10-CM

## 2020-09-25 PROCEDURE — 77067 SCR MAMMO BI INCL CAD: CPT | Performed by: OBSTETRICS & GYNECOLOGY

## 2020-09-25 PROCEDURE — 77063 BREAST TOMOSYNTHESIS BI: CPT | Performed by: OBSTETRICS & GYNECOLOGY

## 2020-12-10 ENCOUNTER — LAB ENCOUNTER (OUTPATIENT)
Dept: LAB | Age: 60
End: 2020-12-10
Attending: INTERNAL MEDICINE
Payer: COMMERCIAL

## 2020-12-10 DIAGNOSIS — D84.9 IMMUNOSUPPRESSED STATUS (HCC): ICD-10-CM

## 2020-12-10 DIAGNOSIS — Z94.0 TRANSPLANTED KIDNEY: ICD-10-CM

## 2020-12-10 DIAGNOSIS — Z79.899 HIGH RISK MEDICATION USE: ICD-10-CM

## 2020-12-10 DIAGNOSIS — Z94.0 KIDNEY REPLACED BY TRANSPLANT: Primary | ICD-10-CM

## 2020-12-10 DIAGNOSIS — Z79.899 ENCOUNTER FOR LONG-TERM (CURRENT) USE OF OTHER MEDICATIONS: ICD-10-CM

## 2020-12-10 DIAGNOSIS — D84.9 IMMUNODEFICIENCY, UNSPECIFIED (HCC): ICD-10-CM

## 2020-12-10 PROCEDURE — 84132 ASSAY OF SERUM POTASSIUM: CPT

## 2020-12-10 PROCEDURE — 82947 ASSAY GLUCOSE BLOOD QUANT: CPT

## 2020-12-10 PROCEDURE — 36415 COLL VENOUS BLD VENIPUNCTURE: CPT

## 2020-12-10 PROCEDURE — 85025 COMPLETE CBC W/AUTO DIFF WBC: CPT

## 2020-12-10 PROCEDURE — 82565 ASSAY OF CREATININE: CPT

## 2021-03-18 ENCOUNTER — LAB ENCOUNTER (OUTPATIENT)
Dept: LAB | Age: 61
End: 2021-03-18
Attending: INTERNAL MEDICINE
Payer: MEDICARE

## 2021-03-18 ENCOUNTER — APPOINTMENT (OUTPATIENT)
Dept: LAB | Age: 61
End: 2021-03-18
Attending: INTERNAL MEDICINE
Payer: MEDICARE

## 2021-03-18 DIAGNOSIS — D84.9 IMMUNODEFICIENCY, UNSPECIFIED (HCC): ICD-10-CM

## 2021-03-18 DIAGNOSIS — Z79.899 ENCOUNTER FOR LONG-TERM (CURRENT) USE OF OTHER MEDICATIONS: ICD-10-CM

## 2021-03-18 DIAGNOSIS — Z94.0 KIDNEY REPLACED BY TRANSPLANT: ICD-10-CM

## 2021-03-18 DIAGNOSIS — Z79.899 HIGH RISK MEDICATION USE: ICD-10-CM

## 2021-03-18 DIAGNOSIS — Z94.0 TRANSPLANTED KIDNEY: ICD-10-CM

## 2021-03-18 DIAGNOSIS — D84.9 IMMUNOSUPPRESSED STATUS (HCC): ICD-10-CM

## 2021-03-18 LAB
BASOPHILS # BLD AUTO: 0.01 X10(3) UL (ref 0–0.2)
BASOPHILS NFR BLD AUTO: 0.1 %
CREAT BLD-MCNC: 0.95 MG/DL
DEPRECATED RDW RBC AUTO: 42.5 FL (ref 35.1–46.3)
EOSINOPHIL # BLD AUTO: 0.12 X10(3) UL (ref 0–0.7)
EOSINOPHIL NFR BLD AUTO: 1.7 %
ERYTHROCYTE [DISTWIDTH] IN BLOOD BY AUTOMATED COUNT: 13.4 % (ref 11–15)
GLUCOSE BLD-MCNC: 172 MG/DL (ref 70–99)
HCT VFR BLD AUTO: 41.1 %
HGB BLD-MCNC: 13.1 G/DL
IMM GRANULOCYTES # BLD AUTO: 0.01 X10(3) UL (ref 0–1)
IMM GRANULOCYTES NFR BLD: 0.1 %
LYMPHOCYTES # BLD AUTO: 2.27 X10(3) UL (ref 1–4)
LYMPHOCYTES NFR BLD AUTO: 32.2 %
MCH RBC QN AUTO: 27.8 PG (ref 26–34)
MCHC RBC AUTO-ENTMCNC: 31.9 G/DL (ref 31–37)
MCV RBC AUTO: 87.3 FL
MONOCYTES # BLD AUTO: 0.65 X10(3) UL (ref 0.1–1)
MONOCYTES NFR BLD AUTO: 9.2 %
NEUTROPHILS # BLD AUTO: 4 X10 (3) UL (ref 1.5–7.7)
NEUTROPHILS # BLD AUTO: 4 X10(3) UL (ref 1.5–7.7)
NEUTROPHILS NFR BLD AUTO: 56.7 %
PLATELET # BLD AUTO: 254 10(3)UL (ref 150–450)
POTASSIUM SERPL-SCNC: 4.7 MMOL/L (ref 3.5–5.1)
RBC # BLD AUTO: 4.71 X10(6)UL
WBC # BLD AUTO: 7.1 X10(3) UL (ref 4–11)

## 2021-03-18 PROCEDURE — 36415 COLL VENOUS BLD VENIPUNCTURE: CPT

## 2021-03-18 PROCEDURE — 85025 COMPLETE CBC W/AUTO DIFF WBC: CPT

## 2021-03-18 PROCEDURE — 82565 ASSAY OF CREATININE: CPT

## 2021-03-18 PROCEDURE — 82947 ASSAY GLUCOSE BLOOD QUANT: CPT

## 2021-03-18 PROCEDURE — 84132 ASSAY OF SERUM POTASSIUM: CPT

## 2021-03-20 DIAGNOSIS — Z23 NEED FOR VACCINATION: ICD-10-CM

## 2021-04-08 ENCOUNTER — LAB ENCOUNTER (OUTPATIENT)
Dept: LAB | Age: 61
End: 2021-04-08
Attending: INTERNAL MEDICINE
Payer: MEDICARE

## 2021-04-08 DIAGNOSIS — Z94.0 KIDNEY REPLACED BY TRANSPLANT: ICD-10-CM

## 2021-04-08 DIAGNOSIS — Z79.899 HIGH RISK MEDICATION USE: ICD-10-CM

## 2021-04-08 DIAGNOSIS — D84.9 IMMUNOSUPPRESSED STATUS (HCC): ICD-10-CM

## 2021-04-08 PROCEDURE — 36415 COLL VENOUS BLD VENIPUNCTURE: CPT

## 2021-05-21 ENCOUNTER — LAB ENCOUNTER (OUTPATIENT)
Dept: LAB | Age: 61
End: 2021-05-21
Attending: INTERNAL MEDICINE
Payer: MEDICARE

## 2021-05-21 DIAGNOSIS — Z94.0 KIDNEY REPLACED BY TRANSPLANT: ICD-10-CM

## 2021-05-21 DIAGNOSIS — Z79.899 HIGH RISK MEDICATION USE: ICD-10-CM

## 2021-05-21 DIAGNOSIS — D84.9 IMMUNOSUPPRESSED STATUS (HCC): ICD-10-CM

## 2021-05-21 DIAGNOSIS — Z94.0 TRANSPLANTED KIDNEY: ICD-10-CM

## 2021-05-21 DIAGNOSIS — D84.9 IMMUNODEFICIENCY, UNSPECIFIED (HCC): ICD-10-CM

## 2021-05-21 DIAGNOSIS — Z79.899 ENCOUNTER FOR LONG-TERM (CURRENT) USE OF OTHER MEDICATIONS: ICD-10-CM

## 2021-05-21 PROCEDURE — 82565 ASSAY OF CREATININE: CPT

## 2021-05-21 PROCEDURE — 82947 ASSAY GLUCOSE BLOOD QUANT: CPT

## 2021-05-21 PROCEDURE — 85025 COMPLETE CBC W/AUTO DIFF WBC: CPT

## 2021-05-21 PROCEDURE — 36415 COLL VENOUS BLD VENIPUNCTURE: CPT

## 2021-05-21 PROCEDURE — 84132 ASSAY OF SERUM POTASSIUM: CPT

## 2021-07-20 ENCOUNTER — APPOINTMENT (OUTPATIENT)
Dept: INTERVENTIONAL RADIOLOGY/VASCULAR | Facility: HOSPITAL | Age: 61
End: 2021-07-20
Attending: INTERNAL MEDICINE
Payer: MEDICARE

## 2021-07-20 ENCOUNTER — APPOINTMENT (OUTPATIENT)
Dept: GENERAL RADIOLOGY | Facility: HOSPITAL | Age: 61
End: 2021-07-20
Attending: EMERGENCY MEDICINE
Payer: MEDICARE

## 2021-07-20 ENCOUNTER — HOSPITAL ENCOUNTER (OUTPATIENT)
Facility: HOSPITAL | Age: 61
Setting detail: OBSERVATION
Discharge: HOME | End: 2021-07-23
Attending: EMERGENCY MEDICINE | Admitting: HOSPITALIST
Payer: MEDICARE

## 2021-07-20 DIAGNOSIS — I44.7 NEW ONSET LEFT BUNDLE BRANCH BLOCK (LBBB): ICD-10-CM

## 2021-07-20 DIAGNOSIS — I48.91 ATRIAL FIBRILLATION, NEW ONSET (HCC): ICD-10-CM

## 2021-07-20 DIAGNOSIS — I20.0 UNSTABLE ANGINA (HCC): Primary | ICD-10-CM

## 2021-07-20 LAB
ANION GAP SERPL CALC-SCNC: 9 MMOL/L (ref 0–18)
APTT PPP: 28.7 SECONDS (ref 23.2–35.3)
BASOPHILS # BLD AUTO: 0.01 X10(3) UL (ref 0–0.2)
BASOPHILS NFR BLD AUTO: 0.1 %
BUN BLD-MCNC: 19 MG/DL (ref 7–18)
BUN/CREAT SERPL: 17.6 (ref 10–20)
CALCIUM BLD-MCNC: 9 MG/DL (ref 8.5–10.1)
CHLORIDE SERPL-SCNC: 97 MMOL/L (ref 98–112)
CHOLEST SMN-MCNC: 161 MG/DL (ref ?–200)
CO2 SERPL-SCNC: 21 MMOL/L (ref 21–32)
CREAT BLD-MCNC: 1.08 MG/DL
DEPRECATED RDW RBC AUTO: 41.5 FL (ref 35.1–46.3)
EOSINOPHIL # BLD AUTO: 0.01 X10(3) UL (ref 0–0.7)
EOSINOPHIL NFR BLD AUTO: 0.1 %
ERYTHROCYTE [DISTWIDTH] IN BLOOD BY AUTOMATED COUNT: 13.1 % (ref 11–15)
GLUCOSE BLD-MCNC: 161 MG/DL (ref 70–99)
HCT VFR BLD AUTO: 39.7 %
HDLC SERPL-MCNC: 75 MG/DL (ref 40–59)
HGB BLD-MCNC: 12.8 G/DL
IMM GRANULOCYTES # BLD AUTO: 0.06 X10(3) UL (ref 0–1)
IMM GRANULOCYTES NFR BLD: 0.5 %
INR BLD: 1.07 (ref 0.9–1.2)
LDLC SERPL CALC-MCNC: 60 MG/DL (ref ?–100)
LYMPHOCYTES # BLD AUTO: 2.22 X10(3) UL (ref 1–4)
LYMPHOCYTES NFR BLD AUTO: 17.9 %
MCH RBC QN AUTO: 28.2 PG (ref 26–34)
MCHC RBC AUTO-ENTMCNC: 32.2 G/DL (ref 31–37)
MCV RBC AUTO: 87.4 FL
MONOCYTES # BLD AUTO: 0.78 X10(3) UL (ref 0.1–1)
MONOCYTES NFR BLD AUTO: 6.3 %
NEUTROPHILS # BLD AUTO: 9.32 X10 (3) UL (ref 1.5–7.7)
NEUTROPHILS # BLD AUTO: 9.32 X10(3) UL (ref 1.5–7.7)
NEUTROPHILS NFR BLD AUTO: 75.1 %
NONHDLC SERPL-MCNC: 86 MG/DL (ref ?–130)
OSMOLALITY SERPL CALC.SUM OF ELEC: 270 MOSM/KG (ref 275–295)
PLATELET # BLD AUTO: 276 10(3)UL (ref 150–450)
POTASSIUM SERPL-SCNC: 4.4 MMOL/L (ref 3.5–5.1)
PROTHROMBIN TIME: 13.7 SECONDS (ref 11.8–14.5)
RBC # BLD AUTO: 4.54 X10(6)UL
SODIUM SERPL-SCNC: 127 MMOL/L (ref 136–145)
TRIGL SERPL-MCNC: 154 MG/DL (ref 30–149)
TROPONIN I SERPL-MCNC: 0.06 NG/ML (ref ?–0.04)
VLDLC SERPL CALC-MCNC: 23 MG/DL (ref 0–30)
WBC # BLD AUTO: 12.4 X10(3) UL (ref 4–11)

## 2021-07-20 PROCEDURE — B241ZZ3 ULTRASONOGRAPHY OF MULTIPLE CORONARY ARTERIES, INTRAVASCULAR: ICD-10-PCS | Performed by: INTERNAL MEDICINE

## 2021-07-20 PROCEDURE — B2151ZZ FLUOROSCOPY OF LEFT HEART USING LOW OSMOLAR CONTRAST: ICD-10-PCS | Performed by: INTERNAL MEDICINE

## 2021-07-20 PROCEDURE — B2111ZZ FLUOROSCOPY OF MULTIPLE CORONARY ARTERIES USING LOW OSMOLAR CONTRAST: ICD-10-PCS | Performed by: INTERNAL MEDICINE

## 2021-07-20 PROCEDURE — 4A023N7 MEASUREMENT OF CARDIAC SAMPLING AND PRESSURE, LEFT HEART, PERCUTANEOUS APPROACH: ICD-10-PCS | Performed by: INTERNAL MEDICINE

## 2021-07-20 PROCEDURE — 027135Z DILATION OF CORONARY ARTERY, TWO ARTERIES WITH TWO DRUG-ELUTING INTRALUMINAL DEVICES, PERCUTANEOUS APPROACH: ICD-10-PCS | Performed by: INTERNAL MEDICINE

## 2021-07-20 PROCEDURE — 71045 X-RAY EXAM CHEST 1 VIEW: CPT | Performed by: EMERGENCY MEDICINE

## 2021-07-20 RX ORDER — ACETAMINOPHEN 325 MG/1
650 TABLET ORAL EVERY 6 HOURS PRN
Status: DISCONTINUED | OUTPATIENT
Start: 2021-07-20 | End: 2021-07-23

## 2021-07-20 RX ORDER — MIDAZOLAM HYDROCHLORIDE 1 MG/ML
INJECTION INTRAMUSCULAR; INTRAVENOUS
Status: DISCONTINUED
Start: 2021-07-20 | End: 2021-07-20 | Stop reason: WASHOUT

## 2021-07-20 RX ORDER — DILTIAZEM HYDROCHLORIDE 5 MG/ML
5 INJECTION INTRAVENOUS ONCE
Status: COMPLETED | OUTPATIENT
Start: 2021-07-20 | End: 2021-07-20

## 2021-07-20 RX ORDER — MYCOPHENOLIC ACID 180 MG/1
360 TABLET, DELAYED RELEASE ORAL DAILY
Status: DISCONTINUED | OUTPATIENT
Start: 2021-07-21 | End: 2021-07-23

## 2021-07-20 RX ORDER — DEXTROSE MONOHYDRATE 25 G/50ML
50 INJECTION, SOLUTION INTRAVENOUS
Status: DISCONTINUED | OUTPATIENT
Start: 2021-07-20 | End: 2021-07-23

## 2021-07-20 RX ORDER — LORAZEPAM 0.5 MG/1
0.25 TABLET ORAL AS NEEDED
Status: DISCONTINUED | OUTPATIENT
Start: 2021-07-20 | End: 2021-07-23

## 2021-07-20 RX ORDER — HEPARIN SODIUM 1000 [USP'U]/ML
INJECTION, SOLUTION INTRAVENOUS; SUBCUTANEOUS
Status: COMPLETED
Start: 2021-07-20 | End: 2021-07-20

## 2021-07-20 RX ORDER — TACROLIMUS 0.5 MG/1
0.5 CAPSULE ORAL 2 TIMES DAILY
Status: DISCONTINUED | OUTPATIENT
Start: 2021-07-21 | End: 2021-07-23

## 2021-07-20 RX ORDER — PRAVASTATIN SODIUM 20 MG
60 TABLET ORAL NIGHTLY
Status: DISCONTINUED | OUTPATIENT
Start: 2021-07-21 | End: 2021-07-23

## 2021-07-20 RX ORDER — ONDANSETRON 2 MG/ML
INJECTION INTRAMUSCULAR; INTRAVENOUS
Status: COMPLETED
Start: 2021-07-20 | End: 2021-07-20

## 2021-07-20 RX ORDER — PRASUGREL 10 MG/1
TABLET, FILM COATED ORAL
Status: COMPLETED
Start: 2021-07-20 | End: 2021-07-20

## 2021-07-20 RX ORDER — MIDAZOLAM HYDROCHLORIDE 1 MG/ML
INJECTION INTRAMUSCULAR; INTRAVENOUS
Status: COMPLETED
Start: 2021-07-20 | End: 2021-07-20

## 2021-07-20 RX ORDER — EZETIMIBE 10 MG/1
10 TABLET ORAL DAILY
Status: DISCONTINUED | OUTPATIENT
Start: 2021-07-21 | End: 2021-07-23

## 2021-07-20 RX ORDER — SODIUM CHLORIDE 9 MG/ML
INJECTION, SOLUTION INTRAVENOUS CONTINUOUS
Status: ACTIVE | OUTPATIENT
Start: 2021-07-20 | End: 2021-07-21

## 2021-07-20 RX ORDER — ASPIRIN 81 MG/1
81 TABLET ORAL DAILY
Status: DISCONTINUED | OUTPATIENT
Start: 2021-07-21 | End: 2021-07-23

## 2021-07-20 RX ORDER — ASPIRIN 81 MG/1
324 TABLET, CHEWABLE ORAL ONCE
Status: DISCONTINUED | OUTPATIENT
Start: 2021-07-20 | End: 2021-07-22

## 2021-07-20 RX ORDER — ASPIRIN 81 MG/1
TABLET, CHEWABLE ORAL
Status: COMPLETED
Start: 2021-07-20 | End: 2021-07-20

## 2021-07-20 RX ORDER — MAGNESIUM OXIDE 400 MG (241.3 MG MAGNESIUM) TABLET
400 TABLET DAILY
Status: DISCONTINUED | OUTPATIENT
Start: 2021-07-21 | End: 2021-07-23

## 2021-07-20 RX ORDER — LIDOCAINE HYDROCHLORIDE 20 MG/ML
INJECTION, SOLUTION EPIDURAL; INFILTRATION; INTRACAUDAL; PERINEURAL
Status: COMPLETED
Start: 2021-07-20 | End: 2021-07-20

## 2021-07-20 RX ORDER — CALCIUM CARBONATE 200(500)MG
500 TABLET,CHEWABLE ORAL 3 TIMES DAILY PRN
Status: DISCONTINUED | OUTPATIENT
Start: 2021-07-20 | End: 2021-07-23

## 2021-07-20 RX ORDER — PREDNISONE 1 MG/1
4 TABLET ORAL DAILY
Status: DISCONTINUED | OUTPATIENT
Start: 2021-07-21 | End: 2021-07-23

## 2021-07-20 RX ORDER — PRASUGREL 10 MG/1
10 TABLET, FILM COATED ORAL DAILY
Status: DISCONTINUED | OUTPATIENT
Start: 2021-07-21 | End: 2021-07-23

## 2021-07-20 RX ORDER — DEXTROSE MONOHYDRATE 25 G/50ML
50 INJECTION, SOLUTION INTRAVENOUS
Status: DISCONTINUED | OUTPATIENT
Start: 2021-07-20 | End: 2021-07-21

## 2021-07-21 ENCOUNTER — APPOINTMENT (OUTPATIENT)
Dept: CV DIAGNOSTICS | Facility: HOSPITAL | Age: 61
End: 2021-07-21
Attending: INTERNAL MEDICINE
Payer: MEDICARE

## 2021-07-21 LAB
ALBUMIN SERPL-MCNC: 3.1 G/DL (ref 3.4–5)
ALBUMIN/GLOB SERPL: 1.1 {RATIO} (ref 1–2)
ALP LIVER SERPL-CCNC: 72 U/L
ALT SERPL-CCNC: 32 U/L
ANION GAP SERPL CALC-SCNC: 8 MMOL/L (ref 0–18)
AST SERPL-CCNC: 22 U/L (ref 15–37)
BASOPHILS # BLD AUTO: 0.01 X10(3) UL (ref 0–0.2)
BASOPHILS NFR BLD AUTO: 0.1 %
BILIRUB SERPL-MCNC: 0.8 MG/DL (ref 0.1–2)
BUN BLD-MCNC: 14 MG/DL (ref 7–18)
BUN/CREAT SERPL: 19.2 (ref 10–20)
CALCIUM BLD-MCNC: 8.6 MG/DL (ref 8.5–10.1)
CHLORIDE SERPL-SCNC: 103 MMOL/L (ref 98–112)
CO2 SERPL-SCNC: 23 MMOL/L (ref 21–32)
CREAT BLD-MCNC: 0.73 MG/DL
DEPRECATED RDW RBC AUTO: 40 FL (ref 35.1–46.3)
EOSINOPHIL # BLD AUTO: 0.06 X10(3) UL (ref 0–0.7)
EOSINOPHIL NFR BLD AUTO: 0.6 %
ERYTHROCYTE [DISTWIDTH] IN BLOOD BY AUTOMATED COUNT: 13.1 % (ref 11–15)
EST. AVERAGE GLUCOSE BLD GHB EST-MCNC: 163 MG/DL (ref 68–126)
GLOBULIN PLAS-MCNC: 2.7 G/DL (ref 2.8–4.4)
GLUCOSE BLD-MCNC: 125 MG/DL (ref 70–99)
HAV IGM SER QL: 1.4 MG/DL (ref 1.6–2.6)
HBA1C MFR BLD HPLC: 7.3 % (ref ?–5.7)
HCT VFR BLD AUTO: 34.6 %
HGB BLD-MCNC: 11.5 G/DL
IMM GRANULOCYTES # BLD AUTO: 0.05 X10(3) UL (ref 0–1)
IMM GRANULOCYTES NFR BLD: 0.5 %
LYMPHOCYTES # BLD AUTO: 2.36 X10(3) UL (ref 1–4)
LYMPHOCYTES NFR BLD AUTO: 22.6 %
M PROTEIN MFR SERPL ELPH: 5.8 G/DL (ref 6.4–8.2)
MCH RBC QN AUTO: 28.2 PG (ref 26–34)
MCHC RBC AUTO-ENTMCNC: 33.2 G/DL (ref 31–37)
MCV RBC AUTO: 84.8 FL
MONOCYTES # BLD AUTO: 0.81 X10(3) UL (ref 0.1–1)
MONOCYTES NFR BLD AUTO: 7.8 %
NEUTROPHILS # BLD AUTO: 7.13 X10 (3) UL (ref 1.5–7.7)
NEUTROPHILS # BLD AUTO: 7.13 X10(3) UL (ref 1.5–7.7)
NEUTROPHILS NFR BLD AUTO: 68.4 %
OSMOLALITY SERPL CALC.SUM OF ELEC: 280 MOSM/KG (ref 275–295)
PLATELET # BLD AUTO: 221 10(3)UL (ref 150–450)
POTASSIUM SERPL-SCNC: 4 MMOL/L (ref 3.5–5.1)
RBC # BLD AUTO: 4.08 X10(6)UL
SODIUM SERPL-SCNC: 134 MMOL/L (ref 136–145)
WBC # BLD AUTO: 10.4 X10(3) UL (ref 4–11)

## 2021-07-21 PROCEDURE — 99205 OFFICE O/P NEW HI 60 MIN: CPT | Performed by: INTERNAL MEDICINE

## 2021-07-21 PROCEDURE — 93306 TTE W/DOPPLER COMPLETE: CPT | Performed by: INTERNAL MEDICINE

## 2021-07-21 RX ORDER — MAGNESIUM OXIDE 400 MG (241.3 MG MAGNESIUM) TABLET
800 TABLET ONCE
Status: COMPLETED | OUTPATIENT
Start: 2021-07-21 | End: 2021-07-21

## 2021-07-21 RX ORDER — MYCOPHENOLIC ACID 180 MG/1
180 TABLET, DELAYED RELEASE ORAL NIGHTLY
Status: DISCONTINUED | OUTPATIENT
Start: 2021-07-21 | End: 2021-07-23

## 2021-07-21 RX ORDER — PANTOPRAZOLE SODIUM 40 MG/1
40 TABLET, DELAYED RELEASE ORAL
Status: DISCONTINUED | OUTPATIENT
Start: 2021-07-21 | End: 2021-07-23

## 2021-07-21 RX ORDER — PREDNISONE 1 MG/1
4 TABLET ORAL
COMMUNITY

## 2021-07-21 NOTE — PLAN OF CARE
Problem: Diabetes/Glucose Control  Goal: Glucose maintained within prescribed range  Description: INTERVENTIONS:  - Monitor Blood Glucose as ordered  - Assess for signs and symptoms of hyperglycemia and hypoglycemia  - Administer ordered medications to m circulation, skin condition, hydration, nutrition and elimination needs   - Reorient and redirection as needed  - Assess for the need to continue restraints  Outcome: Progressing   Afib rate down to 60s-80s in night, cardizem off at 0330, bp remains somewh

## 2021-07-21 NOTE — H&P
[facility] Hospitalist H&P       CC: chest pain    PCP: Jesse Walker    History of Present Illness: Pt is a 60 yo with mmp including but not limited to CAD, HTN/HL, insulin dependent DMII, ESRD s/p renal transplant,  GERD, factor V Leiden is admitted for CATARACT EXTRACTION  1993   • CHOLECYSTECTOMY  2011   • EYE SURGERY  1981    vitrectomy L eye   • HAND/FINGER SURGERY UNLISTED      trigger finger relaeases on 3   • HYSTEROSCOPY  2010    polypectomy   • OTHER SURGICAL HISTORY      see scanned in document Take 0.25 mg by mouth as needed for Anxiety. , Disp: , Rfl:   Magnesium 100 MG Oral Tab, Take 400 mg by mouth daily.   , Disp: , Rfl:   Pravastatin Sodium (PRAVACHOL) 20 MG Oral Tab, Take 60 mg by mouth nightly., Disp: , Rfl:   Prasugrel HCl 10 MG Oral Tab Extremities normal, atraumatic, no  edema.    Skin: Skin color, texture, turgor normal. No visible rashes     Neurologic:  Psychiatric: No facial droop or dysarthria  irritable,  memory intact     Diagnostic Data:    CBC/Chem  Recent Labs   Lab 07/20/21  19 accuchecks. Endocrine on consult, appreciate.   Pt refusing accuchecks and monitors on her own despite staff explanations  **ESRD s/p renal tx-home meds, renal on consult, appreciate  **GERD-ppi  **factor V leiden-heme consult, appreciate    **PPx-scds for

## 2021-07-21 NOTE — PROCEDURES
[facility] - [facility]    Cardiac Cath Procedure Note  Suman Dela Cruz Patient Status:  Emergency    1960 MRN [id number]   Location [address] Attending Lidia Chung MD   Hosp Day # 0 PCP Urvashi Jeter kissing balloon inflation both at 14 chandler. Independent ballooning of the crux with a 3.5 mm balloon at 18 chandler.       Assessment:  New left bundle branch block: Likely rate dependent due to A. fib with RVR and her ischemic symptoms and troponin elevation due

## 2021-07-21 NOTE — PLAN OF CARE
Patient overall doing well. Moving around in room with stand-by assistance d/t medical equipment by help of staff or her . Denies pain. Had bedside echo and now plan for cardioversion tomorrow.  Patient tolerating cardizem at 2.5 mg. Blood pressure m INTERVENTIONS:  - Continuous cardiac monitoring, monitor vital signs, obtain 12 lead EKG if indicated  - Evaluate effectiveness of antiarrhythmic and heart rate control medications as ordered  - Initiate emergency measures for life threatening arrhythmias appropriate  Outcome: Progressing  Goal: Hemodynamic stability and optimal renal function maintained  Description: INTERVENTIONS:  - Monitor labs and assess for signs and symptoms of volume excess or deficit  - Monitor intake, output and patient weight  -

## 2021-07-21 NOTE — CONSULTS
Cardiology Consult Note    Jayesh Savage Patient Status:  Emergency    1960 MRN [id number]   Location [address] Attending Helena Leonard MD   Hosp Day # 0 PCP Jigar Huang     68-year-old female with history o 2014   • Clotting disorder [facility])     Clotting Factor 5   • DIABETES 1964   • Gastroparesis    • GERD (gastroesophageal reflux disease)    • Heart disease    • Herpes simplex    • High cholesterol    • History of carpal tunnel surgery 2009   • Hx of laminec Normocephalic, anicteric sclera, neck supple. Neck: No JVD, carotids 2+, no bruits. Cardiac: Regular rate and rhythm. S1, S2 normal. No murmur, pericardial rub, S3.  Lungs: Clear without wheezes, rales, rhonchi or dullness. Normal excursions and effort.

## 2021-07-21 NOTE — DIETARY NOTE
NUTRITION EDUCATION NOTE    Received consult for nutrition education per cardiac rehab order set. Appropriate education and handout(s) provided. See education section of Epic for specifics.     Columba Van RDN, LDN  Clinical Nutrition  Ext [phone]

## 2021-07-21 NOTE — CONSULTS
Hematology Consultation Note    Patient Name: Kimberlyn Loza   YOB: 1960   Medical Record Number: [id number]   CSN: [id number]   Consulting Physician: Charly Larsen MD  Referring Physician(s): Bianka Irizarry MD  Date of Consultation: 7/21/2021 Spondylolysis 2015   • Tendon tear, foot 2015    R foot   • Trigger finger, left 2014    L index finger and ring finger   • Trigger finger, right little finger 2015       Past Surgical History:  Past Surgical History:   Procedure Laterality Date   • BLOOD Exposure: Not Asked        Hobby Hazards: Not Asked        Sleep Concern: Not Asked        Stress Concern: Not Asked        Weight Concern: Not Asked        Special Diet: Not Asked        Back Care: Not Asked        Exercise: Yes          walk        Bike Comment:Pt s/p kidney transplant  Niacin                  OTHER (SEE COMMENTS)    Comment:Fainting  Simvastatin             OTHER (SEE COMMENTS)    Comment:Cerner Allergy Text Annotation: simvastatin  Sulfa Antibiotics       RASH  Diphenoxylate-A hepatosplenomegaly. No palpable mass. Extremities: No edema or calf tenderness. Right upper humerus with small 1 x1cm ecchymosis   Neurological: Grossly intact.       Labs:    Lab Results   Component Value Date/Time    WBC 10.4 07/21/2021 04:56 AM    RBC warfarin    --Patient has never suffered a DVT or PE.   She reports undergoing testing in light of her mother having factor V Leiden mutation prior to her renal transplant in 2018 at [facility]    --Upon review of her laboratory data via Care Everywhere I

## 2021-07-21 NOTE — PROGRESS NOTES
Pt has omnipod insulin pump attached and a transmitter, which she states she will cont to use and will not remove, also does not want to be stuck for blood sugars as she feels this is redundant, gave RN her settings to tell Dr Rajendra Pepper.     Dr Rajendra Pepper notified o

## 2021-07-21 NOTE — PROGRESS NOTES
Rec'd pt from cath lab, A&Ox4, rt groin perclose site cdi, pedal pulses 1+/doppler strong, instructed to keep rt leg straight, placed in reverse trendelenburg for comfort, reviewed frequent vs, rt groin and pulse checks, pt sts bp can only be taken [employer]

## 2021-07-21 NOTE — PROGRESS NOTES
Cardiology Progress Note    Kayley Weller Patient Status:  Inpatient    1960 MRN [id number]   Location [facility] 2W/SW Attending Juana Kidd, *   Hosp Day # 1 PCP Chrissie Talbert     Doing well this morning, no complaints, fee • High cholesterol    • History of carpal tunnel surgery 2009   • Hx of laminectomy 2009   • HYPERLIPIDEMIA    • HYPERTENSION    • Kidney replaced by transplant     1996, 2018   • Left knee dislocation 1986   • Rhabdomyolysis due to statin therapy 2003 no bruits. Cardiac: Regular rate and rhythm. S1, S2 normal. No murmur, pericardial rub, S3.  Lungs: Clear without wheezes, rales, rhonchi or dullness. Normal excursions and effort. Abdomen: Soft, non-tender. BS-present.   Extremities: Without clubbing, c

## 2021-07-21 NOTE — PROGRESS NOTES
Patient took home medications this morning. Patient states she was told this was \"ok because the medications weren't available from pharmacy yet. \" Patient made aware of risks of taking own medications when unable to be verified by staff.  Patient informed

## 2021-07-21 NOTE — CONSULTS
[address]. NOTE      Patient Name: Daphne Kendrick MRN: [id number]   : 1960 CSN: [id number] also called the patient's cardiologist at [facility] at her request.  The doctor says they haven't seen each other since 2018.   Nevertheless, we reviewed the issues re her AF, our decision making process, and she reassured the pt about the cur Stents (Mid LAD +Distal RCA)   • CARPAL TUNNEL RELEASE Right 2007   • CATARACT EXTRACTION  1993   • CHOLECYSTECTOMY  2011   • EYE SURGERY  1981    vitrectomy L eye   • HAND/FINGER SURGERY UNLISTED      trigger finger relaeases on 3   • HYSTEROSCOPY  2010 Oral Once   • prasugrel  10 mg Oral Daily   • aspirin  81 mg Oral Daily   • tacrolimus  0.5 mg Oral BID   • predniSONE  4 mg Oral Daily   • Pravastatin Sodium  60 mg Oral Nightly   • Mycophenolate Sodium  360 mg Oral Daily   • magnesium oxide  400 mg Oral 07/21/2021    BUN 14 07/21/2021     (L) 07/21/2021    K 4.0 07/21/2021     07/21/2021    CO2 23.0 07/21/2021     (H) 07/21/2021    CA 8.6 07/21/2021    ALB 3.1 (L) 07/21/2021    ALKPHO 72 07/21/2021    BILT 0.8 07/21/2021    TP 5.8 (L) 0

## 2021-07-21 NOTE — CONSULTS
[facility]    Report of Endocrinology Consultation  [facility]    Samuel Fulks Run Patient Status:  Inpatient    1960 MRN [id number]   Location [facility] 2W/SW Attending Franklin Larsen, [facility] Day # • Spondylolysis 2015   • Tendon tear, foot 2015    R foot   • Trigger finger, left 2014    L index finger and ring finger   • Trigger finger, right little finger 2015     Past Surgical History:   Procedure Laterality Date   • BLOOD TRANSFUSIONS     • CAR Annotation: simvastatin  Sulfa Antibiotics       RASH  Diphenoxylate-Atrop*    RASH  Lactose                 DIARRHEA    Medications:    Current Facility-Administered Medications:   •  magnesium oxide (MAG-OX) tab 800 mg, 800 mg, Oral, Once  •  mycophenola negative  Genitourinary:negative  Integument/breast: negative  Hematologic/lymphatic: negative  Musculoskeletal:negative  Neurological: negative  Behavioral/Psych: negative  Endocrine: As in history of present  Allergic/Immunologic: negative    Physical Ex renal transplant  4. Atrial fibrillation  5. Factor V Leiden disease  6. Chronic use of corticosteroid    Plan:    Patient presents with acute coronary syndrome and new A. fib with left bundle. Taken emergently to Cath Lab.   Diffuse disease of RCA with

## 2021-07-21 NOTE — PROGRESS NOTES
2230:  VSS , with afib 90s-115 most of time, no ectopy, repeat k was 4.4, rt groin soft, no bleeding or hematoma, and pedal pulses strong doppler, pt c/o chest pain/dull pressure but not like kind that brought her to hospital, on 2l nc,   Dr Ana Laura Alexandra notified

## 2021-07-21 NOTE — ED PROVIDER NOTES
Patient Seen in: [facility] AND [facility] Emergency Department      History   No chief complaint on file.     Stated Complaint: Left arm pain, chest pain     HPI/Subjective:   HPI    Patient presents to the emergency department complaining of left arm pain and c TRANSPLANTATION OF KIDNEY  1996   • TUBAL LIGATION  1982                Social History    Tobacco Use      Smoking status: Never Smoker      Smokeless tobacco: Never Used    Vaping Use      Vaping Use: Never used    Alcohol use: No    Drug use:  No Neutrophil Absolute Prelim 9.32 (*)     Neutrophil Absolute 9.32 (*)     All other components within normal limits   CBC WITH DIFFERENTIAL WITH PLATELET    Narrative:      The following orders were created for panel order CBC With Differential With Plat Present on Admission  Date Reviewed: 6/1/2021        ICD-10-CM Noted POA    * (Principal) Unstable angina ([facility] [address] I20.0 7/20/2021 Unknown

## 2021-07-21 NOTE — PROGRESS NOTES
07/21/21 1200   Vitals   Temp 98.6 °F (37 °C)   Temp src Temporal   Pulse 93   Heart Rate Source Monitor   Resp 19   /71   MAP (mmHg) 83   BP Location Left leg   BP Method Automatic   Patient Position Lying   Cardiac Rhythm Atrial fibrillation   O

## 2021-07-22 ENCOUNTER — APPOINTMENT (OUTPATIENT)
Dept: CV DIAGNOSTICS | Facility: HOSPITAL | Age: 61
End: 2021-07-22
Attending: NURSE PRACTITIONER
Payer: MEDICARE

## 2021-07-22 ENCOUNTER — APPOINTMENT (OUTPATIENT)
Dept: INTERVENTIONAL RADIOLOGY/VASCULAR | Facility: HOSPITAL | Age: 61
End: 2021-07-22
Attending: NURSE PRACTITIONER
Payer: MEDICARE

## 2021-07-22 LAB
ANION GAP SERPL CALC-SCNC: 5 MMOL/L (ref 0–18)
BASOPHILS # BLD AUTO: 0.01 X10(3) UL (ref 0–0.2)
BASOPHILS NFR BLD AUTO: 0.1 %
BUN BLD-MCNC: 16 MG/DL (ref 7–18)
BUN/CREAT SERPL: 16.5 (ref 10–20)
CALCIUM BLD-MCNC: 8.6 MG/DL (ref 8.5–10.1)
CHLORIDE SERPL-SCNC: 100 MMOL/L (ref 98–112)
CO2 SERPL-SCNC: 26 MMOL/L (ref 21–32)
CREAT BLD-MCNC: 0.97 MG/DL
DEPRECATED RDW RBC AUTO: 40.4 FL (ref 35.1–46.3)
EOSINOPHIL # BLD AUTO: 0.11 X10(3) UL (ref 0–0.7)
EOSINOPHIL NFR BLD AUTO: 1.2 %
ERYTHROCYTE [DISTWIDTH] IN BLOOD BY AUTOMATED COUNT: 13.1 % (ref 11–15)
GLUCOSE BLD-MCNC: 207 MG/DL (ref 70–99)
GLUCOSE BLDC GLUCOMTR-MCNC: 160 MG/DL (ref 70–99)
GLUCOSE BLDC GLUCOMTR-MCNC: 192 MG/DL (ref 70–99)
GLUCOSE BLDC GLUCOMTR-MCNC: 253 MG/DL (ref 70–99)
HAV IGM SER QL: 1.4 MG/DL (ref 1.6–2.6)
HCT VFR BLD AUTO: 35.7 %
HGB BLD-MCNC: 11.7 G/DL
IMM GRANULOCYTES # BLD AUTO: 0.04 X10(3) UL (ref 0–1)
IMM GRANULOCYTES NFR BLD: 0.4 %
LYMPHOCYTES # BLD AUTO: 2.16 X10(3) UL (ref 1–4)
LYMPHOCYTES NFR BLD AUTO: 23.5 %
MCH RBC QN AUTO: 28.3 PG (ref 26–34)
MCHC RBC AUTO-ENTMCNC: 32.8 G/DL (ref 31–37)
MCV RBC AUTO: 86.2 FL
MONOCYTES # BLD AUTO: 0.7 X10(3) UL (ref 0.1–1)
MONOCYTES NFR BLD AUTO: 7.6 %
NEUTROPHILS # BLD AUTO: 6.18 X10 (3) UL (ref 1.5–7.7)
NEUTROPHILS # BLD AUTO: 6.18 X10(3) UL (ref 1.5–7.7)
NEUTROPHILS NFR BLD AUTO: 67.2 %
OSMOLALITY SERPL CALC.SUM OF ELEC: 279 MOSM/KG (ref 275–295)
PLATELET # BLD AUTO: 208 10(3)UL (ref 150–450)
POTASSIUM SERPL-SCNC: 5 MMOL/L (ref 3.5–5.1)
RBC # BLD AUTO: 4.14 X10(6)UL
SODIUM SERPL-SCNC: 131 MMOL/L (ref 136–145)
WBC # BLD AUTO: 9.2 X10(3) UL (ref 4–11)

## 2021-07-22 PROCEDURE — 99213 OFFICE O/P EST LOW 20 MIN: CPT | Performed by: INTERNAL MEDICINE

## 2021-07-22 PROCEDURE — 5A2204Z RESTORATION OF CARDIAC RHYTHM, SINGLE: ICD-10-PCS | Performed by: INTERNAL MEDICINE

## 2021-07-22 PROCEDURE — B246ZZ4 ULTRASONOGRAPHY OF RIGHT AND LEFT HEART, TRANSESOPHAGEAL: ICD-10-PCS | Performed by: INTERNAL MEDICINE

## 2021-07-22 PROCEDURE — 93320 DOPPLER ECHO COMPLETE: CPT | Performed by: NURSE PRACTITIONER

## 2021-07-22 PROCEDURE — 93325 DOPPLER ECHO COLOR FLOW MAPG: CPT | Performed by: NURSE PRACTITIONER

## 2021-07-22 RX ORDER — ONDANSETRON 2 MG/ML
INJECTION INTRAMUSCULAR; INTRAVENOUS
Status: COMPLETED
Start: 2021-07-22 | End: 2021-07-22

## 2021-07-22 RX ORDER — LIDOCAINE HYDROCHLORIDE 20 MG/ML
SOLUTION OROPHARYNGEAL
Status: COMPLETED
Start: 2021-07-22 | End: 2021-07-22

## 2021-07-22 RX ORDER — MAGNESIUM OXIDE 400 MG (241.3 MG MAGNESIUM) TABLET
400 TABLET ONCE
Status: COMPLETED | OUTPATIENT
Start: 2021-07-22 | End: 2021-07-22

## 2021-07-22 RX ORDER — MIDAZOLAM HYDROCHLORIDE 1 MG/ML
INJECTION INTRAMUSCULAR; INTRAVENOUS
Status: COMPLETED
Start: 2021-07-22 | End: 2021-07-22

## 2021-07-22 RX ORDER — MIDAZOLAM HYDROCHLORIDE 1 MG/ML
2 INJECTION INTRAMUSCULAR; INTRAVENOUS ONCE
Status: COMPLETED | OUTPATIENT
Start: 2021-07-22 | End: 2021-07-22

## 2021-07-22 RX ORDER — LIDOCAINE HYDROCHLORIDE 20 MG/ML
10 SOLUTION OROPHARYNGEAL ONCE
Status: COMPLETED | OUTPATIENT
Start: 2021-07-22 | End: 2021-07-22

## 2021-07-22 RX ORDER — ONDANSETRON 2 MG/ML
4 INJECTION INTRAMUSCULAR; INTRAVENOUS ONCE
Status: COMPLETED | OUTPATIENT
Start: 2021-07-22 | End: 2021-07-22

## 2021-07-22 NOTE — PROGRESS NOTES
Patient given doses of Myfortic, pravastatin, prograf. She refuses to take protonix and took her home dose of lansoprasole along with Bisoprolol.

## 2021-07-22 NOTE — PLAN OF CARE
CCU tx. RA. Afebrile. R groin cath site, c/d/I there is some bruising around site but it is soft to touch and patient denies any patient. Patient is cooperative and open at this time. Complains of dizziness and nausea from movement from CCU room.  Will cont INTERVENTIONS:  - Maintain adequate hydration with IV or PO as ordered and tolerated  - Nasogastric tube to low intermittent suction as ordered  - Evaluate effectiveness of ordered antiemetic medications  - Provide nonpharmacologic comfort measures as appr prevention bundle as indicated  Outcome: Progressing

## 2021-07-22 NOTE — IVS NOTE
Procedure hand off report given to [facility]. Pt's vital signs are stable. No c/o pain at this time. Patient c/o nausea - see MAR Dr. Viki Apley spoke with patient/family post procedure.

## 2021-07-22 NOTE — PROGRESS NOTES
Patient states her blood sugar is 169. She gave herself 15 carbs and 0.8 units of novolog via insulin pump.

## 2021-07-22 NOTE — PROGRESS NOTES
[facility] Hospitalist Progress Note     PCP: DELLA LEA    CC:  Follow up    SUBJECTIVE:  Events reviewed. Pt had CECILE, DCCV today- feels spinning sensation and nausea. Has hx motion sickness.     OBJECTIVE:  Temp:  [97 °F (36.1 °C)-98.4 °F (36.9 °C)] 98 ° BID   • predniSONE  4 mg Oral Daily   • Pravastatin Sodium  60 mg Oral Nightly   • Mycophenolate Sodium  360 mg Oral Daily   • magnesium oxide  400 mg Oral Daily   • ezetimibe  10 mg Oral Daily   • Insulin via Insulin Pump   Subcutaneous TID AC and HS

## 2021-07-22 NOTE — PLAN OF CARE
Problem: Diabetes/Glucose Control  Goal: Glucose maintained within prescribed range  Description: INTERVENTIONS:  - Monitor Blood Glucose as ordered  - Assess for signs and symptoms of hyperglycemia and hypoglycemia  - Administer ordered medications to m tolerated  - Nasogastric tube to low intermittent suction as ordered  - Evaluate effectiveness of ordered antiemetic medications  - Provide nonpharmacologic comfort measures as appropriate  - Advance diet as tolerated, if ordered  - Obtain nutritional cons Assess and document dressing/incision, wound bed, drain sites and surrounding tissue  - Implement wound care per orders  - Initiate isolation precautions as appropriate  - Initiate Pressure Ulcer prevention bundle as indicated  7/22/2021 0407 by Jackie Walker

## 2021-07-22 NOTE — PROCEDURES
PROCEDURE(S) PERFORMED:    1. Cardioversion. 2.     Sedation     :  Roberto Carlos Eckert MD    ANESTHESIA:  IV sedation.   Moderate conscious sedation for this procedure was administered and personally monitored from 9 AM until 9:15 AM.     INDICATION

## 2021-07-22 NOTE — PROGRESS NOTES
This RN attempted to given evening meds, patient refusing medications provided by hospital. She states, \" I do my own thing, so no\".  I asked patient if we can take her blood sugar via finger stick and she is refusing, this RN asked patient if she can upd

## 2021-07-22 NOTE — PROGRESS NOTES
[facility]    Endocrine Progress Note  [facility]    Samuel Santos Patient Status:  Observation    1960 MRN [id number]   Location [facility] 2W/SW Attending Franklin Larsen [facility] Day # 0 PCP Lucita Gordon Nightly  •  mycophenolate (MYFORTIC) EC tab, 360 mg, Oral, Daily  •  magnesium oxide (MAG-OX) tab 400 mg, 400 mg, Oral, Daily  •  LORazepam (ATIVAN) tab 0.25 mg, 0.25 mg, Oral, PRN  •  ezetimibe (ZETIA) tab 10 mg, 10 mg, Oral, Daily  •  glucose (DEX4) oral 12/06/2019       XR CHEST AP PORTABLE  (CPT=71045)    Result Date: 7/20/2021  CONCLUSION:  1. Stable linear opacities in the left midlung, which likely relate to atelectasis/scarring. No additional focal airspace disease or significant pleural effusion.  2 MD  7/22/2021

## 2021-07-22 NOTE — PLAN OF CARE
Patient verbally abusive to staff, calling them \"stupid and incompitent\"  Refuses any offer of assistant, stating \"you bug me, go away\"

## 2021-07-22 NOTE — CARDIAC REHAB
Cardiac Rehab Phase I    Activity:  Distance   Assistance needed   Patient tolerated activity . Education:  Handouts provided and reviewed: [address] Diet: Healthy Cardiac diet reviewed.     Disease Process: Disease process rev

## 2021-07-22 NOTE — PROGRESS NOTES
[facility]OSP - [facility]  Hematology   Progress Note    Arpita Patrick Patient Status:  Observation    1960 MRN [id number]   Location [facility] 3W/SW Attending Low Carr, *   Hosp Day # 0 PCP Kevin Combs     Patient wit 0.8 07/21/2021    TP 5.8 (L) 07/21/2021    AST 22 07/21/2021    ALT 32 07/21/2021    PTT 28.7 07/20/2021    INR 1.07 07/20/2021    TSH 1.280 06/06/2019    ESRML 14 06/06/2019    CRP <0.29 12/06/2019    MG 1.4 (L) 07/22/2021    PHOS 3.3 08/13/2020    TROP 0 -Seen with left ventricular hypertrophy (strain) -consider ischemia consider Lateral ischemia. ABNORMAL When compared with ECG of 12/11/2012 07:38:07 tachycardia and ST depression are now present Electronically signed on 07/21/2021 at 11:45 by [address], M. time leading to the slightly raised level.      --Similarly, pt was also noted to have a lowered Factor 7 level on that same day.  Factor 7 is vitamin K dependent and can be lowered quickly in sepsis or nutritionally deplete states, so it is unlikely that s

## 2021-07-23 VITALS
WEIGHT: 142.19 LBS | OXYGEN SATURATION: 97 % | HEIGHT: 64 IN | BODY MASS INDEX: 24.28 KG/M2 | TEMPERATURE: 99 F | HEART RATE: 55 BPM | SYSTOLIC BLOOD PRESSURE: 92 MMHG | DIASTOLIC BLOOD PRESSURE: 62 MMHG | RESPIRATION RATE: 17 BRPM

## 2021-07-23 LAB
ANION GAP SERPL CALC-SCNC: 6 MMOL/L (ref 0–18)
BUN BLD-MCNC: 19 MG/DL (ref 7–18)
BUN/CREAT SERPL: 22.1 (ref 10–20)
CALCIUM BLD-MCNC: 8.5 MG/DL (ref 8.5–10.1)
CHLORIDE SERPL-SCNC: 103 MMOL/L (ref 98–112)
CO2 SERPL-SCNC: 25 MMOL/L (ref 21–32)
CREAT BLD-MCNC: 0.86 MG/DL
GLUCOSE BLD-MCNC: 105 MG/DL (ref 70–99)
HAV IGM SER QL: 1.6 MG/DL (ref 1.6–2.6)
OSMOLALITY SERPL CALC.SUM OF ELEC: 281 MOSM/KG (ref 275–295)
POTASSIUM SERPL-SCNC: 4.8 MMOL/L (ref 3.5–5.1)
SODIUM SERPL-SCNC: 134 MMOL/L (ref 136–145)

## 2021-07-23 RX ORDER — WARFARIN SODIUM 5 MG/1
5 TABLET ORAL NIGHTLY
Qty: 30 TABLET | Refills: 2 | Status: SHIPPED | OUTPATIENT
Start: 2021-07-23 | End: 2021-11-14

## 2021-07-23 RX ORDER — BISOPROLOL FUMARATE 5 MG/1
2.5 TABLET ORAL 2 TIMES DAILY
COMMUNITY

## 2021-07-23 RX ORDER — MAGNESIUM OXIDE 400 MG (241.3 MG MAGNESIUM) TABLET
400 TABLET ONCE
Status: COMPLETED | OUTPATIENT
Start: 2021-07-23 | End: 2021-07-23

## 2021-07-23 RX ORDER — ACYCLOVIR 400 MG/1
400 TABLET ORAL SEE ADMIN INSTRUCTIONS
COMMUNITY

## 2021-07-23 RX ORDER — WARFARIN SODIUM 5 MG/1
5 TABLET ORAL NIGHTLY
Status: DISCONTINUED | OUTPATIENT
Start: 2021-07-23 | End: 2021-07-23

## 2021-07-23 RX ORDER — ENOXAPARIN SODIUM 100 MG/ML
1.5 INJECTION SUBCUTANEOUS DAILY
Qty: 14 EACH | Refills: 1 | Status: SHIPPED | OUTPATIENT
Start: 2021-07-24 | End: 2021-11-14

## 2021-07-23 RX ORDER — ENOXAPARIN SODIUM 100 MG/ML
1.5 INJECTION SUBCUTANEOUS DAILY
Status: DISCONTINUED | OUTPATIENT
Start: 2021-07-23 | End: 2021-07-23

## 2021-07-23 NOTE — DISCHARGE SUMMARY
General Medicine Discharge Summary     Patient ID:  Rohith Trotter  59year old  [id number]  2/6/1960    Admit date: 7/20/2021    Discharge date and time:  7/23/21    Attending Physician: Marshal Zabala, *     Primary Care Physician: Shaquille Cuadra CONSULT TO HOSPITALIST  IP CONSULT TO ENDOCRINOLOGY  IP CONSULT TO CARDIOLOGY  IP CONSULT TO HEMATOLOGY  IP CONSULT TO CARDIAC REHAB  IP CONSULT TO FOOD AND NUTRITION SERVICES  IP CONSULT TO NEPHROLOGY  [facility] CONSULT TO DIABETES EDUCATION  IP CONSULT TO CARD -------------------------------------------------------------------      Transesophageal Echocardiography 2D, complete spectral Doppler, and color Doppler ------------------------------------------------------------------- Daniel Stubbs procedure were explained to the patient and informed consent was obtained. Procedure:  Initial setup. The patient was brought to the laboratory in the fasting state. Intravenous access was obtained.  Surface ECG leads and pulse oximetric signals were monit (&quot;smoke&quot;) in the appendage. The appendage was well visualized. Emptying velocity was moderately reduced. Right ventricle: The cavity size was normal. Systolic function was normal. Pulmonic valve:    Structurally normal valve.    Cusp separation w ------------------------------------------------------------------- Study Conclusions 1. Left ventricle: The cavity size was normal. Wall thickness was    increased in a pattern of mild LVH.  Systolic function was    normal. The estimated ejection fraction ejection fraction was 65%, by visual assessment. Wall motion was normal; there were no regional wall motion abnormalities. The study was not technically sufficient to allow evaluation of LV diastolic dysfunction due to atrial fibrillation.  Aortic valve: The vessel was normal in size.  The respirophasic diameter changes were in the normal range (= 50%), consistent with normal central venous pressure. ------------------------------------------------------------------- Measurements  Left ventricle ---------  Aortic valve area/bsa, peak               0.58  cm^2/m^2 ---------  velocity  Velocity ratio, mean, LVOT/AV             0.41           ---------   Aorta                                     Value          Reference  Aortic root ID monitored from 9 AM until 9:15 AM.  INDICATION:  Persistent atrial fibrillation. COMPLICATIONS:  None. METHODS:  The patient was brought to the outpatient cardiac telemetry unit in a fasting and nonsedated state after providing informed consent.   IV nura supplies multiple diagonals which are non-obstructive Circumflex:  Free of obstructive disease, supplies large OM branch to the inferolateral wall which is diffusely diseased. Focal stenosis, 80% in the mid to distal segment.   Runoff is diffusely diseased groin for local anesthesia and a 6 Mongolian introducer sheath was inserted into the right femoral artery. Selective coronary angiography performed with JR4 catheter for RCA and JL4 catheter for LCA. Angiography performed in standard projections.   6 Tristanian MG Oral Tab  Take 400 mg by mouth daily. Pravastatin Sodium (PRAVACHOL) 20 MG Oral Tab  Take 60 mg by mouth nightly. Prasugrel HCl 10 MG Oral Tab  Take 10 mg by mouth daily. ezetimibe 10 MG Oral Tab  Take 10 mg by mouth daily.       lansoprazole

## 2021-07-23 NOTE — PROGRESS NOTES
Cardiology Progress Note    Sherrell Barefoot Patient Status:  Inpatient    1960 MRN [id number]   Location [facility] 2W/SW Attending rAtur Navas, *   Hosp Day # 0 PCP Juju Fernando     Doing well this morning, no complaints, fee Rhabdomyolysis due to statin therapy 2003   • Spondylolysis 2015   • Tendon tear, foot 2015    R foot   • Trigger finger, left 2014    L index finger and ring finger   • Trigger finger, right little finger 2015     Past Surgical History:   Procedure Edi Chand BS-present. Extremities: Without clubbing, cyanosis or edema. Peripheral pulses are 2+. Neurologic: Non-focal  Skin: Warm and dry.        Christi Bautista MD  7/21/2021  10:05 AM

## 2021-07-23 NOTE — PROGRESS NOTES
S: Patient to be discharged home to self-administer Lovenox (Enoxaparin) subcutaneously daily. B: Patient has prior history of administering subcutaneous medications.   A: Assessed patient's understanding of administering medications via subcutaneous route

## 2021-07-23 NOTE — PROGRESS NOTES
[facility] Hospitalist Progress Note     PCP: DELLA LEA    CC:  Follow up    SUBJECTIVE:  Sitting up, no current cp/sob/n/v/f/c. Feels fine. No dizziness today.  Awaiting [facility] team    OBJECTIVE:  Temp:  [98 °F (36.7 °C)-98.7 °F (37.1 °C)] 98.7 °F (37.1 ° • Pantoprazole Sodium  40 mg Oral BID AC   • prasugrel  10 mg Oral Daily   • aspirin  81 mg Oral Daily   • tacrolimus  0.5 mg Oral BID   • predniSONE  4 mg Oral Daily   • Pravastatin Sodium  60 mg Oral Nightly   • Mycophenolate Sodium  360 mg Oral Daily Octavia Aguilar MD    [facility] Hospitalist  Answering Service number: [phone]

## 2021-07-23 NOTE — DISCHARGE PLANNING
I called the pharmacy below to inquire about the copay of Enoxaparin/Lovenox before patient discharges. [facility] in [facility] [place]#[phone]    The patient's copay is $6 for 14 syringes.      Danita Guerra, Discharge Leader

## 2021-07-23 NOTE — DISCHARGE PLANNING
Patient was provided with discharge instructions, education, and follow up information. Printed prescriptions were called in to patient's pharmacy (see previous note).  Patient verbalizes understanding of follow up information, specifically medication presc

## 2021-07-23 NOTE — PLAN OF CARE
Pt A&Ox4, room air. Pt refused to take any hospital medications and took her home meds. Pt also refused to get her glucose levels checked.  at bedside. Fall precautions in place, call light with reach.       Problem: Diabetes/Glucose Control  Goal: G ordered  - Evaluate effectiveness of ordered antiemetic medications  - Provide nonpharmacologic comfort measures as appropriate  - Advance diet as tolerated, if ordered  - Obtain nutritional consult as needed  - Evaluate fluid balance  Outcome: Progressing

## 2021-07-23 NOTE — CARDIAC REHAB
Cardiac Rehab Phase I    Activity:  Distance   Assistance needed   Patient tolerated activity . Education:  Handouts provided and reviewed: [address] Diet: Healthy Cardiac diet reviewed.     Disease Process: Disease process rev

## 2021-07-23 NOTE — CONSULTS
[facility]    Report of Consultation    Amber Licea Patient Status:  Observation    1960 MRN [id number]   Location [facility] 3W/SW Attending Renetta Griffin, *   Hosp Day # 0 PCP Vaughn Manning     Date of Admissi Spondylolysis 2015   • Tendon tear, foot 2015    R foot   • Trigger finger, left 2014    L index finger and ring finger   • Trigger finger, right little finger 2015       Past Surgical History  Past Surgical History:   Procedure Laterality Date   • BLOOD T tab 4 mg, 4 mg, Oral, Daily  Pravastatin Sodium (PRAVACHOL) tab 60 mg, 60 mg, Oral, Nightly  mycophenolate (MYFORTIC) EC tab, 360 mg, Oral, Daily  magnesium oxide (MAG-OX) tab 400 mg, 400 mg, Oral, Daily  LORazepam (ATIVAN) tab 0.25 mg, 0.25 mg, Oral, PRN Cholecalciferol (VITAMIN D-3 OR), Take 5,000 Units by mouth. [DISCONTINUED] CALCIUM OR, Take 1 tablet by mouth.   FREESTYLE TEST In Vitro Strip, TEST 4 TO 6 TIMES D  insulin aspart 100 UNIT/ML Subcutaneous Solution, Inject into the skin 3 (three) times (64.5 kg), SpO2 97 %. Intake/Output:   Last 3 shifts: I/O last 3 completed shifts: In: 350 [P.O.:300; I.V.:50]  Out: 952 [Urine:952]   This shift: No intake/output data recorded.      Vent Settings:      Hemodynamic parameters (last 24 hours):      [place] 07/23/2021    CO2 25.0 07/23/2021     (H) 07/23/2021    CA 8.5 07/23/2021    ALB 3.1 (L) 07/21/2021    ALKPHO 72 07/21/2021    TP 5.8 (L) 07/21/2021    AST 22 07/21/2021    ALT 32 07/21/2021    PTT 28.7 07/20/2021    INR 1.07 07/20/2021    PTP 13.7

## 2021-07-24 NOTE — PLAN OF CARE
Problem: Diabetes/Glucose Control  Goal: Glucose maintained within prescribed range  Description: INTERVENTIONS:  - Monitor Blood Glucose as ordered  - Assess for signs and symptoms of hyperglycemia and hypoglycemia  - Administer ordered medications to m nutritional consult as needed  - Evaluate fluid balance  Outcome: Adequate for Discharge     Problem: METABOLIC/FLUID AND ELECTROLYTES - ADULT  Goal: Electrolytes maintained within normal limits  Description: INTERVENTIONS:  - Monitor labs and rhythm and a diabetic since 8years old  - Provide timely, complete, and accurate information to patient/family  - Incorporate patient and family knowledge, values, beliefs, and cultural backgrounds into the planning and delivery of care  - Encourage patient/family to

## 2021-07-25 LAB — FACTOR VII ACTIVITY: 64 %

## 2021-07-26 ENCOUNTER — LAB ENCOUNTER (OUTPATIENT)
Dept: LAB | Age: 61
End: 2021-07-26
Attending: INTERNAL MEDICINE
Payer: MEDICARE

## 2021-07-26 DIAGNOSIS — Z94.0 TRANSPLANTED KIDNEY: ICD-10-CM

## 2021-07-26 DIAGNOSIS — Z79.899 ENCOUNTER FOR LONG-TERM (CURRENT) USE OF OTHER MEDICATIONS: ICD-10-CM

## 2021-07-26 DIAGNOSIS — D84.9 IMMUNOSUPPRESSED STATUS (HCC): ICD-10-CM

## 2021-07-26 DIAGNOSIS — D84.9 IMMUNODEFICIENCY, UNSPECIFIED (HCC): ICD-10-CM

## 2021-07-26 DIAGNOSIS — Z79.899 HIGH RISK MEDICATION USE: ICD-10-CM

## 2021-07-26 DIAGNOSIS — Z94.0 KIDNEY REPLACED BY TRANSPLANT: ICD-10-CM

## 2021-07-26 DIAGNOSIS — I48.91 ATRIAL FIBRILLATION, NEW ONSET (HCC): ICD-10-CM

## 2021-07-26 LAB
BASOPHILS # BLD AUTO: 0.01 X10(3) UL (ref 0–0.2)
BASOPHILS NFR BLD AUTO: 0.1 %
CREAT BLD-MCNC: 0.77 MG/DL
DEPRECATED RDW RBC AUTO: 43 FL (ref 35.1–46.3)
EOSINOPHIL # BLD AUTO: 0.1 X10(3) UL (ref 0–0.7)
EOSINOPHIL NFR BLD AUTO: 1.5 %
ERYTHROCYTE [DISTWIDTH] IN BLOOD BY AUTOMATED COUNT: 13.3 % (ref 11–15)
GLUCOSE BLD-MCNC: 126 MG/DL (ref 70–99)
HCT VFR BLD AUTO: 38.2 %
HGB BLD-MCNC: 12.1 G/DL
IMM GRANULOCYTES # BLD AUTO: 0.03 X10(3) UL (ref 0–1)
IMM GRANULOCYTES NFR BLD: 0.4 %
INR BLD: 2.66 (ref 0.9–1.2)
LYMPHOCYTES # BLD AUTO: 1.63 X10(3) UL (ref 1–4)
LYMPHOCYTES NFR BLD AUTO: 24.4 %
MCH RBC QN AUTO: 28.2 PG (ref 26–34)
MCHC RBC AUTO-ENTMCNC: 31.7 G/DL (ref 31–37)
MCV RBC AUTO: 89 FL
MONOCYTES # BLD AUTO: 0.69 X10(3) UL (ref 0.1–1)
MONOCYTES NFR BLD AUTO: 10.3 %
NEUTROPHILS # BLD AUTO: 4.23 X10 (3) UL (ref 1.5–7.7)
NEUTROPHILS # BLD AUTO: 4.23 X10(3) UL (ref 1.5–7.7)
NEUTROPHILS NFR BLD AUTO: 63.3 %
PLATELET # BLD AUTO: 239 10(3)UL (ref 150–450)
POTASSIUM SERPL-SCNC: 4.6 MMOL/L (ref 3.5–5.1)
PROTHROMBIN TIME: 27.9 SECONDS (ref 11.8–14.5)
RBC # BLD AUTO: 4.29 X10(6)UL
WBC # BLD AUTO: 6.7 X10(3) UL (ref 4–11)

## 2021-07-26 PROCEDURE — 85025 COMPLETE CBC W/AUTO DIFF WBC: CPT

## 2021-07-26 PROCEDURE — 84132 ASSAY OF SERUM POTASSIUM: CPT

## 2021-07-26 PROCEDURE — 36415 COLL VENOUS BLD VENIPUNCTURE: CPT

## 2021-07-26 PROCEDURE — 82565 ASSAY OF CREATININE: CPT

## 2021-07-26 PROCEDURE — 85610 PROTHROMBIN TIME: CPT

## 2021-07-26 PROCEDURE — 82947 ASSAY GLUCOSE BLOOD QUANT: CPT

## 2021-07-27 ENCOUNTER — TELEPHONE (OUTPATIENT)
Dept: HEMATOLOGY/ONCOLOGY | Facility: HOSPITAL | Age: 61
End: 2021-07-27

## 2021-07-27 NOTE — TELEPHONE ENCOUNTER
I called Jared Crandall regarding recent inpatient lab tests regarding factor VII levels. Left message for her to call me back. Her lab value is not clinically concerning for underlying bleeding disorder.     Bernarda Hameed MD  [facility]

## 2021-07-28 ENCOUNTER — TELEPHONE (OUTPATIENT)
Dept: HEMATOLOGY/ONCOLOGY | Facility: HOSPITAL | Age: 61
End: 2021-07-28

## 2021-07-28 NOTE — TELEPHONE ENCOUNTER
Called the patient back regarding her factor VII levels as her value was at 64% (lower limit of normal is 80%). I reviewed with her that at that value she is unlikely to have clinical bleeding complications. Her prior value was 16% in 2011.  We reviewed jayna

## 2021-08-02 ENCOUNTER — HOSPITAL ENCOUNTER (EMERGENCY)
Facility: HOSPITAL | Age: 61
Discharge: HOME | End: 2021-08-02
Attending: EMERGENCY MEDICINE
Payer: COMMERCIAL

## 2021-08-02 ENCOUNTER — LAB ENCOUNTER (OUTPATIENT)
Dept: LAB | Age: 61
End: 2021-08-02
Attending: INTERNAL MEDICINE
Payer: COMMERCIAL

## 2021-08-02 VITALS
RESPIRATION RATE: 22 BRPM | DIASTOLIC BLOOD PRESSURE: 101 MMHG | OXYGEN SATURATION: 98 % | TEMPERATURE: 97 F | SYSTOLIC BLOOD PRESSURE: 114 MMHG | WEIGHT: 160 LBS | HEART RATE: 58 BPM | HEIGHT: 63 IN | BODY MASS INDEX: 28.35 KG/M2

## 2021-08-02 DIAGNOSIS — R79.1 SUPRATHERAPEUTIC INR: Primary | ICD-10-CM

## 2021-08-02 DIAGNOSIS — I48.91 ATRIAL FIBRILLATION, NEW ONSET (HCC): ICD-10-CM

## 2021-08-02 LAB
INR BLD: 10.93 (ref 0.9–1.2)
PROTHROMBIN TIME: 84.2 SECONDS (ref 11.8–14.5)

## 2021-08-02 PROCEDURE — 85610 PROTHROMBIN TIME: CPT

## 2021-08-02 PROCEDURE — 36415 COLL VENOUS BLD VENIPUNCTURE: CPT

## 2021-08-02 PROCEDURE — 99283 EMERGENCY DEPT VISIT LOW MDM: CPT

## 2021-08-02 NOTE — ED PROVIDER NOTES
Patient Seen in: [facility] AND [facility] Emergency Department      History   Patient presents with:  Abnormal Labs    Stated Complaint: Abnormal lab.  INR    HPI/Subjective:   HPI    15-year-old female with history of A. fib on Coumadin presents for evaluation Smoker      Smokeless tobacco: Never Used    Vaping Use      Vaping Use: Never used    Alcohol use: No    Drug use: No             Review of Systems    Positive for stated complaint: Abnormal lab. INR  Other systems are as noted in HPI.   Constitutional and Disposition:  Discharge  8/2/2021  4:56 pm    Follow-up:  No follow-up provider specified.   We recommend that you schedule follow up care with a primary care provider within the next three months to obtain basic health screening including reassessment

## 2021-08-02 NOTE — ED QUICK NOTES
Patient cleared for discharge by MD. Enriquez with patient. Patient discharge instructions reviewed with patient including when and how to follow up  with healthcare provider and when to seek medical treatment.

## 2021-08-03 ENCOUNTER — LAB ENCOUNTER (OUTPATIENT)
Dept: LAB | Age: 61
End: 2021-08-03
Attending: INTERNAL MEDICINE
Payer: COMMERCIAL

## 2021-08-03 DIAGNOSIS — I48.91 ATRIAL FIBRILLATION, NEW ONSET (HCC): ICD-10-CM

## 2021-08-03 LAB
INR BLD: 4.51 (ref 0.9–1.2)
PROTHROMBIN TIME: 42.2 SECONDS (ref 11.8–14.5)

## 2021-08-03 PROCEDURE — 85610 PROTHROMBIN TIME: CPT

## 2021-08-03 PROCEDURE — 36415 COLL VENOUS BLD VENIPUNCTURE: CPT

## 2021-08-04 ENCOUNTER — LAB ENCOUNTER (OUTPATIENT)
Dept: LAB | Age: 61
End: 2021-08-04
Attending: INTERNAL MEDICINE
Payer: COMMERCIAL

## 2021-08-04 DIAGNOSIS — I48.91 ATRIAL FIBRILLATION, NEW ONSET (HCC): ICD-10-CM

## 2021-08-04 LAB
INR BLD: 2.9 (ref 0.9–1.2)
PROTHROMBIN TIME: 29.9 SECONDS (ref 11.8–14.5)

## 2021-08-04 PROCEDURE — 85610 PROTHROMBIN TIME: CPT

## 2021-08-04 PROCEDURE — 36415 COLL VENOUS BLD VENIPUNCTURE: CPT

## 2021-08-05 ENCOUNTER — LAB ENCOUNTER (OUTPATIENT)
Dept: LAB | Age: 61
End: 2021-08-05
Attending: DERMATOLOGY
Payer: COMMERCIAL

## 2021-08-05 DIAGNOSIS — I48.91 ATRIAL FIBRILLATION, NEW ONSET (HCC): ICD-10-CM

## 2021-08-05 LAB
INR BLD: 3.42 (ref 0.9–1.2)
PROTHROMBIN TIME: 34 SECONDS (ref 11.8–14.5)

## 2021-08-05 PROCEDURE — 36415 COLL VENOUS BLD VENIPUNCTURE: CPT

## 2021-08-05 PROCEDURE — 85610 PROTHROMBIN TIME: CPT

## 2021-08-06 ENCOUNTER — LAB ENCOUNTER (OUTPATIENT)
Dept: LAB | Facility: HOSPITAL | Age: 61
End: 2021-08-06
Attending: INTERNAL MEDICINE
Payer: COMMERCIAL

## 2021-08-06 DIAGNOSIS — I48.91 ATRIAL FIBRILLATION, NEW ONSET (HCC): ICD-10-CM

## 2021-08-06 LAB
INR BLD: 3.67 (ref 0.9–1.2)
PROTHROMBIN TIME: 35.9 SECONDS (ref 11.8–14.5)

## 2021-08-06 PROCEDURE — 85610 PROTHROMBIN TIME: CPT

## 2021-08-06 PROCEDURE — 36415 COLL VENOUS BLD VENIPUNCTURE: CPT

## 2021-08-07 ENCOUNTER — LAB ENCOUNTER (OUTPATIENT)
Dept: LAB | Age: 61
End: 2021-08-07
Attending: INTERNAL MEDICINE
Payer: COMMERCIAL

## 2021-08-07 DIAGNOSIS — I48.91 ATRIAL FIBRILLATION, NEW ONSET (HCC): ICD-10-CM

## 2021-08-07 LAB
INR BLD: 3.24 (ref 0.9–1.2)
PROTHROMBIN TIME: 32.6 SECONDS (ref 11.8–14.5)

## 2021-08-07 PROCEDURE — 85610 PROTHROMBIN TIME: CPT

## 2021-08-07 PROCEDURE — 36415 COLL VENOUS BLD VENIPUNCTURE: CPT

## 2021-08-09 ENCOUNTER — LAB ENCOUNTER (OUTPATIENT)
Dept: LAB | Age: 61
End: 2021-08-09
Attending: INTERNAL MEDICINE
Payer: COMMERCIAL

## 2021-08-09 DIAGNOSIS — I48.91 ATRIAL FIBRILLATION, NEW ONSET (HCC): ICD-10-CM

## 2021-08-09 LAB
INR BLD: 2.35 (ref 0.9–1.2)
PROTHROMBIN TIME: 25.3 SECONDS (ref 11.8–14.5)

## 2021-08-09 PROCEDURE — 36415 COLL VENOUS BLD VENIPUNCTURE: CPT

## 2021-08-09 PROCEDURE — 85610 PROTHROMBIN TIME: CPT

## 2021-08-10 ENCOUNTER — LAB ENCOUNTER (OUTPATIENT)
Dept: LAB | Age: 61
End: 2021-08-10
Attending: INTERNAL MEDICINE
Payer: COMMERCIAL

## 2021-08-10 DIAGNOSIS — I48.91 ATRIAL FIBRILLATION, NEW ONSET (HCC): ICD-10-CM

## 2021-08-10 LAB
INR BLD: 2.04 (ref 0.9–1.2)
PROTHROMBIN TIME: 22.7 SECONDS (ref 11.8–14.5)

## 2021-08-10 PROCEDURE — 36415 COLL VENOUS BLD VENIPUNCTURE: CPT

## 2021-08-10 PROCEDURE — 85610 PROTHROMBIN TIME: CPT

## 2021-09-01 ENCOUNTER — LAB ENCOUNTER (OUTPATIENT)
Dept: LAB | Age: 61
End: 2021-09-01
Attending: INTERNAL MEDICINE
Payer: COMMERCIAL

## 2021-09-01 DIAGNOSIS — D84.9 IMMUNODEFICIENCY, UNSPECIFIED (HCC): ICD-10-CM

## 2021-09-01 DIAGNOSIS — Z79.899 HIGH RISK MEDICATION USE: ICD-10-CM

## 2021-09-01 DIAGNOSIS — Z94.0 TRANSPLANTED KIDNEY: ICD-10-CM

## 2021-09-01 DIAGNOSIS — Z94.0 KIDNEY REPLACED BY TRANSPLANT: ICD-10-CM

## 2021-09-01 DIAGNOSIS — D84.9 IMMUNOSUPPRESSED STATUS (HCC): ICD-10-CM

## 2021-09-01 DIAGNOSIS — Z79.899 ENCOUNTER FOR LONG-TERM (CURRENT) USE OF OTHER MEDICATIONS: ICD-10-CM

## 2021-09-01 LAB
BASOPHILS # BLD AUTO: 0.02 X10(3) UL (ref 0–0.2)
BASOPHILS NFR BLD AUTO: 0.3 %
CREAT BLD-MCNC: 0.86 MG/DL
DEPRECATED RDW RBC AUTO: 41.8 FL (ref 35.1–46.3)
EOSINOPHIL # BLD AUTO: 0.27 X10(3) UL (ref 0–0.7)
EOSINOPHIL NFR BLD AUTO: 3.6 %
ERYTHROCYTE [DISTWIDTH] IN BLOOD BY AUTOMATED COUNT: 13.2 % (ref 11–15)
GLUCOSE BLD-MCNC: 83 MG/DL (ref 70–99)
HCT VFR BLD AUTO: 41.1 %
HGB BLD-MCNC: 13 G/DL
IMM GRANULOCYTES # BLD AUTO: 0.04 X10(3) UL (ref 0–1)
IMM GRANULOCYTES NFR BLD: 0.5 %
LYMPHOCYTES # BLD AUTO: 2.35 X10(3) UL (ref 1–4)
LYMPHOCYTES NFR BLD AUTO: 31 %
MCH RBC QN AUTO: 27.7 PG (ref 26–34)
MCHC RBC AUTO-ENTMCNC: 31.6 G/DL (ref 31–37)
MCV RBC AUTO: 87.4 FL
MONOCYTES # BLD AUTO: 0.71 X10(3) UL (ref 0.1–1)
MONOCYTES NFR BLD AUTO: 9.4 %
NEUTROPHILS # BLD AUTO: 4.18 X10 (3) UL (ref 1.5–7.7)
NEUTROPHILS # BLD AUTO: 4.18 X10(3) UL (ref 1.5–7.7)
NEUTROPHILS NFR BLD AUTO: 55.2 %
PLATELET # BLD AUTO: 273 10(3)UL (ref 150–450)
POTASSIUM SERPL-SCNC: 4.3 MMOL/L (ref 3.5–5.1)
RBC # BLD AUTO: 4.7 X10(6)UL
WBC # BLD AUTO: 7.6 X10(3) UL (ref 4–11)

## 2021-09-01 PROCEDURE — 82947 ASSAY GLUCOSE BLOOD QUANT: CPT

## 2021-09-01 PROCEDURE — 82565 ASSAY OF CREATININE: CPT

## 2021-09-01 PROCEDURE — 36415 COLL VENOUS BLD VENIPUNCTURE: CPT

## 2021-09-01 PROCEDURE — 84132 ASSAY OF SERUM POTASSIUM: CPT

## 2021-09-01 PROCEDURE — 85025 COMPLETE CBC W/AUTO DIFF WBC: CPT

## 2021-09-15 ENCOUNTER — LAB ENCOUNTER (OUTPATIENT)
Dept: LAB | Age: 61
End: 2021-09-15
Attending: INTERNAL MEDICINE
Payer: COMMERCIAL

## 2021-09-15 DIAGNOSIS — D84.9 IMMUNODEFICIENCY (HCC): ICD-10-CM

## 2021-09-15 DIAGNOSIS — Z94.0 RENAL TRANSPLANT RECIPIENT: Primary | ICD-10-CM

## 2021-09-15 DIAGNOSIS — I48.91 ATRIAL FIBRILLATION, NEW ONSET (HCC): ICD-10-CM

## 2021-09-15 DIAGNOSIS — Z79.899 HIGH RISK MEDICATION USE: ICD-10-CM

## 2021-09-15 LAB
BASOPHILS # BLD AUTO: 0.01 X10(3) UL (ref 0–0.2)
BASOPHILS NFR BLD AUTO: 0.1 %
CREAT BLD-MCNC: 0.84 MG/DL
DEPRECATED RDW RBC AUTO: 42.7 FL (ref 35.1–46.3)
EOSINOPHIL # BLD AUTO: 0.26 X10(3) UL (ref 0–0.7)
EOSINOPHIL NFR BLD AUTO: 3.5 %
ERYTHROCYTE [DISTWIDTH] IN BLOOD BY AUTOMATED COUNT: 13.3 % (ref 11–15)
GLUCOSE BLD-MCNC: 131 MG/DL (ref 70–99)
HCT VFR BLD AUTO: 41.1 %
HGB BLD-MCNC: 12.9 G/DL
IMM GRANULOCYTES # BLD AUTO: 0.03 X10(3) UL (ref 0–1)
IMM GRANULOCYTES NFR BLD: 0.4 %
LYMPHOCYTES # BLD AUTO: 2.39 X10(3) UL (ref 1–4)
LYMPHOCYTES NFR BLD AUTO: 32.6 %
MCH RBC QN AUTO: 27.6 PG (ref 26–34)
MCHC RBC AUTO-ENTMCNC: 31.4 G/DL (ref 31–37)
MCV RBC AUTO: 87.8 FL
MONOCYTES # BLD AUTO: 0.71 X10(3) UL (ref 0.1–1)
MONOCYTES NFR BLD AUTO: 9.7 %
NEUTROPHILS # BLD AUTO: 3.93 X10 (3) UL (ref 1.5–7.7)
NEUTROPHILS # BLD AUTO: 3.93 X10(3) UL (ref 1.5–7.7)
NEUTROPHILS NFR BLD AUTO: 53.7 %
PLATELET # BLD AUTO: 236 10(3)UL (ref 150–450)
POTASSIUM SERPL-SCNC: 4.6 MMOL/L (ref 3.5–5.1)
RBC # BLD AUTO: 4.68 X10(6)UL
WBC # BLD AUTO: 7.3 X10(3) UL (ref 4–11)

## 2021-09-15 PROCEDURE — 36415 COLL VENOUS BLD VENIPUNCTURE: CPT

## 2021-09-15 PROCEDURE — 82565 ASSAY OF CREATININE: CPT

## 2021-09-15 PROCEDURE — 82947 ASSAY GLUCOSE BLOOD QUANT: CPT

## 2021-09-15 PROCEDURE — 85025 COMPLETE CBC W/AUTO DIFF WBC: CPT

## 2021-09-15 PROCEDURE — 84132 ASSAY OF SERUM POTASSIUM: CPT

## 2021-10-05 ENCOUNTER — LAB ENCOUNTER (OUTPATIENT)
Dept: LAB | Age: 61
End: 2021-10-05
Attending: INTERNAL MEDICINE
Payer: COMMERCIAL

## 2021-10-05 DIAGNOSIS — Z94.0 RENAL TRANSPLANT RECIPIENT: ICD-10-CM

## 2021-10-05 DIAGNOSIS — Z79.899 HIGH RISK MEDICATION USE: ICD-10-CM

## 2021-10-05 DIAGNOSIS — I48.91 ATRIAL FIBRILLATION, NEW ONSET (HCC): ICD-10-CM

## 2021-10-05 DIAGNOSIS — D84.9 IMMUNODEFICIENCY (HCC): ICD-10-CM

## 2021-10-05 PROCEDURE — 36415 COLL VENOUS BLD VENIPUNCTURE: CPT

## 2021-10-21 ENCOUNTER — LAB ENCOUNTER (OUTPATIENT)
Dept: LAB | Age: 61
End: 2021-10-21
Payer: COMMERCIAL

## 2021-10-21 DIAGNOSIS — Z94.0 RENAL TRANSPLANT RECIPIENT: ICD-10-CM

## 2021-10-21 DIAGNOSIS — I48.91 ATRIAL FIBRILLATION, NEW ONSET (HCC): ICD-10-CM

## 2021-10-21 DIAGNOSIS — D84.9 IMMUNODEFICIENCY (HCC): ICD-10-CM

## 2021-10-21 DIAGNOSIS — Z79.899 HIGH RISK MEDICATION USE: ICD-10-CM

## 2021-10-21 PROCEDURE — 82947 ASSAY GLUCOSE BLOOD QUANT: CPT

## 2021-10-21 PROCEDURE — 36415 COLL VENOUS BLD VENIPUNCTURE: CPT

## 2021-10-21 PROCEDURE — 82565 ASSAY OF CREATININE: CPT

## 2021-10-21 PROCEDURE — 85025 COMPLETE CBC W/AUTO DIFF WBC: CPT

## 2021-10-21 PROCEDURE — 84132 ASSAY OF SERUM POTASSIUM: CPT

## 2021-11-11 ENCOUNTER — LAB ENCOUNTER (OUTPATIENT)
Dept: LAB | Age: 61
End: 2021-11-11
Payer: COMMERCIAL

## 2021-11-11 DIAGNOSIS — Z94.0 RENAL TRANSPLANT RECIPIENT: ICD-10-CM

## 2021-11-11 DIAGNOSIS — D84.9 IMMUNODEFICIENCY (HCC): ICD-10-CM

## 2021-11-11 DIAGNOSIS — Z79.899 HIGH RISK MEDICATION USE: ICD-10-CM

## 2021-11-11 DIAGNOSIS — I48.91 ATRIAL FIBRILLATION, NEW ONSET (HCC): ICD-10-CM

## 2021-11-11 PROCEDURE — 85025 COMPLETE CBC W/AUTO DIFF WBC: CPT

## 2021-11-11 PROCEDURE — 84132 ASSAY OF SERUM POTASSIUM: CPT

## 2021-11-11 PROCEDURE — 82565 ASSAY OF CREATININE: CPT

## 2021-11-11 PROCEDURE — 36415 COLL VENOUS BLD VENIPUNCTURE: CPT

## 2021-11-11 PROCEDURE — 82947 ASSAY GLUCOSE BLOOD QUANT: CPT

## 2021-11-13 ENCOUNTER — APPOINTMENT (OUTPATIENT)
Dept: CT IMAGING | Facility: HOSPITAL | Age: 61
DRG: 309 | End: 2021-11-13
Attending: EMERGENCY MEDICINE
Payer: COMMERCIAL

## 2021-11-13 ENCOUNTER — APPOINTMENT (OUTPATIENT)
Dept: GENERAL RADIOLOGY | Facility: HOSPITAL | Age: 61
DRG: 309 | End: 2021-11-13
Attending: EMERGENCY MEDICINE
Payer: COMMERCIAL

## 2021-11-13 ENCOUNTER — HOSPITAL ENCOUNTER (INPATIENT)
Facility: HOSPITAL | Age: 61
LOS: 1 days | Discharge: HOME | DRG: 309 | End: 2021-11-14
Attending: EMERGENCY MEDICINE | Admitting: HOSPITALIST
Payer: COMMERCIAL

## 2021-11-13 DIAGNOSIS — I48.91 ATRIAL FIBRILLATION WITH RAPID VENTRICULAR RESPONSE (HCC): Primary | ICD-10-CM

## 2021-11-13 PROCEDURE — 96366 THER/PROPH/DIAG IV INF ADDON: CPT

## 2021-11-13 PROCEDURE — 99285 EMERGENCY DEPT VISIT HI MDM: CPT

## 2021-11-13 PROCEDURE — 83880 ASSAY OF NATRIURETIC PEPTIDE: CPT | Performed by: EMERGENCY MEDICINE

## 2021-11-13 PROCEDURE — 85610 PROTHROMBIN TIME: CPT | Performed by: EMERGENCY MEDICINE

## 2021-11-13 PROCEDURE — 85025 COMPLETE CBC W/AUTO DIFF WBC: CPT | Performed by: EMERGENCY MEDICINE

## 2021-11-13 PROCEDURE — 93005 ELECTROCARDIOGRAM TRACING: CPT

## 2021-11-13 PROCEDURE — 84484 ASSAY OF TROPONIN QUANT: CPT | Performed by: EMERGENCY MEDICINE

## 2021-11-13 PROCEDURE — 96365 THER/PROPH/DIAG IV INF INIT: CPT

## 2021-11-13 PROCEDURE — 71045 X-RAY EXAM CHEST 1 VIEW: CPT | Performed by: EMERGENCY MEDICINE

## 2021-11-13 PROCEDURE — 80048 BASIC METABOLIC PNL TOTAL CA: CPT | Performed by: EMERGENCY MEDICINE

## 2021-11-13 PROCEDURE — 85379 FIBRIN DEGRADATION QUANT: CPT | Performed by: EMERGENCY MEDICINE

## 2021-11-13 PROCEDURE — 93010 ELECTROCARDIOGRAM REPORT: CPT | Performed by: EMERGENCY MEDICINE

## 2021-11-13 RX ORDER — DILTIAZEM HYDROCHLORIDE 5 MG/ML
10 INJECTION INTRAVENOUS ONCE
Status: COMPLETED | OUTPATIENT
Start: 2021-11-13 | End: 2021-11-13

## 2021-11-14 VITALS
TEMPERATURE: 99 F | DIASTOLIC BLOOD PRESSURE: 103 MMHG | RESPIRATION RATE: 20 BRPM | BODY MASS INDEX: 30.12 KG/M2 | WEIGHT: 170 LBS | SYSTOLIC BLOOD PRESSURE: 122 MMHG | HEART RATE: 52 BPM | OXYGEN SATURATION: 94 % | HEIGHT: 63 IN

## 2021-11-14 PROCEDURE — 83036 HEMOGLOBIN GLYCOSYLATED A1C: CPT | Performed by: HOSPITALIST

## 2021-11-14 PROCEDURE — 82962 GLUCOSE BLOOD TEST: CPT

## 2021-11-14 RX ORDER — PRASUGREL 10 MG/1
10 TABLET, FILM COATED ORAL DAILY
Status: DISCONTINUED | OUTPATIENT
Start: 2021-11-14 | End: 2021-11-14

## 2021-11-14 RX ORDER — MYCOPHENOLIC ACID 180 MG/1
360 TABLET, DELAYED RELEASE ORAL DAILY
Status: DISCONTINUED | OUTPATIENT
Start: 2021-11-14 | End: 2021-11-14

## 2021-11-14 RX ORDER — PRAVASTATIN SODIUM 20 MG
60 TABLET ORAL NIGHTLY
Status: DISCONTINUED | OUTPATIENT
Start: 2021-11-14 | End: 2021-11-14

## 2021-11-14 RX ORDER — PANTOPRAZOLE SODIUM 40 MG/1
40 TABLET, DELAYED RELEASE ORAL
Status: DISCONTINUED | OUTPATIENT
Start: 2021-11-14 | End: 2021-11-14

## 2021-11-14 RX ORDER — PREDNISONE 1 MG/1
4 TABLET ORAL
Status: DISCONTINUED | OUTPATIENT
Start: 2021-11-14 | End: 2021-11-14

## 2021-11-14 RX ORDER — LORAZEPAM 0.5 MG/1
0.25 TABLET ORAL AS NEEDED
Status: DISCONTINUED | OUTPATIENT
Start: 2021-11-14 | End: 2021-11-14

## 2021-11-14 RX ORDER — ACYCLOVIR 400 MG/1
400 TABLET ORAL
Status: DISCONTINUED | OUTPATIENT
Start: 2021-11-16 | End: 2021-11-14

## 2021-11-14 RX ORDER — TACROLIMUS 0.5 MG/1
0.5 CAPSULE ORAL 2 TIMES DAILY
Status: DISCONTINUED | OUTPATIENT
Start: 2021-11-14 | End: 2021-11-14

## 2021-11-14 RX ORDER — MYCOPHENOLIC ACID 180 MG/1
180 TABLET, DELAYED RELEASE ORAL NIGHTLY
Status: DISCONTINUED | OUTPATIENT
Start: 2021-11-14 | End: 2021-11-14

## 2021-11-14 RX ORDER — EZETIMIBE 10 MG/1
10 TABLET ORAL DAILY
Status: DISCONTINUED | OUTPATIENT
Start: 2021-11-15 | End: 2021-11-14

## 2021-11-14 NOTE — CONSULTS
[address] CONSULT NOTE    Yuly Ahuja Patient Status:  Inpatient    1960 MRN [id number]   Location [facility] 2W/SW Attending Catrachito Lundy MD   Hosp Day # 0 PCP Gwynneth Orf     Date of Admission:   rapid ventricular response ([facility] [address]):   This is a complex 38-year-old with coronary disease status post PCI July with known valve disease and factor V Leiden mutation who presented because she noted recurrent A. fib while at home and felt arm discomfort shortne TUNNEL RELEASE Right 2007   • CATARACT EXTRACTION  1993   • CHOLECYSTECTOMY  2011   • EYE SURGERY  1981    vitrectomy L eye   • HAND/FINGER SURGERY UNLISTED      trigger finger relaeases on 3   • HYSTEROSCOPY  2010    polypectomy   • OTHER SURGICAL HISTORY Intravenous, Continuous      apixaban 5 MG Oral Tab, Take 5 mg by mouth 2 (two) times daily. Bisoprolol Fumarate 5 MG Oral Tab, Take 2.5 mg by mouth 2 (two) times a day. acyclovir 400 MG Oral Tab, Take 400 mg by mouth See Admin Instructions.  Twice a week RASH, OTHER (SEE COMMENTS)  Nexium [Esomeprazol*    OTHER (SEE COMMENTS)  Ibuprofen                   Comment:Pt s/p kidney transplant  Niacin                  OTHER (SEE COMMENTS)    Comment:Fainting  Simvastatin             OTHER (SEE COMMENTS)    Co carotids 2+, no bruits  Cardiac: Regular rate and rhythm. S1,S2 normal, 2/6 systolic ejection murmur  Lungs: Clear with normal effort. Normal excursions and effort. Abdomen: Soft, non-tender. BS-present. Extremities: Without clubbing, cyanosis. or edema.

## 2021-11-14 NOTE — PLAN OF CARE
Pt is declining to take hospital administered medications or ACCU checks. Insulin and CGM agreement signed and completed. Education completed regarding hospital policy; pt reports understanding.

## 2021-11-14 NOTE — ED PROVIDER NOTES
Patient Seen in: [facility] AND [facility] Emergency Department      History   Patient presents with:  Difficulty Breathing    Stated Complaint:     Subjective:   HPI    33-year-old female with extensive history including a history of factor V Leiden on Coumadin wrist   • TRANSPLANTATION OF KIDNEY  1996   • TUBAL LIGATION  1982                Social History    Tobacco Use      Smoking status: Never Smoker      Smokeless tobacco: Never Used    Vaping Use      Vaping Use: Never used    Alcohol use: No    Drug use: N following components:       Result Value    Glucose 189 (*)     All other components within normal limits   PRO BETA NATRIURETIC PEPTIDE - Abnormal; Notable for the following components:    Pro-Beta Natriuretic Peptide 1,163 (*)     All other components wi INDICATIONS: shortness of breath for one hour. TECHNIQUE:   Single view. FINDINGS:  COMMENT: Overlying wires and treatment artifacts obscure fine detail. CARDIAC/VASC: The cardiomediastinal silhouette is not enlarged.  There is mild tortuosity of the tho spent performing procedures.         Admission disposition: 11/13/2021  9:08 PM                                  Disposition and Plan     Clinical Impression:  Atrial fibrillation with rapid ventricular response ([facility] [address]  (primary encounter diagnosis)     Disp

## 2021-11-14 NOTE — PLAN OF CARE
AVS reviewed with pt and her . Discharged home via personal vehicle in no s/s of distress.      Problem: Patient Centered Care  Goal: Patient preferences are identified and integrated in the patient's plan of care  Description: Interventions:  - What hygiene including cough, deep breathe, Incentive Spirometry  - Assess the need for suctioning and perform as needed  - Assess and instruct to report SOB or any respiratory difficulty  - Respiratory Therapy support as indicated  - Manage/alleviate anxiety MD/LIP if interventions unsuccessful or patient reports new pain  - Anticipate increased pain with activity and pre-medicate as appropriate  Outcome: Completed     Problem: RISK FOR INFECTION - ADULT  Goal: Absence of fever/infection during anticipated eufemia

## 2021-11-14 NOTE — DISCHARGE SUMMARY
General Medicine Discharge Summary     Patient ID:  Arpita Patrick  59year old  2/6/1960    Admit date: 11/13/2021    Discharge date and time: No discharge date for patient encounter.  11/14/21    Attending Physician: Mauro Conroy MD     Consults: IP  at bedside.    Patient seen by cardiology and advised to stay overnight for observation but patient decided to leave.      Chronic Conditions:  Factor 5, CAD, A-fib on Eliquis, DM typle 1 on an insulin pump, Kidney transplant, HTN, HL, GERD, HSV  - warfarin 5 MG Tabs  Commonly known as: COUMADIN            FU   Follow-up Information     [place] [facility] In 1 week.     Specialties: Internal Medicine, ENDOCRINOLOGY, Diabetes  Contact information:  [facility]   [id number]

## 2021-11-14 NOTE — OCCUPATIONAL THERAPY NOTE
PT checked in with patient- she verbalized no therapy needs and reports to be at her baseline. Has been ambulatory with staff and up to bathroom for self care. If change in status occurs, please re-order OT services.

## 2021-11-14 NOTE — PLAN OF CARE
Pt safety maintained. Admission assessment done. Pt alert and oriented x 4. Ambulate with minimal assist. Can turn and reposition self. IVL intact. No complaints of pain or respiratory distress. Pt has own continous glucose monitoring and insulin pump.  BP if interventions unsuccessful or patient reports new pain  - Anticipate increased pain with activity and pre-medicate as appropriate  Outcome: Progressing     Problem: RISK FOR INFECTION - ADULT  Goal: Absence of fever/infection during anticipated neutrope

## 2021-11-14 NOTE — ED QUICK NOTES
Orders for admission, patient is aware of plan and ready to go upstairs. Any questions please call ED RN Vivian Parham at extension *[phone]**.      Type of COVID test sent: Pending  COVID Suspicion level: LOW    Titratable drugs infusing: Cardizem  Rate:    LOC at

## 2021-11-14 NOTE — PHYSICAL THERAPY NOTE
Chart reviewed pt for PT evaluation and consulted with RN, Jazmine Martinez. Pt received sitting up in the recliner. Pt reported she is moving around independently, feels back to normal, and does not have any physical therapy needs at this time.  PT orders acknowled

## 2021-11-14 NOTE — H&P
[facility] Hospitalist H&P       CC: Patient presents with:  Difficulty Breathing       PCP: Burton [address]    History of Present Illness: Patient is a 59year old female with PMH sig for Factor 5, CAD, A-fib on Eliquis, DM typle 1 on an insulin pump, Kidney tr rotator cuff repair R   • SKIN SURGERY  2009    skin cancer excision L wrist   • TRANSPLANTATION OF KIDNEY  1996   • TUBAL LIGATION  1982        ALL:    Atenolol                OTHER (SEE COMMENTS)    Comment:All beta blockers, sensitivity  Beta Adrenergic FREESTYLE TEST In Vitro Strip, TEST 4 TO 6 TIMES D, Disp: , Rfl: 3  insulin aspart 100 UNIT/ML Subcutaneous Solution, Inject into the skin 3 (three) times daily before meals.  Per pump, Disp: , Rfl:   Prasugrel HCl 10 MG Oral Tab, Take 10 mg by mouth clifford 87.4   .0 262.0   INR  --  1.15       Recent Labs   Lab 11/11/21  0731 11/13/21 2002   NA  --  136   K 4.3 4.3   CL  --  104   CO2  --  25.0   BUN  --  16   CREATSERUM 0.94 0.92   * 189*   CA  --  9.5       No results for input(s): ALT, AST, house    Patient and/or patient's family given opportunity to ask questions and note understanding and agreeing with therapeutic plan as outlined    Thank You,  Niko Mckeon MD    [facility][1] and [facility] Hospitalist  Answering Service number: [phone]

## 2021-11-15 NOTE — PAYOR COMM NOTE
--------------  ADMISSION REVIEW     Payor: Jaxon Boggs 150 PPO  Subscriber #:  [id number]  Authorization Number: [id number]    Admit date: 11/14/21  Admit time: 12:22 AM     11/13 Patient Seen in: [facility] Emergency Department    History   Patient present • SHOULDER SURG PROC UNLISTED  1990    rotator cuff repair R   • SKIN SURGERY  2009    skin cancer excision L wrist   • TRANSPLANTATION OF KIDNEY  1996   • TUBAL LIGATION  1982     Physical Exam     ED Triage Vitals   BP 11/13/21 2003 122/88   Pulse 11/1 hemiblock, PVC with no acute ST elevation morphologically similar. EKG to prior other than an L sinus rate     XR CHEST AP PORTABLE   Stable minor atelectasis or scarring in the left midlung.   Otherwise, negative for radiographically evident acute intrath feels fine and wants to follow up with her cardiologist at [facility]. She denies CP, abd pain, n/v/d, f/c.   Discussed with  at bedside.      ALL:    Atenolol                OTHER (SEE COMMENTS)    Comment:All beta blockers, sensitivity  Beta A CREATSERUM 0.94 0.92   * 189*   CA  --  9.5     Lab 11/13/21 2002 11/13/21 2158   TROP <0.045 <0.045       ASSESSMENT / PLAN:   Patient is a 59year old female with PMH sig for Factor 5, CAD, A-fib on Eliquis, DM typle 1 on an insulin pump, Darell states she still gets arm pain when her heart is beating fast even after PCI in July. She is presently comfortable back in sinus rhythm. EKG earlier showed A. fib with nonspecific ST changes LVH and left anterior hemiblock.   Patient is under stress and h with her Eliquis.     Hyperlipidemia on meds  Last LDL check to 60 in July.   Should have follow-up labs in statin dose adjusted if LDL rising.     Recommendations:  As above discussed with patient and       Dc home      Vitals (last day) before disc

## 2021-11-16 NOTE — PAYOR COMM NOTE
--------------  DISCHARGE REVIEW    Payor: ADRIANNA FERNANDEZ  Subscriber #:  [id number]  Authorization Number: [id number]    Admit date: 11/14/21  Admit time:  12:22 AM  Discharge Date: 11/14/2021 12:34 PM     Admitting Physician: Johan Waller MD  Attending y Pita. She denies CP, abd pain, n/v/d, f/c.   Discussed with  at bedside.      Hospital Course:   Patient is a 59year old female with PMH sig for Factor 5, CAD, A-fib on Eliquis, DM typle 1 on an insulin pump, Kidney transplant, HTN, HL, G UNIT/ML Soln  Commonly known as: NOVOLOG     lansoprazole 30 MG Cpdr  Commonly known as: PREVACID     LORazepam 0.5 MG Tabs  Commonly known as: ATIVAN     Magnesium 100 MG Tabs     mycophenolate sodium 180 MG Tbec  Commonly known as: MYFORTIC     Pentamidi

## 2021-12-15 ENCOUNTER — HOSPITAL ENCOUNTER (EMERGENCY)
Facility: HOSPITAL | Age: 61
Discharge: HOME | End: 2021-12-15
Attending: EMERGENCY MEDICINE
Payer: COMMERCIAL

## 2021-12-15 VITALS
SYSTOLIC BLOOD PRESSURE: 92 MMHG | DIASTOLIC BLOOD PRESSURE: 71 MMHG | BODY MASS INDEX: 28.7 KG/M2 | HEIGHT: 63 IN | RESPIRATION RATE: 18 BRPM | HEART RATE: 58 BPM | WEIGHT: 162 LBS | OXYGEN SATURATION: 95 % | TEMPERATURE: 98 F

## 2021-12-15 DIAGNOSIS — I48.91 ATRIAL FIBRILLATION WITH RAPID VENTRICULAR RESPONSE (HCC): Primary | ICD-10-CM

## 2021-12-15 PROCEDURE — 85025 COMPLETE CBC W/AUTO DIFF WBC: CPT | Performed by: EMERGENCY MEDICINE

## 2021-12-15 PROCEDURE — 83735 ASSAY OF MAGNESIUM: CPT | Performed by: EMERGENCY MEDICINE

## 2021-12-15 PROCEDURE — 96374 THER/PROPH/DIAG INJ IV PUSH: CPT

## 2021-12-15 PROCEDURE — 93010 ELECTROCARDIOGRAM REPORT: CPT | Performed by: EMERGENCY MEDICINE

## 2021-12-15 PROCEDURE — 84484 ASSAY OF TROPONIN QUANT: CPT | Performed by: EMERGENCY MEDICINE

## 2021-12-15 PROCEDURE — 99284 EMERGENCY DEPT VISIT MOD MDM: CPT

## 2021-12-15 PROCEDURE — 80048 BASIC METABOLIC PNL TOTAL CA: CPT | Performed by: EMERGENCY MEDICINE

## 2021-12-15 PROCEDURE — 93005 ELECTROCARDIOGRAM TRACING: CPT

## 2021-12-15 PROCEDURE — 96361 HYDRATE IV INFUSION ADD-ON: CPT

## 2021-12-15 PROCEDURE — 80076 HEPATIC FUNCTION PANEL: CPT | Performed by: EMERGENCY MEDICINE

## 2021-12-15 RX ORDER — DILTIAZEM HYDROCHLORIDE 5 MG/ML
10 INJECTION INTRAVENOUS ONCE
Status: COMPLETED | OUTPATIENT
Start: 2021-12-15 | End: 2021-12-15

## 2021-12-15 NOTE — ED INITIAL ASSESSMENT (HPI)
Pt to ED via EMS with c/o nausea and abnormal heart rate that started about 10 min prior to arrival. Pt with hx of AFIB on Eliquis. Pt denies chest pain or sob. No respiratory distress noted. Pt denies cough or fever. Pt denies abdominal pain.  No lower leg

## 2021-12-15 NOTE — CONSULTS
[address] CONSULT NOTE    Nigel Callahan Patient Status:  Emergency    1960 MRN [id number]   Location [address] Attending Geo Rao MD   Hosp Day # 0 SHAYNE Rodriguez Seen     Date of Adm Illness:   Patient is a 59year old female who was admitted to the hospital for Atrial fibrillation with rapid ventricular response ([facility] [address]):   This is a 68-year-old with coronary disease status post PCI in July with known valve disease factor V Leiden mutatio Surgical History  Past Surgical History:   Procedure Laterality Date   • BLOOD TRANSFUSIONS     • CARDIAC SURG PROCEDURE UNLIST      CAD/Cardiac Stents (Mid LAD +Distal RCA)   • CARPAL TUNNEL RELEASE Right 2007   • CATARACT EXTRACTION  1993   • [address] OTHER (SEE COMMENTS)  Mepron [Atovaquone]     RASH, OTHER (SEE COMMENTS)  Nexium [Esomeprazol*    OTHER (SEE COMMENTS)  Ibuprofen                   Comment:Pt s/p kidney transplant  Niacin                  OTHER (SEE COMMENTS)    Comment: Tico Borden deficits. Psychiatric: Coooperative, calm. Alert and oriented x 3  Skin: Warm and dry.  No rash    Results:     Laboratory Data:  Lab Results   Component Value Date    WBC 10.4 12/15/2021    HGB 13.9 12/15/2021    HCT 44.2 12/15/2021    .0 12/15/202

## 2021-12-15 NOTE — ED PROVIDER NOTES
Patient Seen in: [facility] AND [facility] Emergency Department      History   Patient presents with:  Nausea/Vomiting/Diarrhea  Arrythmia/Palpitations    Stated Complaint:     Subjective:   HPI    75-year-old female with a history of kidney transplant and recur TRANSPLANTATION OF KIDNEY  1996   • TUBAL LIGATION  1982                Social History    Tobacco Use      Smoking status: Never Smoker      Smokeless tobacco: Never Used    Vaping Use      Vaping Use: Never used    Alcohol use: No    Drug use:  No SARS-COV-2 BY PCR - Normal   CBC WITH DIFFERENTIAL WITH PLATELET    Narrative: The following orders were created for panel order CBC With Differential With Platelet.   Procedure                               Abnormality         Status that you schedule follow up care with a primary care provider within the next three months to obtain basic health screening including reassessment of your blood pressure.       Medications Prescribed:  Current Discharge Medication List

## 2021-12-18 ENCOUNTER — LAB ENCOUNTER (OUTPATIENT)
Dept: LAB | Age: 61
End: 2021-12-18
Payer: COMMERCIAL

## 2021-12-18 DIAGNOSIS — I48.91 ATRIAL FIBRILLATION, NEW ONSET (HCC): ICD-10-CM

## 2021-12-18 DIAGNOSIS — Z94.0 RENAL TRANSPLANT RECIPIENT: ICD-10-CM

## 2021-12-18 DIAGNOSIS — D84.9 IMMUNODEFICIENCY (HCC): ICD-10-CM

## 2021-12-18 DIAGNOSIS — Z79.899 HIGH RISK MEDICATION USE: ICD-10-CM

## 2021-12-18 PROCEDURE — 36415 COLL VENOUS BLD VENIPUNCTURE: CPT

## 2021-12-23 ENCOUNTER — HOSPITAL ENCOUNTER (INPATIENT)
Facility: HOSPITAL | Age: 61
LOS: 1 days | Discharge: HOME | DRG: 309 | End: 2021-12-24
Attending: EMERGENCY MEDICINE | Admitting: HOSPITALIST
Payer: COMMERCIAL

## 2021-12-23 ENCOUNTER — APPOINTMENT (OUTPATIENT)
Dept: GENERAL RADIOLOGY | Facility: HOSPITAL | Age: 61
DRG: 309 | End: 2021-12-23
Attending: EMERGENCY MEDICINE
Payer: COMMERCIAL

## 2021-12-23 DIAGNOSIS — I48.91 ATRIAL FIBRILLATION WITH RVR (HCC): Primary | ICD-10-CM

## 2021-12-23 LAB
ANION GAP SERPL CALC-SCNC: 8 MMOL/L (ref 0–18)
BASOPHILS # BLD AUTO: 0.02 X10(3) UL (ref 0–0.2)
BASOPHILS NFR BLD AUTO: 0.2 %
BILIRUB UR QL: NEGATIVE
BUN BLD-MCNC: 11 MG/DL (ref 7–18)
BUN/CREAT SERPL: 12.5 (ref 10–20)
CALCIUM BLD-MCNC: 9.5 MG/DL (ref 8.5–10.1)
CHLORIDE SERPL-SCNC: 101 MMOL/L (ref 98–112)
CLARITY UR: CLEAR
CO2 SERPL-SCNC: 23 MMOL/L (ref 21–32)
COLOR UR: COLORLESS
CREAT BLD-MCNC: 0.88 MG/DL
DEPRECATED RDW RBC AUTO: 40.3 FL (ref 35.1–46.3)
EOSINOPHIL # BLD AUTO: 0.06 X10(3) UL (ref 0–0.7)
EOSINOPHIL NFR BLD AUTO: 0.6 %
ERYTHROCYTE [DISTWIDTH] IN BLOOD BY AUTOMATED COUNT: 13.2 % (ref 11–15)
GLUCOSE BLD-MCNC: 232 MG/DL (ref 70–99)
GLUCOSE BLDC GLUCOMTR-MCNC: 229 MG/DL (ref 70–99)
GLUCOSE UR-MCNC: >=500 MG/DL
HCT VFR BLD AUTO: 40.9 %
HGB BLD-MCNC: 13.4 G/DL
HGB UR QL STRIP.AUTO: NEGATIVE
IMM GRANULOCYTES # BLD AUTO: 0.02 X10(3) UL (ref 0–1)
IMM GRANULOCYTES NFR BLD: 0.2 %
LEUKOCYTE ESTERASE UR QL STRIP.AUTO: NEGATIVE
LYMPHOCYTES # BLD AUTO: 2.72 X10(3) UL (ref 1–4)
LYMPHOCYTES NFR BLD AUTO: 26.6 %
MCH RBC QN AUTO: 28 PG (ref 26–34)
MCHC RBC AUTO-ENTMCNC: 32.8 G/DL (ref 31–37)
MCV RBC AUTO: 85.4 FL
MONOCYTES # BLD AUTO: 0.6 X10(3) UL (ref 0.1–1)
MONOCYTES NFR BLD AUTO: 5.9 %
NEUTROPHILS # BLD AUTO: 6.8 X10 (3) UL (ref 1.5–7.7)
NEUTROPHILS # BLD AUTO: 6.8 X10(3) UL (ref 1.5–7.7)
NEUTROPHILS NFR BLD AUTO: 66.5 %
NITRITE UR QL STRIP.AUTO: NEGATIVE
OSMOLALITY SERPL CALC.SUM OF ELEC: 281 MOSM/KG (ref 275–295)
PH UR: 7 [PH] (ref 5–8)
PLATELET # BLD AUTO: 243 10(3)UL (ref 150–450)
POTASSIUM SERPL-SCNC: 4.4 MMOL/L (ref 3.5–5.1)
PROT UR-MCNC: NEGATIVE MG/DL
RBC # BLD AUTO: 4.79 X10(6)UL
SARS-COV-2 RNA RESP QL NAA+PROBE: NOT DETECTED
SODIUM SERPL-SCNC: 132 MMOL/L (ref 136–145)
SP GR UR STRIP: 1 (ref 1–1.03)
TROPONIN I HIGH SENSITIVITY: 11 NG/L
TROPONIN I HIGH SENSITIVITY: 16 NG/L
TROPONIN I HIGH SENSITIVITY: 22 NG/L
UROBILINOGEN UR STRIP-ACNC: <2
WBC # BLD AUTO: 10.2 X10(3) UL (ref 4–11)

## 2021-12-23 PROCEDURE — 84484 ASSAY OF TROPONIN QUANT: CPT | Performed by: EMERGENCY MEDICINE

## 2021-12-23 PROCEDURE — 93010 ELECTROCARDIOGRAM REPORT: CPT | Performed by: EMERGENCY MEDICINE

## 2021-12-23 PROCEDURE — 81003 URINALYSIS AUTO W/O SCOPE: CPT | Performed by: EMERGENCY MEDICINE

## 2021-12-23 PROCEDURE — 80048 BASIC METABOLIC PNL TOTAL CA: CPT | Performed by: EMERGENCY MEDICINE

## 2021-12-23 PROCEDURE — 99285 EMERGENCY DEPT VISIT HI MDM: CPT

## 2021-12-23 PROCEDURE — 93005 ELECTROCARDIOGRAM TRACING: CPT

## 2021-12-23 PROCEDURE — 84484 ASSAY OF TROPONIN QUANT: CPT | Performed by: HOSPITALIST

## 2021-12-23 PROCEDURE — 82962 GLUCOSE BLOOD TEST: CPT

## 2021-12-23 PROCEDURE — 85025 COMPLETE CBC W/AUTO DIFF WBC: CPT | Performed by: EMERGENCY MEDICINE

## 2021-12-23 PROCEDURE — 96365 THER/PROPH/DIAG IV INF INIT: CPT

## 2021-12-23 PROCEDURE — 96366 THER/PROPH/DIAG IV INF ADDON: CPT

## 2021-12-23 RX ORDER — PREDNISONE 1 MG/1
4 TABLET ORAL
Status: DISCONTINUED | OUTPATIENT
Start: 2021-12-24 | End: 2021-12-24

## 2021-12-23 RX ORDER — DEXTROSE MONOHYDRATE 25 G/50ML
50 INJECTION, SOLUTION INTRAVENOUS
Status: DISCONTINUED | OUTPATIENT
Start: 2021-12-23 | End: 2021-12-24

## 2021-12-23 RX ORDER — DILTIAZEM HYDROCHLORIDE 5 MG/ML
INJECTION INTRAVENOUS
Status: COMPLETED
Start: 2021-12-23 | End: 2021-12-23

## 2021-12-23 RX ORDER — MYCOPHENOLIC ACID 180 MG/1
360 TABLET, DELAYED RELEASE ORAL
Status: DISCONTINUED | OUTPATIENT
Start: 2021-12-24 | End: 2021-12-24

## 2021-12-23 RX ORDER — DILTIAZEM HYDROCHLORIDE 5 MG/ML
10 INJECTION INTRAVENOUS ONCE
Status: COMPLETED | OUTPATIENT
Start: 2021-12-23 | End: 2021-12-23

## 2021-12-23 RX ORDER — CEPHALEXIN 500 MG/1
500 TABLET ORAL 2 TIMES DAILY WITH MEALS
COMMUNITY
Start: 2021-12-21 | End: 2021-12-31

## 2021-12-23 RX ORDER — PRASUGREL 5 MG/1
10 TABLET, FILM COATED ORAL DAILY
Status: DISCONTINUED | OUTPATIENT
Start: 2021-12-23 | End: 2021-12-24

## 2021-12-23 RX ORDER — LORAZEPAM 0.5 MG/1
0.25 TABLET ORAL AS NEEDED
Status: DISCONTINUED | OUTPATIENT
Start: 2021-12-23 | End: 2021-12-24

## 2021-12-23 RX ORDER — ONDANSETRON 2 MG/ML
4 INJECTION INTRAMUSCULAR; INTRAVENOUS EVERY 6 HOURS PRN
Status: DISCONTINUED | OUTPATIENT
Start: 2021-12-23 | End: 2021-12-24

## 2021-12-23 RX ORDER — PRAVASTATIN SODIUM 20 MG
60 TABLET ORAL NIGHTLY
Status: DISCONTINUED | OUTPATIENT
Start: 2021-12-23 | End: 2021-12-24

## 2021-12-23 RX ORDER — POLYETHYLENE GLYCOL 3350 17 G/17G
17 POWDER, FOR SOLUTION ORAL DAILY PRN
Status: DISCONTINUED | OUTPATIENT
Start: 2021-12-23 | End: 2021-12-24

## 2021-12-23 RX ORDER — PANTOPRAZOLE SODIUM 40 MG/1
40 TABLET, DELAYED RELEASE ORAL
Status: DISCONTINUED | OUTPATIENT
Start: 2021-12-23 | End: 2021-12-24

## 2021-12-23 RX ORDER — BISACODYL 10 MG
10 SUPPOSITORY, RECTAL RECTAL
Status: DISCONTINUED | OUTPATIENT
Start: 2021-12-23 | End: 2021-12-24

## 2021-12-23 RX ORDER — MYCOPHENOLIC ACID 180 MG/1
360 TABLET, DELAYED RELEASE ORAL
Status: DISCONTINUED | OUTPATIENT
Start: 2021-12-23 | End: 2021-12-23

## 2021-12-23 RX ORDER — SENNOSIDES 8.6 MG
17.2 TABLET ORAL NIGHTLY PRN
Status: DISCONTINUED | OUTPATIENT
Start: 2021-12-23 | End: 2021-12-24

## 2021-12-23 RX ORDER — CEPHALEXIN 500 MG/1
500 CAPSULE ORAL 2 TIMES DAILY WITH MEALS
Status: DISCONTINUED | OUTPATIENT
Start: 2021-12-23 | End: 2021-12-24

## 2021-12-23 RX ORDER — TACROLIMUS 0.5 MG/1
0.5 CAPSULE ORAL EVERY 12 HOURS
Status: DISCONTINUED | OUTPATIENT
Start: 2021-12-23 | End: 2021-12-24

## 2021-12-23 RX ORDER — ACETAMINOPHEN 325 MG/1
650 TABLET ORAL EVERY 6 HOURS PRN
Status: DISCONTINUED | OUTPATIENT
Start: 2021-12-23 | End: 2021-12-24

## 2021-12-23 RX ORDER — MYCOPHENOLIC ACID 180 MG/1
180 TABLET, DELAYED RELEASE ORAL
Status: DISCONTINUED | OUTPATIENT
Start: 2021-12-23 | End: 2021-12-24

## 2021-12-23 RX ORDER — SODIUM PHOSPHATE, DIBASIC AND SODIUM PHOSPHATE, MONOBASIC 7; 19 G/133ML; G/133ML
1 ENEMA RECTAL ONCE AS NEEDED
Status: DISCONTINUED | OUTPATIENT
Start: 2021-12-23 | End: 2021-12-24

## 2021-12-23 NOTE — ED QUICK NOTES
At this time, cardizem drip to be started and pt to be prepped for cath lab but not to be transported at this time.

## 2021-12-23 NOTE — CONSULTS
Cardiology (consult dictated)    Assessment:  1. Paroxysmal atrial fibrillation. Onset of palpitations about 35 minutes prior to arrival in the ED. 2.  Left arm pain which the patient identifies as her angina.   Onset at the same time as the palpitatio

## 2021-12-23 NOTE — CONSULTS
[facility]    PATIENT'S NAME: Jannie Yuan   ATTENDING PHYSICIAN: Magdiel Borden MD   CONSULTING PHYSICIAN: Ham Mosley MD   PATIENT ACCOUNT#:   [id number]    LOCATION:  [place] [address]  MEDICAL RECORD #:   [id number]       DATE OF CLEMENT sulfa and several medications which are not true allergies but only sensitivity. Uncertain as to what are true allergies among this list.     SOCIAL HISTORY:  She is a nonsmoker, nondrinker. Her son is with her today.     PHYSICAL EXAMINATION:    GENERAL: consultation. Dictated By Lucio Alonzo.  Russ Silva MD  d: 12/23/2021 13:28:55  t: 12/23/2021 13:54:11  Job [id number]  [facility]/

## 2021-12-23 NOTE — ED PROVIDER NOTES
Patient Seen in: [facility] AND [facility] Emergency Department      History   Patient presents with:  Arrythmia/Palpitations    Stated Complaint: irregular HR    Subjective:   HPI    40-year-old female with history of hypertension, diabetes, hyperlipidemia, cor scanned in document provided by patient   • SHOULDER SURG [facility]  2008    benign tumor removed L shoulder blade   • SHOULDER SURG [facility]    rotator cuff repair R   • SKIN SURGERY  2009    skin cancer excision L wrist   • TRANSPLANTATION within normal limits   TROPONIN I HIGH SENSITIVITY - Normal   RAPID SARS-COV-2 BY PCR - Normal   CBC WITH DIFFERENTIAL WITH PLATELET    Narrative: The following orders were created for panel order CBC With Differential With Platelet.   Procedure Disposition and Plan     Clinical Impression:  Atrial fibrillation with RVR ([facility] [address]  (primary encounter diagnosis)     Disposition:  Admit  12/23/2021  1:45 pm    Follow-up:  No follow-up provider specified.   We recommend that you schedule follow up

## 2021-12-23 NOTE — ED QUICK NOTES
Orders for admission, patient is aware of plan and ready to go upstairs. Any questions, please call ED KJ Ackerman  at extension [phone].    Type of COVID test sent: Rapid - negative  COVID Suspicion level: Low    Titratable drug(s) infusing: Cardizem   Rate: 5m

## 2021-12-23 NOTE — ED QUICK NOTES
Pt attached to tele box at this time, tele box confirmed with central tele tech. Pt transported to unit with all belongings in no acute distress.

## 2021-12-23 NOTE — ED QUICK NOTES
At this time, pt's HR decreased to 39bpm and pt noted to be converted into sinus rhythm. Pt's HR increased to 48-50 bpm.   Notified, verbal orders to discontinue Cardizem drip at this time.

## 2021-12-23 NOTE — ED QUICK NOTES
Pt expressing concern about cardizem. She discussed this with Dr Aly Colin. She agrees to 10mg bolus and 5mg/hr infusion. Family at bedside.

## 2021-12-23 NOTE — ED INITIAL ASSESSMENT (HPI)
Pt arrived to ED via EMS for left arm onset 20 mins ago with irregular rhythm. Pt has hx of afib and takes apixaban 5mg.denies nausea/vomiting, shortness of breath. Denies chest pain. Pt is A&OX4, answering questions and following commands.  Dr. Kirk To and

## 2021-12-23 NOTE — H&P
[facility] Hospitalist H&P       CC: Patient presents with:  Arrythmia/Palpitations       PCP: Mago Monteiro    History of Present Illness: Patient is a 59year old female with PMH sig for Factor 5, CAD s/p stents, Afib on eliquis, DM type 1 o UNLISTED  2008    benign tumor removed L shoulder blade   • SHOULDER SURG PROC UNLISTED  1990    rotator cuff repair R   • SKIN SURGERY  2009    skin cancer excision L wrist   • TRANSPLANTATION OF KIDNEY  1996   • TUBAL LIGATION  1982        ALL:    Atenol Throat: Lips, mucosa, and tongue normal. Teeth and gums normal.   Neck: Supple,    Lungs:   Clear to auscultation bilaterally. Normal effort   Chest wall:  No tenderness or deformity. Heart:  irreg irreg   Abdomen:   Soft, non-tender.  Bowel sounds norm IVF:none  - Diet: ada    DVT Prophy:eliquis  Lines: PIV    Dispo: pending clinical course    Outpatient records or previous hospital records reviewed. Further recommendations pending patient's clinical course.   [facility] hospitalist to continue to follow pat

## 2021-12-24 VITALS
BODY MASS INDEX: 30 KG/M2 | SYSTOLIC BLOOD PRESSURE: 111 MMHG | HEART RATE: 53 BPM | TEMPERATURE: 98 F | OXYGEN SATURATION: 97 % | WEIGHT: 168.5 LBS | DIASTOLIC BLOOD PRESSURE: 71 MMHG | RESPIRATION RATE: 18 BRPM

## 2021-12-24 LAB
ANION GAP SERPL CALC-SCNC: 9 MMOL/L (ref 0–18)
BASOPHILS # BLD AUTO: 0.02 X10(3) UL (ref 0–0.2)
BASOPHILS NFR BLD AUTO: 0.2 %
BUN BLD-MCNC: 13 MG/DL (ref 7–18)
BUN/CREAT SERPL: 13.8 (ref 10–20)
CALCIUM BLD-MCNC: 9.4 MG/DL (ref 8.5–10.1)
CHLORIDE SERPL-SCNC: 102 MMOL/L (ref 98–112)
CO2 SERPL-SCNC: 27 MMOL/L (ref 21–32)
CREAT BLD-MCNC: 0.94 MG/DL
DEPRECATED RDW RBC AUTO: 42 FL (ref 35.1–46.3)
EOSINOPHIL # BLD AUTO: 0.12 X10(3) UL (ref 0–0.7)
EOSINOPHIL NFR BLD AUTO: 1.5 %
ERYTHROCYTE [DISTWIDTH] IN BLOOD BY AUTOMATED COUNT: 13.1 % (ref 11–15)
GLUCOSE BLD-MCNC: 109 MG/DL (ref 70–99)
HCT VFR BLD AUTO: 40.6 %
HGB BLD-MCNC: 12.9 G/DL
IMM GRANULOCYTES # BLD AUTO: 0.03 X10(3) UL (ref 0–1)
IMM GRANULOCYTES NFR BLD: 0.4 %
LYMPHOCYTES # BLD AUTO: 2.84 X10(3) UL (ref 1–4)
LYMPHOCYTES NFR BLD AUTO: 34.3 %
MCH RBC QN AUTO: 28 PG (ref 26–34)
MCHC RBC AUTO-ENTMCNC: 31.8 G/DL (ref 31–37)
MCV RBC AUTO: 88.1 FL
MONOCYTES # BLD AUTO: 0.73 X10(3) UL (ref 0.1–1)
MONOCYTES NFR BLD AUTO: 8.8 %
NEUTROPHILS # BLD AUTO: 4.53 X10 (3) UL (ref 1.5–7.7)
NEUTROPHILS # BLD AUTO: 4.53 X10(3) UL (ref 1.5–7.7)
NEUTROPHILS NFR BLD AUTO: 54.8 %
OSMOLALITY SERPL CALC.SUM OF ELEC: 287 MOSM/KG (ref 275–295)
PHOSPHATE SERPL-MCNC: 4.1 MG/DL (ref 2.5–4.9)
PLATELET # BLD AUTO: 255 10(3)UL (ref 150–450)
POTASSIUM SERPL-SCNC: 4.4 MMOL/L (ref 3.5–5.1)
RBC # BLD AUTO: 4.61 X10(6)UL
SODIUM SERPL-SCNC: 138 MMOL/L (ref 136–145)
WBC # BLD AUTO: 8.3 X10(3) UL (ref 4–11)

## 2021-12-24 PROCEDURE — 80048 BASIC METABOLIC PNL TOTAL CA: CPT | Performed by: HOSPITALIST

## 2021-12-24 PROCEDURE — 85025 COMPLETE CBC W/AUTO DIFF WBC: CPT | Performed by: HOSPITALIST

## 2021-12-24 PROCEDURE — 84100 ASSAY OF PHOSPHORUS: CPT | Performed by: HOSPITALIST

## 2021-12-24 NOTE — PROGRESS NOTES
This RN admitted patient from ED. Pt admitted for afib RVR. Per ED RN Gina Mg, patient converted to NSR in ED and cardizem gtts stopped. Patient has omnipod and dexcom monitor, Dr Jonathon Zelaya aware.  Patient refusing finger sticks, yet remains ACHS and bedtime suga

## 2021-12-24 NOTE — PROGRESS NOTES
[address] PROGRESS NOTE  Rickey Mcfarlane Patient Status:  Inpatient    1960 MRN [id number]   [facility] 3W/ Attend .0 12/24/2021     Lab Results   Component Value Date    INR 1.15 11/13/2021     Lab Results   Component Value Date     12/24/2021    K 4.4 12/24/2021     12/24/2021    CO2 27.0 12/24/2021    BUN 13 12/24/2021    CREATSERUM 0.94 12/24/202

## 2021-12-24 NOTE — CM/SW NOTE
12/24/21 0900   Discharge disposition   Expected discharge disposition Home or Self   Discharge transportation Private car     Pt discussed during nursing rounds. Pt is stable for dc today. MD dc order entered. No home care needs identified on dc.

## 2021-12-24 NOTE — DISCHARGE PLANNING
Patient was provided with discharge instructions, education, and follow up information; patient's  Amita Doran was also present for instructions with patient's consent.  Patient verbalizes understanding of follow up information, specifically next medicat

## 2021-12-24 NOTE — PLAN OF CARE
Pt is A&Ox4. On RA. Up with self. Dressings changed. V/S stable. No calls from tele. Checked blood sugar via blood sugar monitor. Pt educated on letting nurses scan medications before taking her own.  Pt refused to wait for pharmacy to send medications and coronary artery perfusion - ex.  Angina  - Evaluate fluid balance, assess for edema, trend weights  Outcome: Progressing  Goal: Absence of cardiac arrhythmias or at baseline  Description: INTERVENTIONS:  - Continuous cardiac monitoring, monitor vital signs,

## 2021-12-24 NOTE — CONSULTS
[facility] HOSP - [facility]    Report of Endocrinology Consultation  [facility]    [place] Patient Status:  Inpatient    1960 MRN [id number]   Location [facility] 3W/SW Attending Reynaldo Hernandez MD   Hosp Day # 0 PCP Emani Hare 1993   • CHOLECYSTECTOMY  2011   • EYE SURGERY  1981    vitrectomy L eye   • HAND/FINGER SURGERY UNLISTED      trigger finger relaeases on 3   • HYSTEROSCOPY  2010    polypectomy   • OTHER SURGICAL HISTORY      see scanned in document provided by patient BID AC  •  LORazepam (ATIVAN) tab 0.25 mg, 0.25 mg, Oral, PRN  •  prasugrel (Effient) tab 10 mg, 10 mg, Oral, Daily  •  pravastatin (PRAVACHOL) tab 60 mg, 60 mg, Oral, Nightly  •  [START ON 12/24/2021] predniSONE (DELTASONE) tab 4 mg, 4 mg, Oral, Daily wit %.    General: Alert, orientated x3. Cooperative. No apparent distress. HEENT:  EOMI, thyroid non-enlarged   Neck: Supple. Lungs: nonlabored respirations. Cardiac: Regular rate and rhythm.   Abdomen:  nondistended  Extremities:  No lower extremity aston

## 2021-12-27 NOTE — PAYOR COMM NOTE
--------------  ADMISSION REVIEW     Payor: ADRIANNA FERNANDEZ  Subscriber #:  [id number]  Authorization Number: [id number]    Admit date: 12/23/21  Admit time:  3:42 PM       REVIEW DOCUMENTATION:     ED Provider Notes      ED Provider Notes signed by Kaur Lo Trigger finger, right little finger 2015              Past Surgical History:   Procedure Laterality Date   • BLOOD TRANSFUSIONS     • CARDIAC SURG PROCEDURE UNLIST      CAD/Cardiac Stents (Mid LAD +Distal RCA)   • CARPAL TUNNEL RELEASE Right 2007   • CATAR No leg edema or tenderness noted.   Psychiatric: patient is oriented X 3, there is no agitation    ED Course     Labs Reviewed   BASIC METABOLIC PANEL (8) - Abnormal; Notable for the following components:       Result Value    Glucose 232 (*)     Sodium 132 obtaining history, performing a physical exam, bedside monitoring of interventions, collecting and interpreting tests and discussion with consultants but not including time spent performing procedures.     Admission disposition: 12/23/2021  1:45 PM and was noted to be in afib w/RVR. She notes some left arm pain, no nausea, no diaphoresis, denies fevers or chills, no headaches or vision changes, no other complaints. Currently has minimal to no pain.           • Rhabdomyolysis due to statin therapy 20 gastroparesis, who presents with Afib with RVR.       Paroxysmal Afib with RVR  - improved with IV Dilt, continue drip  - continue eliquis  - cardiology consulted  - tele   - wean drip as able    Left arm pain /angina / hs of CAD  -cardiac alert called, Car 96 % — — — SK    12/23/21 1330 — 108 13 90/76 98 % — — — SK    12/23/21 1315 — 103 22 124/89 98 % — — — SK    12/23/21 1310 — 112 18 — 98 % — — — SK    12/23/21 1248 97.8 °F (36.6 °C) — — 121/97 — — — — SK    12/23/21 1245 — 133 21 — 98 % 171 lb 11.8 oz No CAD/Cardiac Stents (Mid LAD +Distal RCA)   • CARPAL TUNNEL RELEASE Right 2007   • CATARACT EXTRACTION   1993   • CHOLECYSTECTOMY   2011   • EYE SURGERY   1981     vitrectomy L eye   • HAND/FINGER SURGERY UNLISTED         trigger finger relaeases on 3   • H 2.5-20 mg/hr, Intravenous, Continuous  •  apixaban (ELIQUIS) tab 5 mg, 5 mg, Oral, BID  •  pantoprazole (PROTONIX) EC tab 40 mg, 40 mg, Oral, BID AC  •  LORazepam (ATIVAN) tab 0.25 mg, 0.25 mg, Oral, PRN  •  prasugrel (Effient) tab 10 mg, 10 mg, Oral, Felisha Exam:  Blood pressure 100/71, pulse 50, temperature 98.3 °F (36.8 °C), temperature source Oral, resp. rate 16, weight 171 lb 11.8 oz (77.9 kg), SpO2 97 %.     General: Alert, orientated x3. Cooperative. No apparent distress.   HEENT:  EOMI, thyroid non-en

## 2021-12-27 NOTE — PAYOR COMM NOTE
--------------  DISCHARGE REVIEW    Payor: ADRIANNA FERNANDEZ  Subscriber #:  [id number]  Authorization Number: [id number]    Admit date: 12/23/21  Admit time:   3:42 PM  Discharge Date: 12/24/2021 10:37 AM     Admitting Physician: Rick Oliveros MD  Attending Physici fevers or chills, no headaches or vision changes, no other complaints. Currently has minimal to no pain.             Hospital Course:   Patient is a 59year old female with PMH sig for Factor 5, CAD s/p stents, Afib on eliquis, DM type 1 on insulin pump, s MG Cpdr  Commonly known as: PREVACID     LORazepam 0.5 MG Tabs  Commonly known as: ATIVAN     Magnesium 100 MG Tabs     mycophenolate sodium 180 MG Tbec  Commonly known as: MYFORTIC     Pentamidine Isethionate 50 mg/mL in Sterile Water for Injection     pr

## 2022-02-14 ENCOUNTER — LAB ENCOUNTER (OUTPATIENT)
Dept: LAB | Age: 62
End: 2022-02-14
Payer: COMMERCIAL

## 2022-02-14 DIAGNOSIS — Z94.0 RENAL TRANSPLANT RECIPIENT: ICD-10-CM

## 2022-02-14 DIAGNOSIS — Z79.899 HIGH RISK MEDICATION USE: ICD-10-CM

## 2022-02-14 DIAGNOSIS — I48.91 ATRIAL FIBRILLATION, NEW ONSET (HCC): ICD-10-CM

## 2022-02-14 DIAGNOSIS — I48.0 PAROXYSMAL ATRIAL FIBRILLATION (HCC): Primary | ICD-10-CM

## 2022-02-14 DIAGNOSIS — D84.9 IMMUNODEFICIENCY (HCC): ICD-10-CM

## 2022-02-14 LAB
ALBUMIN SERPL-MCNC: 4.1 G/DL (ref 3.4–5)
ALP LIVER SERPL-CCNC: 75 U/L
ALT SERPL-CCNC: 19 U/L
AST SERPL-CCNC: 12 U/L (ref 15–37)
BASOPHILS # BLD AUTO: 0.02 X10(3) UL (ref 0–0.2)
BASOPHILS NFR BLD AUTO: 0.2 %
BILIRUB DIRECT SERPL-MCNC: 0.3 MG/DL (ref 0–0.2)
BILIRUB SERPL-MCNC: 0.8 MG/DL (ref 0.1–2)
CREAT BLD-MCNC: 0.88 MG/DL
DEPRECATED RDW RBC AUTO: 43.7 FL (ref 35.1–46.3)
EOSINOPHIL # BLD AUTO: 0.12 X10(3) UL (ref 0–0.7)
EOSINOPHIL NFR BLD AUTO: 1.4 %
ERYTHROCYTE [DISTWIDTH] IN BLOOD BY AUTOMATED COUNT: 13.9 % (ref 11–15)
FASTING PATIENT GLUCOSE ANSWER: YES
GLUCOSE BLD-MCNC: 96 MG/DL (ref 70–99)
HCT VFR BLD AUTO: 39.8 %
HGB BLD-MCNC: 12.5 G/DL
IMM GRANULOCYTES # BLD AUTO: 0.06 X10(3) UL (ref 0–1)
IMM GRANULOCYTES NFR BLD: 0.7 %
LYMPHOCYTES # BLD AUTO: 2.98 X10(3) UL (ref 1–4)
LYMPHOCYTES NFR BLD AUTO: 34.5 %
MCH RBC QN AUTO: 27.7 PG (ref 26–34)
MCHC RBC AUTO-ENTMCNC: 31.4 G/DL (ref 31–37)
MCV RBC AUTO: 88.1 FL
MONOCYTES # BLD AUTO: 0.7 X10(3) UL (ref 0.1–1)
MONOCYTES NFR BLD AUTO: 8.1 %
NEUTROPHILS # BLD AUTO: 4.76 X10 (3) UL (ref 1.5–7.7)
NEUTROPHILS # BLD AUTO: 4.76 X10(3) UL (ref 1.5–7.7)
NEUTROPHILS NFR BLD AUTO: 55.1 %
PLATELET # BLD AUTO: 257 10(3)UL (ref 150–450)
POTASSIUM SERPL-SCNC: 4.1 MMOL/L (ref 3.5–5.1)
PROT SERPL-MCNC: 6.8 G/DL (ref 6.4–8.2)
RBC # BLD AUTO: 4.52 X10(6)UL
TSI SER-ACNC: 3.14 MIU/ML (ref 0.36–3.74)
WBC # BLD AUTO: 8.6 X10(3) UL (ref 4–11)

## 2022-02-14 PROCEDURE — 85025 COMPLETE CBC W/AUTO DIFF WBC: CPT

## 2022-02-14 PROCEDURE — 84443 ASSAY THYROID STIM HORMONE: CPT

## 2022-02-14 PROCEDURE — 84132 ASSAY OF SERUM POTASSIUM: CPT

## 2022-02-14 PROCEDURE — 36415 COLL VENOUS BLD VENIPUNCTURE: CPT

## 2022-02-14 PROCEDURE — 80076 HEPATIC FUNCTION PANEL: CPT

## 2022-02-14 PROCEDURE — 82947 ASSAY GLUCOSE BLOOD QUANT: CPT

## 2022-02-14 PROCEDURE — 82565 ASSAY OF CREATININE: CPT

## 2022-03-23 ENCOUNTER — LAB ENCOUNTER (OUTPATIENT)
Dept: LAB | Age: 62
End: 2022-03-23
Payer: COMMERCIAL

## 2022-03-23 DIAGNOSIS — Z79.899 HIGH RISK MEDICATION USE: ICD-10-CM

## 2022-03-23 DIAGNOSIS — Z94.0 RENAL TRANSPLANT RECIPIENT: ICD-10-CM

## 2022-03-23 DIAGNOSIS — D84.9 IMMUNODEFICIENCY (HCC): ICD-10-CM

## 2022-03-23 DIAGNOSIS — I48.91 ATRIAL FIBRILLATION, NEW ONSET (HCC): ICD-10-CM

## 2022-03-23 LAB
BASOPHILS # BLD AUTO: 0.03 X10(3) UL (ref 0–0.2)
BASOPHILS NFR BLD AUTO: 0.4 %
CREAT BLD-MCNC: 1.05 MG/DL
DEPRECATED RDW RBC AUTO: 45.8 FL (ref 35.1–46.3)
EOSINOPHIL # BLD AUTO: 0.15 X10(3) UL (ref 0–0.7)
EOSINOPHIL NFR BLD AUTO: 2 %
ERYTHROCYTE [DISTWIDTH] IN BLOOD BY AUTOMATED COUNT: 14.2 % (ref 11–15)
FASTING PATIENT GLUCOSE ANSWER: YES
GLUCOSE BLD-MCNC: 123 MG/DL (ref 70–99)
HCT VFR BLD AUTO: 39.1 %
IMM GRANULOCYTES # BLD AUTO: 0.03 X10(3) UL (ref 0–1)
IMM GRANULOCYTES NFR BLD: 0.4 %
LYMPHOCYTES # BLD AUTO: 2.08 X10(3) UL (ref 1–4)
LYMPHOCYTES NFR BLD AUTO: 27.6 %
MCH RBC QN AUTO: 27.8 PG (ref 26–34)
MCHC RBC AUTO-ENTMCNC: 31.2 G/DL (ref 31–37)
MCV RBC AUTO: 89.1 FL
MONOCYTES # BLD AUTO: 0.62 X10(3) UL (ref 0.1–1)
MONOCYTES NFR BLD AUTO: 8.2 %
NEUTROPHILS # BLD AUTO: 4.62 X10 (3) UL (ref 1.5–7.7)
NEUTROPHILS # BLD AUTO: 4.62 X10(3) UL (ref 1.5–7.7)
NEUTROPHILS NFR BLD AUTO: 61.4 %
PLATELET # BLD AUTO: 229 10(3)UL (ref 150–450)
POTASSIUM SERPL-SCNC: 4.7 MMOL/L (ref 3.5–5.1)
RBC # BLD AUTO: 4.39 X10(6)UL

## 2022-03-23 PROCEDURE — 85025 COMPLETE CBC W/AUTO DIFF WBC: CPT

## 2022-03-23 PROCEDURE — 82565 ASSAY OF CREATININE: CPT

## 2022-03-23 PROCEDURE — 36415 COLL VENOUS BLD VENIPUNCTURE: CPT

## 2022-03-23 PROCEDURE — 82947 ASSAY GLUCOSE BLOOD QUANT: CPT

## 2022-03-23 PROCEDURE — 84132 ASSAY OF SERUM POTASSIUM: CPT

## 2022-04-09 ENCOUNTER — LAB ENCOUNTER (OUTPATIENT)
Dept: LAB | Age: 62
End: 2022-04-09
Payer: COMMERCIAL

## 2022-04-09 DIAGNOSIS — Z94.0 RENAL TRANSPLANT RECIPIENT: ICD-10-CM

## 2022-04-09 DIAGNOSIS — D84.9 IMMUNODEFICIENCY (HCC): ICD-10-CM

## 2022-04-09 DIAGNOSIS — I48.91 ATRIAL FIBRILLATION, NEW ONSET (HCC): ICD-10-CM

## 2022-04-09 DIAGNOSIS — Z79.899 HIGH RISK MEDICATION USE: ICD-10-CM

## 2022-04-09 LAB
BASOPHILS # BLD AUTO: 0.02 X10(3) UL (ref 0–0.2)
BASOPHILS NFR BLD AUTO: 0.3 %
CREAT BLD-MCNC: 0.95 MG/DL
DEPRECATED RDW RBC AUTO: 47.8 FL (ref 35.1–46.3)
EOSINOPHIL # BLD AUTO: 0.26 X10(3) UL (ref 0–0.7)
EOSINOPHIL NFR BLD AUTO: 3.6 %
ERYTHROCYTE [DISTWIDTH] IN BLOOD BY AUTOMATED COUNT: 14.6 % (ref 11–15)
FASTING PATIENT GLUCOSE ANSWER: YES
GLUCOSE BLD-MCNC: 186 MG/DL (ref 70–99)
HCT VFR BLD AUTO: 37.3 %
HGB BLD-MCNC: 11.5 G/DL
IMM GRANULOCYTES # BLD AUTO: 0.03 X10(3) UL (ref 0–1)
IMM GRANULOCYTES NFR BLD: 0.4 %
LYMPHOCYTES # BLD AUTO: 1.59 X10(3) UL (ref 1–4)
LYMPHOCYTES NFR BLD AUTO: 21.9 %
MCH RBC QN AUTO: 28.2 PG (ref 26–34)
MCHC RBC AUTO-ENTMCNC: 30.8 G/DL (ref 31–37)
MCV RBC AUTO: 91.4 FL
MONOCYTES # BLD AUTO: 0.56 X10(3) UL (ref 0.1–1)
MONOCYTES NFR BLD AUTO: 7.7 %
NEUTROPHILS # BLD AUTO: 4.79 X10 (3) UL (ref 1.5–7.7)
NEUTROPHILS # BLD AUTO: 4.79 X10(3) UL (ref 1.5–7.7)
NEUTROPHILS NFR BLD AUTO: 66.1 %
PLATELET # BLD AUTO: 252 10(3)UL (ref 150–450)
POTASSIUM SERPL-SCNC: 4.7 MMOL/L (ref 3.5–5.1)
RBC # BLD AUTO: 4.08 X10(6)UL
WBC # BLD AUTO: 7.3 X10(3) UL (ref 4–11)

## 2022-04-09 PROCEDURE — 82947 ASSAY GLUCOSE BLOOD QUANT: CPT

## 2022-04-09 PROCEDURE — 36415 COLL VENOUS BLD VENIPUNCTURE: CPT

## 2022-04-09 PROCEDURE — 85025 COMPLETE CBC W/AUTO DIFF WBC: CPT

## 2022-04-09 PROCEDURE — 82565 ASSAY OF CREATININE: CPT

## 2022-04-09 PROCEDURE — 84132 ASSAY OF SERUM POTASSIUM: CPT

## 2022-04-29 ENCOUNTER — LAB ENCOUNTER (OUTPATIENT)
Dept: LAB | Age: 62
End: 2022-04-29
Payer: COMMERCIAL

## 2022-04-29 DIAGNOSIS — D84.9 IMMUNODEFICIENCY (HCC): ICD-10-CM

## 2022-04-29 DIAGNOSIS — I48.91 ATRIAL FIBRILLATION, NEW ONSET (HCC): ICD-10-CM

## 2022-04-29 DIAGNOSIS — Z79.899 HIGH RISK MEDICATION USE: ICD-10-CM

## 2022-04-29 DIAGNOSIS — Z94.0 KIDNEY REPLACED BY TRANSPLANT: Primary | ICD-10-CM

## 2022-04-29 DIAGNOSIS — Z94.0 RENAL TRANSPLANT RECIPIENT: ICD-10-CM

## 2022-04-29 LAB
BASOPHILS # BLD AUTO: 0.02 X10(3) UL (ref 0–0.2)
BASOPHILS NFR BLD AUTO: 0.3 %
CREAT BLD-MCNC: 0.96 MG/DL
DEPRECATED RDW RBC AUTO: 45.4 FL (ref 35.1–46.3)
EOSINOPHIL # BLD AUTO: 0.11 X10(3) UL (ref 0–0.7)
EOSINOPHIL NFR BLD AUTO: 1.5 %
ERYTHROCYTE [DISTWIDTH] IN BLOOD BY AUTOMATED COUNT: 14 % (ref 11–15)
FASTING PATIENT GLUCOSE ANSWER: YES
GLUCOSE BLD-MCNC: 165 MG/DL (ref 70–99)
HCT VFR BLD AUTO: 39.9 %
HGB BLD-MCNC: 12.5 G/DL
IMM GRANULOCYTES # BLD AUTO: 0.02 X10(3) UL (ref 0–1)
IMM GRANULOCYTES NFR BLD: 0.3 %
LYMPHOCYTES # BLD AUTO: 1.66 X10(3) UL (ref 1–4)
LYMPHOCYTES NFR BLD AUTO: 22.9 %
MCH RBC QN AUTO: 27.9 PG (ref 26–34)
MCHC RBC AUTO-ENTMCNC: 31.3 G/DL (ref 31–37)
MCV RBC AUTO: 89.1 FL
MONOCYTES # BLD AUTO: 0.69 X10(3) UL (ref 0.1–1)
MONOCYTES NFR BLD AUTO: 9.5 %
NEUTROPHILS # BLD AUTO: 4.75 X10 (3) UL (ref 1.5–7.7)
NEUTROPHILS # BLD AUTO: 4.75 X10(3) UL (ref 1.5–7.7)
NEUTROPHILS NFR BLD AUTO: 65.5 %
PLATELET # BLD AUTO: 250 10(3)UL (ref 150–450)
POTASSIUM SERPL-SCNC: 5.1 MMOL/L (ref 3.5–5.1)
RBC # BLD AUTO: 4.48 X10(6)UL
WBC # BLD AUTO: 7.3 X10(3) UL (ref 4–11)

## 2022-04-29 PROCEDURE — 82947 ASSAY GLUCOSE BLOOD QUANT: CPT

## 2022-04-29 PROCEDURE — 85025 COMPLETE CBC W/AUTO DIFF WBC: CPT

## 2022-04-29 PROCEDURE — 82565 ASSAY OF CREATININE: CPT

## 2022-04-29 PROCEDURE — 36415 COLL VENOUS BLD VENIPUNCTURE: CPT

## 2022-04-29 PROCEDURE — 84132 ASSAY OF SERUM POTASSIUM: CPT

## 2022-05-24 ENCOUNTER — LAB ENCOUNTER (OUTPATIENT)
Dept: LAB | Age: 62
End: 2022-05-24
Payer: COMMERCIAL

## 2022-05-24 DIAGNOSIS — Z79.899 HIGH RISK MEDICATION USE: ICD-10-CM

## 2022-05-24 DIAGNOSIS — I48.91 ATRIAL FIBRILLATION, NEW ONSET (HCC): ICD-10-CM

## 2022-05-24 DIAGNOSIS — D84.9 IMMUNODEFICIENCY (HCC): ICD-10-CM

## 2022-05-24 DIAGNOSIS — Z94.0 RENAL TRANSPLANT RECIPIENT: ICD-10-CM

## 2022-05-24 LAB
BASOPHILS # BLD AUTO: 0.01 X10(3) UL (ref 0–0.2)
BASOPHILS NFR BLD AUTO: 0.2 %
CREAT BLD-MCNC: 0.97 MG/DL
DEPRECATED RDW RBC AUTO: 43.1 FL (ref 35.1–46.3)
EOSINOPHIL # BLD AUTO: 0.06 X10(3) UL (ref 0–0.7)
EOSINOPHIL NFR BLD AUTO: 1.1 %
ERYTHROCYTE [DISTWIDTH] IN BLOOD BY AUTOMATED COUNT: 13.4 % (ref 11–15)
FASTING PATIENT GLUCOSE ANSWER: YES
GLUCOSE BLD-MCNC: 132 MG/DL (ref 70–99)
HCT VFR BLD AUTO: 39.7 %
HGB BLD-MCNC: 12.6 G/DL
IMM GRANULOCYTES # BLD AUTO: 0.01 X10(3) UL (ref 0–1)
IMM GRANULOCYTES NFR BLD: 0.2 %
LYMPHOCYTES # BLD AUTO: 1.69 X10(3) UL (ref 1–4)
LYMPHOCYTES NFR BLD AUTO: 30.5 %
MCH RBC QN AUTO: 27.8 PG (ref 26–34)
MCHC RBC AUTO-ENTMCNC: 31.7 G/DL (ref 31–37)
MCV RBC AUTO: 87.6 FL
MONOCYTES # BLD AUTO: 0.56 X10(3) UL (ref 0.1–1)
MONOCYTES NFR BLD AUTO: 10.1 %
NEUTROPHILS # BLD AUTO: 3.21 X10 (3) UL (ref 1.5–7.7)
NEUTROPHILS # BLD AUTO: 3.21 X10(3) UL (ref 1.5–7.7)
NEUTROPHILS NFR BLD AUTO: 57.9 %
PLATELET # BLD AUTO: 261 10(3)UL (ref 150–450)
POTASSIUM SERPL-SCNC: 4.9 MMOL/L (ref 3.5–5.1)
RBC # BLD AUTO: 4.53 X10(6)UL
WBC # BLD AUTO: 5.5 X10(3) UL (ref 4–11)

## 2022-05-24 PROCEDURE — 82947 ASSAY GLUCOSE BLOOD QUANT: CPT

## 2022-05-24 PROCEDURE — 82565 ASSAY OF CREATININE: CPT

## 2022-05-24 PROCEDURE — 85025 COMPLETE CBC W/AUTO DIFF WBC: CPT

## 2022-05-24 PROCEDURE — 84132 ASSAY OF SERUM POTASSIUM: CPT

## 2022-05-24 PROCEDURE — 36415 COLL VENOUS BLD VENIPUNCTURE: CPT

## 2022-06-17 ENCOUNTER — LAB ENCOUNTER (OUTPATIENT)
Dept: LAB | Age: 62
End: 2022-06-17
Payer: COMMERCIAL

## 2022-06-17 DIAGNOSIS — Z94.0 RENAL TRANSPLANT RECIPIENT: Primary | ICD-10-CM

## 2022-06-17 PROCEDURE — 36415 COLL VENOUS BLD VENIPUNCTURE: CPT

## 2022-07-16 ENCOUNTER — LAB ENCOUNTER (OUTPATIENT)
Dept: LAB | Facility: HOSPITAL | Age: 62
End: 2022-07-16
Attending: INTERNAL MEDICINE
Payer: COMMERCIAL

## 2022-07-16 DIAGNOSIS — R00.2 PALPITATIONS: ICD-10-CM

## 2022-07-16 DIAGNOSIS — E10.29: ICD-10-CM

## 2022-07-16 DIAGNOSIS — D64.9 ANEMIA, UNSPECIFIED: ICD-10-CM

## 2022-07-16 DIAGNOSIS — M62.81 MUSCLE WEAKNESS: Primary | ICD-10-CM

## 2022-07-16 LAB
ALBUMIN SERPL-MCNC: 3.7 G/DL (ref 3.4–5)
ALBUMIN/GLOB SERPL: 1.2 {RATIO} (ref 1–2)
ALP LIVER SERPL-CCNC: 79 U/L
ALT SERPL-CCNC: 25 U/L
ANION GAP SERPL CALC-SCNC: 8 MMOL/L (ref 0–18)
AST SERPL-CCNC: 19 U/L (ref 15–37)
BASOPHILS # BLD AUTO: 0.01 X10(3) UL (ref 0–0.2)
BASOPHILS NFR BLD AUTO: 0.1 %
BILIRUB SERPL-MCNC: 0.8 MG/DL (ref 0.1–2)
BUN BLD-MCNC: 12 MG/DL (ref 7–18)
BUN/CREAT SERPL: 12.8 (ref 10–20)
CALCIUM BLD-MCNC: 9.3 MG/DL (ref 8.5–10.1)
CHLORIDE SERPL-SCNC: 102 MMOL/L (ref 98–112)
CK SERPL-CCNC: 48 U/L
CO2 SERPL-SCNC: 25 MMOL/L (ref 21–32)
CREAT BLD-MCNC: 0.94 MG/DL
CRP SERPL-MCNC: <0.29 MG/DL (ref ?–0.3)
DEPRECATED RDW RBC AUTO: 43.8 FL (ref 35.1–46.3)
EOSINOPHIL # BLD AUTO: 0.07 X10(3) UL (ref 0–0.7)
EOSINOPHIL NFR BLD AUTO: 1 %
ERYTHROCYTE [DISTWIDTH] IN BLOOD BY AUTOMATED COUNT: 13.9 % (ref 11–15)
ERYTHROCYTE [SEDIMENTATION RATE] IN BLOOD: 3 MM/HR
EST. AVERAGE GLUCOSE BLD GHB EST-MCNC: 163 MG/DL (ref 68–126)
FASTING STATUS PATIENT QL REPORTED: YES
GLOBULIN PLAS-MCNC: 3 G/DL (ref 2.8–4.4)
GLUCOSE BLD-MCNC: 144 MG/DL (ref 70–99)
HBA1C MFR BLD: 7.3 % (ref ?–5.7)
HCT VFR BLD AUTO: 43.1 %
HGB BLD-MCNC: 13.3 G/DL
IMM GRANULOCYTES # BLD AUTO: 0.02 X10(3) UL (ref 0–1)
IMM GRANULOCYTES NFR BLD: 0.3 %
LDH SERPL L TO P-CCNC: 187 U/L
LYMPHOCYTES # BLD AUTO: 1.93 X10(3) UL (ref 1–4)
LYMPHOCYTES NFR BLD AUTO: 28.8 %
MCH RBC QN AUTO: 27 PG (ref 26–34)
MCHC RBC AUTO-ENTMCNC: 30.9 G/DL (ref 31–37)
MCV RBC AUTO: 87.4 FL
MONOCYTES # BLD AUTO: 0.62 X10(3) UL (ref 0.1–1)
MONOCYTES NFR BLD AUTO: 9.3 %
NEUTROPHILS # BLD AUTO: 4.04 X10 (3) UL (ref 1.5–7.7)
NEUTROPHILS # BLD AUTO: 4.04 X10(3) UL (ref 1.5–7.7)
NEUTROPHILS NFR BLD AUTO: 60.5 %
OSMOLALITY SERPL CALC.SUM OF ELEC: 282 MOSM/KG (ref 275–295)
PLATELET # BLD AUTO: 265 10(3)UL (ref 150–450)
POTASSIUM SERPL-SCNC: 4.5 MMOL/L (ref 3.5–5.1)
PROT SERPL-MCNC: 6.7 G/DL (ref 6.4–8.2)
RBC # BLD AUTO: 4.93 X10(6)UL
SODIUM SERPL-SCNC: 135 MMOL/L (ref 136–145)
T3FREE SERPL-MCNC: 2 PG/ML (ref 2.4–4.2)
T4 FREE SERPL-MCNC: 1.5 NG/DL (ref 0.8–1.7)
TSI SER-ACNC: 1.85 MIU/ML (ref 0.36–3.74)
WBC # BLD AUTO: 6.7 X10(3) UL (ref 4–11)

## 2022-07-16 PROCEDURE — 83615 LACTATE (LD) (LDH) ENZYME: CPT

## 2022-07-16 PROCEDURE — 84439 ASSAY OF FREE THYROXINE: CPT

## 2022-07-16 PROCEDURE — 83036 HEMOGLOBIN GLYCOSYLATED A1C: CPT

## 2022-07-16 PROCEDURE — 82550 ASSAY OF CK (CPK): CPT

## 2022-07-16 PROCEDURE — 36415 COLL VENOUS BLD VENIPUNCTURE: CPT

## 2022-07-16 PROCEDURE — 84443 ASSAY THYROID STIM HORMONE: CPT

## 2022-07-16 PROCEDURE — 84481 FREE ASSAY (FT-3): CPT

## 2022-07-16 PROCEDURE — 85025 COMPLETE CBC W/AUTO DIFF WBC: CPT

## 2022-07-16 PROCEDURE — 80053 COMPREHEN METABOLIC PANEL: CPT

## 2022-07-16 PROCEDURE — 86140 C-REACTIVE PROTEIN: CPT

## 2022-07-16 PROCEDURE — 85652 RBC SED RATE AUTOMATED: CPT

## 2022-07-19 ENCOUNTER — EKG ENCOUNTER (OUTPATIENT)
Dept: LAB | Facility: HOSPITAL | Age: 62
End: 2022-07-19
Attending: INTERNAL MEDICINE
Payer: COMMERCIAL

## 2022-07-19 DIAGNOSIS — I48.0 PAROXYSMAL ATRIAL FIBRILLATION (HCC): Primary | ICD-10-CM

## 2022-07-19 PROCEDURE — 93005 ELECTROCARDIOGRAM TRACING: CPT

## 2022-07-19 PROCEDURE — 93010 ELECTROCARDIOGRAM REPORT: CPT | Performed by: INTERNAL MEDICINE

## 2022-09-19 ENCOUNTER — LAB ENCOUNTER (OUTPATIENT)
Dept: LAB | Age: 62
End: 2022-09-19

## 2022-09-19 DIAGNOSIS — Z79.899 HIGH RISK MEDICATION USE: ICD-10-CM

## 2022-09-19 DIAGNOSIS — Z94.0 RENAL TRANSPLANT RECIPIENT: Primary | ICD-10-CM

## 2022-09-19 DIAGNOSIS — D84.9 IMMUNODEFICIENCY (HCC): ICD-10-CM

## 2022-09-19 LAB
BASOPHILS # BLD AUTO: 0.02 X10(3) UL (ref 0–0.2)
BASOPHILS NFR BLD AUTO: 0.2 %
CREAT BLD-MCNC: 0.83 MG/DL
DEPRECATED RDW RBC AUTO: 42.6 FL (ref 35.1–46.3)
EOSINOPHIL # BLD AUTO: 0.51 X10(3) UL (ref 0–0.7)
EOSINOPHIL NFR BLD AUTO: 6 %
ERYTHROCYTE [DISTWIDTH] IN BLOOD BY AUTOMATED COUNT: 13.3 % (ref 11–15)
FASTING PATIENT GLUCOSE ANSWER: YES
GFR SERPLBLD BASED ON 1.73 SQ M-ARVRAT: 79 ML/MIN/1.73M2 (ref 60–?)
GLUCOSE BLD-MCNC: 109 MG/DL (ref 70–99)
HCT VFR BLD AUTO: 35.4 %
HGB BLD-MCNC: 10.9 G/DL
IMM GRANULOCYTES # BLD AUTO: 0.04 X10(3) UL (ref 0–1)
IMM GRANULOCYTES NFR BLD: 0.5 %
LYMPHOCYTES # BLD AUTO: 0.92 X10(3) UL (ref 1–4)
LYMPHOCYTES NFR BLD AUTO: 10.7 %
MCH RBC QN AUTO: 26.9 PG (ref 26–34)
MCHC RBC AUTO-ENTMCNC: 30.8 G/DL (ref 31–37)
MCV RBC AUTO: 87.4 FL
MONOCYTES # BLD AUTO: 0.95 X10(3) UL (ref 0.1–1)
MONOCYTES NFR BLD AUTO: 11.1 %
NEUTROPHILS # BLD AUTO: 6.12 X10 (3) UL (ref 1.5–7.7)
NEUTROPHILS # BLD AUTO: 6.12 X10(3) UL (ref 1.5–7.7)
NEUTROPHILS NFR BLD AUTO: 71.5 %
PLATELET # BLD AUTO: 389 10(3)UL (ref 150–450)
POTASSIUM SERPL-SCNC: 4.7 MMOL/L (ref 3.5–5.1)
RBC # BLD AUTO: 4.05 X10(6)UL
WBC # BLD AUTO: 8.6 X10(3) UL (ref 4–11)

## 2022-09-19 PROCEDURE — 84132 ASSAY OF SERUM POTASSIUM: CPT

## 2022-09-19 PROCEDURE — 85025 COMPLETE CBC W/AUTO DIFF WBC: CPT

## 2022-09-19 PROCEDURE — 36415 COLL VENOUS BLD VENIPUNCTURE: CPT

## 2022-09-19 PROCEDURE — 82565 ASSAY OF CREATININE: CPT

## 2022-09-19 PROCEDURE — 82947 ASSAY GLUCOSE BLOOD QUANT: CPT

## 2022-09-22 ENCOUNTER — LAB ENCOUNTER (OUTPATIENT)
Dept: LAB | Age: 62
End: 2022-09-22
Attending: INTERNAL MEDICINE

## 2022-09-22 ENCOUNTER — HOSPITAL ENCOUNTER (OUTPATIENT)
Dept: GENERAL RADIOLOGY | Age: 62
Discharge: HOME | End: 2022-09-22
Attending: INTERNAL MEDICINE

## 2022-09-22 DIAGNOSIS — E11.9 DM (DIABETES MELLITUS) (HCC): ICD-10-CM

## 2022-09-22 DIAGNOSIS — Z94.0 RENAL TRANSPLANT RECIPIENT: ICD-10-CM

## 2022-09-22 DIAGNOSIS — R05.9 NEW ONSET COUGH: ICD-10-CM

## 2022-09-22 DIAGNOSIS — R30.0 DYSURIA: Primary | ICD-10-CM

## 2022-09-22 DIAGNOSIS — R05.9 COUGH, UNSPECIFIED TYPE: ICD-10-CM

## 2022-09-22 DIAGNOSIS — D84.9 IMMUNOSUPPRESSION (HCC): ICD-10-CM

## 2022-09-22 LAB
ALBUMIN SERPL-MCNC: 3 G/DL (ref 3.4–5)
ALBUMIN/GLOB SERPL: 0.8 {RATIO} (ref 1–2)
ALP LIVER SERPL-CCNC: 104 U/L
ALT SERPL-CCNC: 13 U/L
ANION GAP SERPL CALC-SCNC: 9 MMOL/L (ref 0–18)
AST SERPL-CCNC: 5 U/L (ref 15–37)
BASOPHILS # BLD AUTO: 0.02 X10(3) UL (ref 0–0.2)
BASOPHILS NFR BLD AUTO: 0.2 %
BILIRUB SERPL-MCNC: 1 MG/DL (ref 0.1–2)
BILIRUB UR QL: NEGATIVE
BUN BLD-MCNC: 10 MG/DL (ref 7–18)
BUN/CREAT SERPL: 11.9 (ref 10–20)
CALCIUM BLD-MCNC: 9.3 MG/DL (ref 8.5–10.1)
CHLORIDE SERPL-SCNC: 100 MMOL/L (ref 98–112)
CLARITY UR: CLEAR
CO2 SERPL-SCNC: 23 MMOL/L (ref 21–32)
COLOR UR: YELLOW
CREAT BLD-MCNC: 0.84 MG/DL
CRP SERPL-MCNC: 2.44 MG/DL (ref ?–0.3)
DEPRECATED RDW RBC AUTO: 41.4 FL (ref 35.1–46.3)
EOSINOPHIL # BLD AUTO: 0.16 X10(3) UL (ref 0–0.7)
EOSINOPHIL NFR BLD AUTO: 1.6 %
ERYTHROCYTE [DISTWIDTH] IN BLOOD BY AUTOMATED COUNT: 13.4 % (ref 11–15)
ERYTHROCYTE [SEDIMENTATION RATE] IN BLOOD: 41 MM/HR
FASTING STATUS PATIENT QL REPORTED: NO
GFR SERPLBLD BASED ON 1.73 SQ M-ARVRAT: 78 ML/MIN/1.73M2 (ref 60–?)
GLOBULIN PLAS-MCNC: 3.6 G/DL (ref 2.8–4.4)
GLUCOSE BLD-MCNC: 198 MG/DL (ref 70–99)
GLUCOSE UR-MCNC: NEGATIVE MG/DL
HCT VFR BLD AUTO: 34.5 %
HGB BLD-MCNC: 10.8 G/DL
IMM GRANULOCYTES # BLD AUTO: 0.04 X10(3) UL (ref 0–1)
IMM GRANULOCYTES NFR BLD: 0.4 %
KETONES UR-MCNC: NEGATIVE MG/DL
LDH SERPL L TO P-CCNC: 185 U/L
LEUKOCYTE ESTERASE UR QL STRIP.AUTO: NEGATIVE
LYMPHOCYTES # BLD AUTO: 0.92 X10(3) UL (ref 1–4)
LYMPHOCYTES NFR BLD AUTO: 9.4 %
MCH RBC QN AUTO: 26.7 PG (ref 26–34)
MCHC RBC AUTO-ENTMCNC: 31.3 G/DL (ref 31–37)
MCV RBC AUTO: 85.4 FL
MONOCYTES # BLD AUTO: 0.58 X10(3) UL (ref 0.1–1)
MONOCYTES NFR BLD AUTO: 5.9 %
NEUTROPHILS # BLD AUTO: 8.08 X10 (3) UL (ref 1.5–7.7)
NEUTROPHILS # BLD AUTO: 8.08 X10(3) UL (ref 1.5–7.7)
NEUTROPHILS NFR BLD AUTO: 82.5 %
NITRITE UR QL STRIP.AUTO: NEGATIVE
OSMOLALITY SERPL CALC.SUM OF ELEC: 279 MOSM/KG (ref 275–295)
PH UR: 7 [PH] (ref 5–8)
PLATELET # BLD AUTO: 407 10(3)UL (ref 150–450)
POTASSIUM SERPL-SCNC: 4.6 MMOL/L (ref 3.5–5.1)
PROT SERPL-MCNC: 6.6 G/DL (ref 6.4–8.2)
PROT UR-MCNC: NEGATIVE MG/DL
RBC # BLD AUTO: 4.04 X10(6)UL
SODIUM SERPL-SCNC: 132 MMOL/L (ref 136–145)
SP GR UR STRIP: 1.01 (ref 1–1.03)
UROBILINOGEN UR STRIP-ACNC: 0.2
WBC # BLD AUTO: 9.8 X10(3) UL (ref 4–11)

## 2022-09-22 PROCEDURE — 81015 MICROSCOPIC EXAM OF URINE: CPT

## 2022-09-22 PROCEDURE — 85652 RBC SED RATE AUTOMATED: CPT

## 2022-09-22 PROCEDURE — 87086 URINE CULTURE/COLONY COUNT: CPT

## 2022-09-22 PROCEDURE — 71046 X-RAY EXAM CHEST 2 VIEWS: CPT | Performed by: INTERNAL MEDICINE

## 2022-09-22 PROCEDURE — 81001 URINALYSIS AUTO W/SCOPE: CPT

## 2022-09-22 PROCEDURE — 86140 C-REACTIVE PROTEIN: CPT

## 2022-09-22 PROCEDURE — 85025 COMPLETE CBC W/AUTO DIFF WBC: CPT

## 2022-09-22 PROCEDURE — 80053 COMPREHEN METABOLIC PANEL: CPT

## 2022-09-22 PROCEDURE — 36415 COLL VENOUS BLD VENIPUNCTURE: CPT

## 2022-09-22 PROCEDURE — 83615 LACTATE (LD) (LDH) ENZYME: CPT

## 2022-10-13 ENCOUNTER — IMMUNIZATION (OUTPATIENT)
Dept: LAB | Age: 62
End: 2022-10-13
Attending: EMERGENCY MEDICINE
Payer: COMMERCIAL

## 2022-10-13 DIAGNOSIS — Z23 NEED FOR VACCINATION: Primary | ICD-10-CM

## 2022-10-13 PROCEDURE — 0134A SARSCOV2 VAC BVL 50MCG/0.5ML: CPT

## 2022-10-18 ENCOUNTER — IMMUNIZATION (OUTPATIENT)
Dept: LAB | Age: 62
End: 2022-10-18
Attending: EMERGENCY MEDICINE
Payer: COMMERCIAL

## 2022-10-18 DIAGNOSIS — Z23 NEED FOR VACCINATION: Primary | ICD-10-CM

## 2022-10-18 PROCEDURE — 90471 IMMUNIZATION ADMIN: CPT

## 2022-10-18 PROCEDURE — 90686 IIV4 VACC NO PRSV 0.5 ML IM: CPT

## 2022-11-14 ENCOUNTER — HOSPITAL ENCOUNTER (OUTPATIENT)
Dept: GENERAL RADIOLOGY | Age: 62
Discharge: HOME | End: 2022-11-14
Attending: INTERNAL MEDICINE
Payer: COMMERCIAL

## 2022-11-14 DIAGNOSIS — Z51.81 ENCOUNTER FOR THERAPEUTIC DRUG MONITORING: ICD-10-CM

## 2022-11-14 PROCEDURE — 71046 X-RAY EXAM CHEST 2 VIEWS: CPT | Performed by: INTERNAL MEDICINE

## 2023-01-06 ENCOUNTER — LAB ENCOUNTER (OUTPATIENT)
Dept: LAB | Age: 63
End: 2023-01-06
Attending: NURSE PRACTITIONER
Payer: COMMERCIAL

## 2023-01-06 DIAGNOSIS — Z94.0 RENAL TRANSPLANT RECIPIENT: ICD-10-CM

## 2023-01-06 DIAGNOSIS — Z79.899 HIGH RISK MEDICATION USE: ICD-10-CM

## 2023-01-06 DIAGNOSIS — D84.9 IMMUNODEFICIENCY (HCC): ICD-10-CM

## 2023-01-06 LAB
BASOPHILS # BLD AUTO: 0.01 X10(3) UL (ref 0–0.2)
BASOPHILS NFR BLD AUTO: 0.2 %
CREAT BLD-MCNC: 0.94 MG/DL
DEPRECATED RDW RBC AUTO: 45.7 FL (ref 35.1–46.3)
EOSINOPHIL # BLD AUTO: 0.09 X10(3) UL (ref 0–0.7)
EOSINOPHIL NFR BLD AUTO: 1.5 %
ERYTHROCYTE [DISTWIDTH] IN BLOOD BY AUTOMATED COUNT: 14.6 % (ref 11–15)
FASTING PATIENT GLUCOSE ANSWER: YES
GFR SERPLBLD BASED ON 1.73 SQ M-ARVRAT: 68 ML/MIN/1.73M2 (ref 60–?)
GLUCOSE BLD-MCNC: 145 MG/DL (ref 70–99)
HCT VFR BLD AUTO: 41.6 %
HGB BLD-MCNC: 13.5 G/DL
IMM GRANULOCYTES # BLD AUTO: 0.02 X10(3) UL (ref 0–1)
IMM GRANULOCYTES NFR BLD: 0.3 %
LYMPHOCYTES # BLD AUTO: 1.56 X10(3) UL (ref 1–4)
LYMPHOCYTES NFR BLD AUTO: 25.3 %
MCH RBC QN AUTO: 27.8 PG (ref 26–34)
MCHC RBC AUTO-ENTMCNC: 32.5 G/DL (ref 31–37)
MCV RBC AUTO: 85.6 FL
MONOCYTES # BLD AUTO: 0.56 X10(3) UL (ref 0.1–1)
MONOCYTES NFR BLD AUTO: 9.1 %
NEUTROPHILS # BLD AUTO: 3.92 X10 (3) UL (ref 1.5–7.7)
NEUTROPHILS # BLD AUTO: 3.92 X10(3) UL (ref 1.5–7.7)
NEUTROPHILS NFR BLD AUTO: 63.6 %
PLATELET # BLD AUTO: 241 10(3)UL (ref 150–450)
POTASSIUM SERPL-SCNC: 4.7 MMOL/L (ref 3.5–5.1)
RBC # BLD AUTO: 4.86 X10(6)UL
WBC # BLD AUTO: 6.2 X10(3) UL (ref 4–11)

## 2023-01-06 PROCEDURE — 82565 ASSAY OF CREATININE: CPT

## 2023-01-06 PROCEDURE — 84132 ASSAY OF SERUM POTASSIUM: CPT

## 2023-01-06 PROCEDURE — 82947 ASSAY GLUCOSE BLOOD QUANT: CPT

## 2023-01-06 PROCEDURE — 85025 COMPLETE CBC W/AUTO DIFF WBC: CPT

## 2023-01-06 PROCEDURE — 36415 COLL VENOUS BLD VENIPUNCTURE: CPT

## 2023-02-02 ENCOUNTER — LAB ENCOUNTER (OUTPATIENT)
Dept: LAB | Age: 63
End: 2023-02-02
Attending: NURSE PRACTITIONER
Payer: COMMERCIAL

## 2023-02-02 DIAGNOSIS — Z94.0 RENAL TRANSPLANT RECIPIENT: ICD-10-CM

## 2023-02-02 DIAGNOSIS — D84.9 IMMUNODEFICIENCY (HCC): ICD-10-CM

## 2023-02-02 DIAGNOSIS — Z79.899 HIGH RISK MEDICATION USE: ICD-10-CM

## 2023-02-02 LAB
BASOPHILS # BLD AUTO: 0.01 X10(3) UL (ref 0–0.2)
BASOPHILS NFR BLD AUTO: 0.2 %
CREAT BLD-MCNC: 1.03 MG/DL
DEPRECATED RDW RBC AUTO: 45.3 FL (ref 35.1–46.3)
EOSINOPHIL # BLD AUTO: 0.07 X10(3) UL (ref 0–0.7)
EOSINOPHIL NFR BLD AUTO: 1.2 %
ERYTHROCYTE [DISTWIDTH] IN BLOOD BY AUTOMATED COUNT: 14.5 % (ref 11–15)
FASTING PATIENT GLUCOSE ANSWER: YES
GFR SERPLBLD BASED ON 1.73 SQ M-ARVRAT: 61 ML/MIN/1.73M2 (ref 60–?)
GLUCOSE BLD-MCNC: 214 MG/DL (ref 70–99)
HCT VFR BLD AUTO: 40.4 %
HGB BLD-MCNC: 12.9 G/DL
IMM GRANULOCYTES # BLD AUTO: 0.01 X10(3) UL (ref 0–1)
IMM GRANULOCYTES NFR BLD: 0.2 %
LYMPHOCYTES # BLD AUTO: 1.24 X10(3) UL (ref 1–4)
LYMPHOCYTES NFR BLD AUTO: 20.8 %
MCH RBC QN AUTO: 28 PG (ref 26–34)
MCHC RBC AUTO-ENTMCNC: 31.9 G/DL (ref 31–37)
MCV RBC AUTO: 87.6 FL
MONOCYTES # BLD AUTO: 0.55 X10(3) UL (ref 0.1–1)
MONOCYTES NFR BLD AUTO: 9.2 %
NEUTROPHILS # BLD AUTO: 4.08 X10 (3) UL (ref 1.5–7.7)
NEUTROPHILS # BLD AUTO: 4.08 X10(3) UL (ref 1.5–7.7)
NEUTROPHILS NFR BLD AUTO: 68.4 %
PLATELET # BLD AUTO: 230 10(3)UL (ref 150–450)
POTASSIUM SERPL-SCNC: 5.1 MMOL/L (ref 3.5–5.1)
RBC # BLD AUTO: 4.61 X10(6)UL
WBC # BLD AUTO: 6 X10(3) UL (ref 4–11)

## 2023-02-02 PROCEDURE — 84132 ASSAY OF SERUM POTASSIUM: CPT

## 2023-02-02 PROCEDURE — 85025 COMPLETE CBC W/AUTO DIFF WBC: CPT

## 2023-02-02 PROCEDURE — 82565 ASSAY OF CREATININE: CPT

## 2023-02-02 PROCEDURE — 36415 COLL VENOUS BLD VENIPUNCTURE: CPT

## 2023-02-02 PROCEDURE — 82947 ASSAY GLUCOSE BLOOD QUANT: CPT

## 2023-04-14 ENCOUNTER — LAB ENCOUNTER (OUTPATIENT)
Dept: LAB | Age: 63
End: 2023-04-14
Attending: NURSE PRACTITIONER
Payer: COMMERCIAL

## 2023-04-14 DIAGNOSIS — Z94.0 RENAL TRANSPLANT RECIPIENT: ICD-10-CM

## 2023-04-14 DIAGNOSIS — Z79.899 HIGH RISK MEDICATION USE: ICD-10-CM

## 2023-04-14 DIAGNOSIS — D84.9 IMMUNODEFICIENCY (HCC): ICD-10-CM

## 2023-04-14 LAB
BASOPHILS # BLD AUTO: 0.01 X10(3) UL (ref 0–0.2)
BASOPHILS NFR BLD AUTO: 0.2 %
CREAT BLD-MCNC: 0.89 MG/DL
DEPRECATED RDW RBC AUTO: 43.8 FL (ref 35.1–46.3)
EOSINOPHIL # BLD AUTO: 0.11 X10(3) UL (ref 0–0.7)
EOSINOPHIL NFR BLD AUTO: 1.7 %
ERYTHROCYTE [DISTWIDTH] IN BLOOD BY AUTOMATED COUNT: 14 % (ref 11–15)
FASTING PATIENT GLUCOSE ANSWER: YES
GFR SERPLBLD BASED ON 1.73 SQ M-ARVRAT: 72 ML/MIN/1.73M2 (ref 60–?)
GLUCOSE BLD-MCNC: 231 MG/DL (ref 70–99)
HCT VFR BLD AUTO: 38.7 %
HGB BLD-MCNC: 12.4 G/DL
IMM GRANULOCYTES # BLD AUTO: 0.02 X10(3) UL (ref 0–1)
IMM GRANULOCYTES NFR BLD: 0.3 %
LYMPHOCYTES # BLD AUTO: 1.08 X10(3) UL (ref 1–4)
LYMPHOCYTES NFR BLD AUTO: 16.6 %
MCH RBC QN AUTO: 27.7 PG (ref 26–34)
MCHC RBC AUTO-ENTMCNC: 32 G/DL (ref 31–37)
MCV RBC AUTO: 86.6 FL
MONOCYTES # BLD AUTO: 0.57 X10(3) UL (ref 0.1–1)
MONOCYTES NFR BLD AUTO: 8.8 %
NEUTROPHILS # BLD AUTO: 4.72 X10 (3) UL (ref 1.5–7.7)
NEUTROPHILS # BLD AUTO: 4.72 X10(3) UL (ref 1.5–7.7)
NEUTROPHILS NFR BLD AUTO: 72.4 %
PLATELET # BLD AUTO: 231 10(3)UL (ref 150–450)
POTASSIUM SERPL-SCNC: 4.7 MMOL/L (ref 3.5–5.1)
RBC # BLD AUTO: 4.47 X10(6)UL
WBC # BLD AUTO: 6.5 X10(3) UL (ref 4–11)

## 2023-04-14 PROCEDURE — 84132 ASSAY OF SERUM POTASSIUM: CPT

## 2023-04-14 PROCEDURE — 82565 ASSAY OF CREATININE: CPT

## 2023-04-14 PROCEDURE — 82947 ASSAY GLUCOSE BLOOD QUANT: CPT

## 2023-04-14 PROCEDURE — 36415 COLL VENOUS BLD VENIPUNCTURE: CPT

## 2023-07-01 ENCOUNTER — APPOINTMENT (OUTPATIENT)
Dept: GENERAL RADIOLOGY | Facility: HOSPITAL | Age: 63
End: 2023-07-01
Payer: COMMERCIAL

## 2023-07-01 ENCOUNTER — HOSPITAL ENCOUNTER (EMERGENCY)
Facility: HOSPITAL | Age: 63
Discharge: HOME | End: 2023-07-01
Payer: COMMERCIAL

## 2023-07-01 VITALS
HEART RATE: 56 BPM | TEMPERATURE: 99 F | OXYGEN SATURATION: 100 % | BODY MASS INDEX: 23.56 KG/M2 | SYSTOLIC BLOOD PRESSURE: 108 MMHG | WEIGHT: 138 LBS | HEIGHT: 64 IN | DIASTOLIC BLOOD PRESSURE: 91 MMHG | RESPIRATION RATE: 18 BRPM

## 2023-07-01 DIAGNOSIS — S92.345A NONDISPLACED FRACTURE OF FOURTH METATARSAL BONE, LEFT FOOT, INITIAL ENCOUNTER FOR CLOSED FRACTURE: Primary | ICD-10-CM

## 2023-07-01 DIAGNOSIS — S89.91XA INJURY OF RIGHT KNEE, INITIAL ENCOUNTER: ICD-10-CM

## 2023-07-01 PROCEDURE — 99284 EMERGENCY DEPT VISIT MOD MDM: CPT

## 2023-07-01 PROCEDURE — 73630 X-RAY EXAM OF FOOT: CPT

## 2023-07-01 PROCEDURE — 73560 X-RAY EXAM OF KNEE 1 OR 2: CPT

## 2023-07-01 PROCEDURE — 28470 CLTX METATARSAL FX WO MNP EA: CPT

## 2023-07-01 RX ORDER — ACETAMINOPHEN 500 MG
1000 TABLET ORAL ONCE
Status: COMPLETED | OUTPATIENT
Start: 2023-07-01 | End: 2023-07-01

## 2023-07-01 NOTE — ED QUICK NOTES
Pt to ED for a mechanical fall this am. Pt states she was getting out of bed and slipped, hitting her right knee and left foot. Denies dizziness, lightheadedness, LOC, hitting head, HA. C/o right knee pain and left outer foot pain. Increased pain when bearing weight. Swelling and bruising noted to the right knee.

## 2023-07-01 NOTE — CM/SW NOTE
Spoke to the pt and son and answered their questions pt will speak to her pcp regarding [place] in the future.

## 2023-07-01 NOTE — ED INITIAL ASSESSMENT (HPI)
Pt presents to the ER via EMS s/p fall at 8am today. Pt reports slipping out of bed. Denies LOC or hitting her head. Pt on eliquis for AFIB. C/o left foot and right knee pain.

## 2023-11-10 RX ORDER — SODIUM CHLORIDE 9 MG/ML
3 INJECTION, SOLUTION INTRAVENOUS
Status: CANCELLED | OUTPATIENT
Start: 2023-11-11 | End: 2023-11-11

## 2023-12-04 ENCOUNTER — EKG ENCOUNTER (OUTPATIENT)
Dept: LAB | Age: 63
End: 2023-12-04
Attending: INTERNAL MEDICINE
Payer: COMMERCIAL

## 2023-12-04 ENCOUNTER — HOSPITAL ENCOUNTER (OUTPATIENT)
Dept: GENERAL RADIOLOGY | Age: 63
Discharge: HOME | End: 2023-12-04
Attending: INTERNAL MEDICINE
Payer: COMMERCIAL

## 2023-12-04 DIAGNOSIS — Z01.818 PREOP TESTING: Primary | ICD-10-CM

## 2023-12-04 DIAGNOSIS — Z01.818 PREOP TESTING: ICD-10-CM

## 2023-12-04 LAB
ATRIAL RATE: 57 BPM
P AXIS: 64 DEGREES
P-R INTERVAL: 172 MS
Q-T INTERVAL: 500 MS
QRS DURATION: 140 MS
QTC CALCULATION (BEZET): 486 MS
R AXIS: -59 DEGREES
T AXIS: 99 DEGREES
VENTRICULAR RATE: 57 BPM

## 2023-12-04 PROCEDURE — 93005 ELECTROCARDIOGRAM TRACING: CPT

## 2023-12-04 PROCEDURE — 71046 X-RAY EXAM CHEST 2 VIEWS: CPT | Performed by: INTERNAL MEDICINE

## 2023-12-04 PROCEDURE — 93010 ELECTROCARDIOGRAM REPORT: CPT | Performed by: INTERNAL MEDICINE

## 2023-12-08 ENCOUNTER — LAB ENCOUNTER (OUTPATIENT)
Dept: LAB | Age: 63
End: 2023-12-08
Attending: NURSE PRACTITIONER
Payer: COMMERCIAL

## 2023-12-08 DIAGNOSIS — D84.9 IMMUNODEFICIENCY (HCC): Primary | ICD-10-CM

## 2023-12-08 DIAGNOSIS — Z94.0 HISTORY OF RENAL TRANSPLANT: ICD-10-CM

## 2023-12-08 DIAGNOSIS — Z79.899 HIGH RISK MEDICATION USE: ICD-10-CM

## 2023-12-08 DIAGNOSIS — Z01.818 PREOPERATIVE CLEARANCE: ICD-10-CM

## 2023-12-08 LAB
ANION GAP SERPL CALC-SCNC: 11 MMOL/L (ref 0–18)
BASOPHILS # BLD AUTO: 0.02 X10(3) UL (ref 0–0.2)
BASOPHILS NFR BLD AUTO: 0.2 %
BUN BLD-MCNC: 15 MG/DL (ref 9–23)
BUN/CREAT SERPL: 15.2 (ref 10–20)
CALCIUM BLD-MCNC: 9.1 MG/DL (ref 8.7–10.4)
CHLORIDE SERPL-SCNC: 99 MMOL/L (ref 98–112)
CO2 SERPL-SCNC: 23 MMOL/L (ref 21–32)
CREAT BLD-MCNC: 0.99 MG/DL
DEPRECATED RDW RBC AUTO: 42.6 FL (ref 35.1–46.3)
EGFRCR SERPLBLD CKD-EPI 2021: 63 ML/MIN/1.73M2 (ref 60–?)
EOSINOPHIL # BLD AUTO: 0.58 X10(3) UL (ref 0–0.7)
EOSINOPHIL NFR BLD AUTO: 4.7 %
ERYTHROCYTE [DISTWIDTH] IN BLOOD BY AUTOMATED COUNT: 14.8 % (ref 11–15)
FASTING STATUS PATIENT QL REPORTED: YES
GLUCOSE BLD-MCNC: 191 MG/DL (ref 70–99)
HCT VFR BLD AUTO: 32.1 %
HGB BLD-MCNC: 9.9 G/DL
IMM GRANULOCYTES # BLD AUTO: 0.05 X10(3) UL (ref 0–1)
IMM GRANULOCYTES NFR BLD: 0.4 %
INR BLD: 1.79 (ref 0.8–1.2)
LYMPHOCYTES # BLD AUTO: 0.5 X10(3) UL (ref 1–4)
LYMPHOCYTES NFR BLD AUTO: 4.1 %
MCH RBC QN AUTO: 24.3 PG (ref 26–34)
MCHC RBC AUTO-ENTMCNC: 30.8 G/DL (ref 31–37)
MCV RBC AUTO: 78.9 FL
MONOCYTES # BLD AUTO: 0.98 X10(3) UL (ref 0.1–1)
MONOCYTES NFR BLD AUTO: 8 %
NEUTROPHILS # BLD AUTO: 10.19 X10 (3) UL (ref 1.5–7.7)
NEUTROPHILS # BLD AUTO: 10.19 X10(3) UL (ref 1.5–7.7)
NEUTROPHILS NFR BLD AUTO: 82.6 %
OSMOLALITY SERPL CALC.SUM OF ELEC: 282 MOSM/KG (ref 275–295)
PLATELET # BLD AUTO: 427 10(3)UL (ref 150–450)
POTASSIUM SERPL-SCNC: 3.7 MMOL/L (ref 3.5–5.1)
PROTHROMBIN TIME: 22 SECONDS (ref 11.6–14.8)
RBC # BLD AUTO: 4.07 X10(6)UL
SODIUM SERPL-SCNC: 133 MMOL/L (ref 136–145)
WBC # BLD AUTO: 12.3 X10(3) UL (ref 4–11)

## 2023-12-08 PROCEDURE — 85610 PROTHROMBIN TIME: CPT

## 2023-12-08 PROCEDURE — 36415 COLL VENOUS BLD VENIPUNCTURE: CPT

## 2023-12-08 PROCEDURE — 80048 BASIC METABOLIC PNL TOTAL CA: CPT

## 2023-12-08 PROCEDURE — 85025 COMPLETE CBC W/AUTO DIFF WBC: CPT

## 2023-12-22 VITALS — HEIGHT: 63.75 IN | BODY MASS INDEX: 23.68 KG/M2 | WEIGHT: 137 LBS

## 2023-12-22 RX ORDER — AMIODARONE HYDROCHLORIDE 100 MG/1
100 TABLET ORAL DAILY
COMMUNITY
End: 2023-12-29

## 2023-12-29 ENCOUNTER — HOSPITAL ENCOUNTER (OUTPATIENT)
Dept: INTERVENTIONAL RADIOLOGY/VASCULAR | Facility: HOSPITAL | Age: 63
Discharge: HOME | End: 2023-12-29
Attending: INTERNAL MEDICINE
Payer: COMMERCIAL

## 2023-12-29 ENCOUNTER — HOSPITAL ENCOUNTER (OUTPATIENT)
Dept: INTERVENTIONAL RADIOLOGY/VASCULAR | Facility: HOSPITAL | Age: 63
Discharge: HOME | End: 2023-12-29
Attending: INTERNAL MEDICINE | Admitting: INTERNAL MEDICINE
Payer: COMMERCIAL

## 2023-12-29 ENCOUNTER — HOSPITAL ENCOUNTER (OUTPATIENT)
Dept: CV DIAGNOSTICS | Facility: HOSPITAL | Age: 63
Discharge: HOME | End: 2023-12-29
Attending: INTERNAL MEDICINE
Payer: COMMERCIAL

## 2023-12-29 VITALS
WEIGHT: 137 LBS | HEIGHT: 63.75 IN | RESPIRATION RATE: 20 BRPM | DIASTOLIC BLOOD PRESSURE: 82 MMHG | HEART RATE: 53 BPM | TEMPERATURE: 98 F | OXYGEN SATURATION: 98 % | BODY MASS INDEX: 23.68 KG/M2 | SYSTOLIC BLOOD PRESSURE: 118 MMHG

## 2023-12-29 DIAGNOSIS — I35.0 AORTIC STENOSIS: ICD-10-CM

## 2023-12-29 LAB — GLUCOSE BLDC GLUCOMTR-MCNC: 194 MG/DL (ref 70–99)

## 2023-12-29 PROCEDURE — 93312 ECHO TRANSESOPHAGEAL: CPT

## 2023-12-29 PROCEDURE — 99153 MOD SED SAME PHYS/QHP EA: CPT

## 2023-12-29 PROCEDURE — 93325 DOPPLER ECHO COLOR FLOW MAPG: CPT | Performed by: INTERNAL MEDICINE

## 2023-12-29 PROCEDURE — 99152 MOD SED SAME PHYS/QHP 5/>YRS: CPT | Performed by: INTERNAL MEDICINE

## 2023-12-29 PROCEDURE — 93320 DOPPLER ECHO COMPLETE: CPT | Performed by: INTERNAL MEDICINE

## 2023-12-29 PROCEDURE — 82962 GLUCOSE BLOOD TEST: CPT

## 2023-12-29 PROCEDURE — 99152 MOD SED SAME PHYS/QHP 5/>YRS: CPT

## 2023-12-29 PROCEDURE — B24BZZ4 ULTRASONOGRAPHY OF HEART WITH AORTA, TRANSESOPHAGEAL: ICD-10-PCS | Performed by: STUDENT IN AN ORGANIZED HEALTH CARE EDUCATION/TRAINING PROGRAM

## 2023-12-29 PROCEDURE — 93460 R&L HRT ART/VENTRICLE ANGIO: CPT | Performed by: INTERNAL MEDICINE

## 2023-12-29 PROCEDURE — 36415 COLL VENOUS BLD VENIPUNCTURE: CPT

## 2023-12-29 PROCEDURE — B2151ZZ FLUOROSCOPY OF LEFT HEART USING LOW OSMOLAR CONTRAST: ICD-10-PCS | Performed by: OTOLARYNGOLOGY

## 2023-12-29 PROCEDURE — 4A023N8 MEASUREMENT OF CARDIAC SAMPLING AND PRESSURE, BILATERAL, PERCUTANEOUS APPROACH: ICD-10-PCS | Performed by: OTOLARYNGOLOGY

## 2023-12-29 RX ORDER — SILDENAFIL CITRATE 20 MG/1
20 TABLET ORAL 3 TIMES DAILY
Qty: 90 TABLET | Refills: 3 | Status: SHIPPED | OUTPATIENT
Start: 2023-12-29

## 2023-12-29 RX ORDER — LIDOCAINE HYDROCHLORIDE 20 MG/ML
INJECTION, SOLUTION EPIDURAL; INFILTRATION; INTRACAUDAL; PERINEURAL
Status: COMPLETED
Start: 2023-12-29 | End: 2023-12-29

## 2023-12-29 RX ORDER — CHLORHEXIDINE GLUCONATE 4 G/100ML
30 SOLUTION TOPICAL
Status: DISCONTINUED | OUTPATIENT
Start: 2023-12-29 | End: 2023-12-29

## 2023-12-29 RX ORDER — SODIUM CHLORIDE 0.9 % (FLUSH) 0.9 %
10 SYRINGE (ML) INJECTION AS NEEDED
Status: DISCONTINUED | OUTPATIENT
Start: 2023-12-29 | End: 2023-12-29

## 2023-12-29 RX ORDER — SODIUM CHLORIDE 9 MG/ML
INJECTION, SOLUTION INTRAVENOUS
Status: COMPLETED | OUTPATIENT
Start: 2023-12-29 | End: 2023-12-29

## 2023-12-29 RX ORDER — MIDAZOLAM HYDROCHLORIDE 1 MG/ML
INJECTION INTRAMUSCULAR; INTRAVENOUS
Status: COMPLETED
Start: 2023-12-29 | End: 2023-12-29

## 2023-12-29 RX ORDER — ASPIRIN 81 MG/1
TABLET, CHEWABLE ORAL
Status: DISCONTINUED
Start: 2023-12-29 | End: 2023-12-29

## 2023-12-29 RX ORDER — MIDAZOLAM HYDROCHLORIDE 1 MG/ML
4 INJECTION INTRAMUSCULAR; INTRAVENOUS ONCE
Status: COMPLETED | OUTPATIENT
Start: 2023-12-29 | End: 2023-12-29

## 2023-12-29 RX ORDER — ASPIRIN 81 MG/1
324 TABLET, CHEWABLE ORAL ONCE
Status: COMPLETED | OUTPATIENT
Start: 2023-12-29 | End: 2023-12-29

## 2023-12-29 RX ADMIN — MIDAZOLAM HYDROCHLORIDE 4 MG: 1 INJECTION INTRAMUSCULAR; INTRAVENOUS at 08:45:00

## 2023-12-29 RX ADMIN — ASPIRIN 324 MG: 81 TABLET, CHEWABLE ORAL at 08:04:00

## 2023-12-29 RX ADMIN — SODIUM CHLORIDE: 9 INJECTION, SOLUTION INTRAVENOUS at 08:30:00

## 2023-12-29 NOTE — IVS NOTE
Per patient unable to get accurate blood pressure readings from either arm due to calcification. Best place for blood pressure is her left leg with a small cuff. Also her endocrinologist stated she should not remove her CGM or insulin pump, both are currently on her left arm. Patient also stated she gets vertigo when she lays flat, and may even pass out from laying flat.

## 2023-12-29 NOTE — H&P
[facility]  Cardiology Cath Lab H&P    Richard Brooks Patient Status:  Outpatient    1960 MRN [id number]   Location [facility] Attending Mark Nance MD   Hosp Day # 0 PCP Yuridia Kasper     Patient presents for outpatient angiogram and CECILE secondary to moderate mitral stenosis and aortic stenosis, severe pulmonary hypertension. Shortness of breath has progressed since last visit, increasing oxygen requirement. No chest pain with exertion. Assessment  Valvular heart disease: Moderate mitral stenosis and aortic stenosis on transthoracic echo  CAD: History of distal RCA bifurcation PCI, moderate mid RCA stenosis per angiogram 2021  History of renal transplant x 2 at [facility]  History of factor V Leiden deficiency    Recommendations  Proceed with angiogram      Rex Bourne MD  Interventional Cardiology  [address] [place] [address]  --------------------------------------------------------------------------------------------------------------------------------  ROS 10 systems reviewed, pertinent findings above.   ROS    History:  Past Medical History:   Diagnosis Date    Anemia     Arrhythmia     Clotting disorder ([address]     Clotting Factor 5    Coronary atherosclerosis     DIABETES 1964    Gastroparesis     GERD (gastroesophageal reflux disease)     Heart disease     Heart valve disease     Herpes simplex     High cholesterol     History of carpal tunnel surgery 2009    Hx of laminectomy 2009    HYPERLIPIDEMIA     HYPERTENSION     Kidney replaced by transplant     ,     Left knee dislocation 1986    Renal disorder     Rhabdomyolysis due to statin therapy 2003    Shortness of breath     Spondylolysis 2015    Tendon tear, foot     R foot    Trigger finger, left     L index finger and ring finger    Trigger finger, right little finger     Type 1 diabetes mellitus ([address]      Past Surgical History:   Procedure Laterality Date    BLOOD TRANSFUSIONS      CARDIAC SURG PROCEDURE UNLIST      CAD/Cardiac Stents (Mid LAD +Distal RCA)    CARPAL TUNNEL RELEASE Right 2007    CATARACT EXTRACTION  1993    CHOLECYSTECTOMY  2011    EYE SURGERY  1981    vitrectomy L eye    HAND/FINGER SURGERY UNLISTED      trigger finger relaeases on 3    HYSTEROSCOPY  2010    polypectomy    OTHER SURGICAL HISTORY      see scanned in document provided by patient    SHOULDER SURG PROC UNLISTED  2008    benign tumor removed L shoulder blade    SHOULDER SURG [address]    rotator cuff repair R    SKIN SURGERY  2009    skin cancer excision L wrist    TRANSPLANTATION OF KIDNEY  1996    TUBAL LIGATION  1982     Family History   Problem Relation Age of Onset    Cancer Sister 28        breast    Breast Cancer Sister 28    Ear Problems Mother         hearing aid left ear; transmit    DVT/VTE Mother     Cancer Maternal Aunt         breast    Breast Cancer Maternal Aunt 60      reports that she has never smoked. She has never used smokeless tobacco. She reports that she does not drink alcohol and does not use drugs. Objective: Intake/Output:   No intake or output data in the 24 hours ending 12/29/23 0737    Physical Exam:     General: Alert and oriented x 3. No apparent distress. No respiratory or constitutional distress. HEENT: Normocephalic, anicteric sclera, neck supple. Neck: No JVD, carotids 2+, no bruits. Cardiac: Regular rate and rhythm. S1, S2 normal. No murmur, pericardial rub, S3.  Lungs: Clear without wheezes, rales, rhonchi or dullness. Normal excursions and effort. Abdomen: Soft, non-tender. BS-present. Extremities: Without clubbing, cyanosis or edema. Peripheral pulses are 2+. Neurologic: Alert and oriented, normal affect. Skin: Warm and dry.        Emperatriz Ferrell MD  12/29/2023  7:37 AM

## 2023-12-29 NOTE — PROCEDURES
[facility]    Cardiac Cath Procedure Note    Sherryl Goodpasture Patient Status:  Outpatient    1960 MRN [id number]   Location [facility] Attending Kevin Melgoza MD   Hosp Day # 0 PCP Terri Valdes       Cardiologist: Tad Knapp MD  Primary Proceduralist: Tad Knapp MD  Procedure Performed: LHC, RHC, and LV  Date of Procedure: 2023   Indication: pre-op valve surgery, severe pulm htn    Summary of procedure:    Two-vessel coronary disease, significant diffuse mid RCA stenosis 80%, heavily calcified  Proximal LAD stenosis proximal to prior stent, heavily calcified, 95%. Severe pulmonary hypertension, PA systolic pressure as high as 110 mmHg, transpulmonary gradient of 40 mmHg severely elevated despite likely falsely elevated wedge pressure. Therefore multifactorial pulmonary hypertension that of intrinsic lung disease as well as elevated left atrial pressure from mitral and aortic stenosis. Valvular heart disease: Invasive mean gradient across aortic valve 30 mmHg. Invasive mean gradient across mitral valve (20 mmHg) likely falsely elevated due to inaccurate wedge pressure as estimation of left atrial pressure. Recommendations:  Referral to CV surgery for aortic and mitral valve replacement, bypass of RCA and LAD  Discharge home in 2 hours      Left Ventriculography and hemodynamics:   LV EF not done  LV EDP 15 mmHg  30 mm mean gradient across aortic valve    RA 25  /25  /35 mean 63  PCW 40    CO 4.4/ CI 2.7  SVR 1156/       Coronary Angiography  RCA: Mid RCA heavily calcified, sequential 80 to 90% stenoses. Supplies PDA and PL which are large and has patent bifurcation stent without significant in-stent restenosis. Left main:  Free of obstructive disease    Left anterior descending: Proximal hazy heavily calcified stenosis 95% stenosis.   Remainder of the LAD including the stent is free of obstructive disease, supplies multiple diagonals which are non-obstructive    Circumflex: AV groove circumflex free of obstructive disease, supplies moderate-sized marginal branch to the inferolateral wall, 100% occluded in the distal segment with left to left collaterals filling small distal territory. Description of Procedure:   After written informed consent was obtained from the patient, patient was brought to the cardiac catheterization laboratory. Patient was prepped and draped in the usual sterile fashion. Lidocaine 1% was used to infiltrate the right groin for local anesthesia and a 6 Cameroonian introducer sheath was inserted into the right femoral artery via ultrasound guidance and micropuncture kit. Right femoral vein accessed by palpation. 7 FR sheath placed. Monitoring swan advanced to RA and pressures recorded in RA, RV, PA and wedge positions under fluoroscopic and hemodynamic guidance. Selective coronary angiography performed with JR4 catheter for RCA and JL4 catheter for LCA. Angiography performed in standard projections. 6 Bengali JR4 catheter placed in LV for hemodynamics. Selective right femoral angiogram done assess anatomy for closure. Specimen sent to: No specimen collected  Estimated blood loss: 10 cc  Closure:  Perclose artery, pressure vein      IV was maintained by RN and moderate conscious sedation of versed and fentanyl was given. Patient was assessed and monitoring of oxygen, heart rate and blood pressure by nurse and myself during the exam 35 minutes.       Tayo Sow MD  12/29/23

## 2023-12-29 NOTE — IVS NOTE
DISCHARGE NOTE     Pt is able to sit up and ambulate without difficulty. Pt voided and tolerated fluids and food. Procedural sites remains dry and intact with good circulation, motion, and sensation. No signs and symptoms of bleeding/hematoma noted. IV access removed  Instruction provided, patient/family verbalizes understanding. Dr. Mariama Grady spoke with patient/family post procedure.      Pt discharge via wheelchair to [address]     Follow up Appointment: as already scheduled    New Prescription: [address] office notified for [facility] to order sildenafil per Dr. Zoran Rosales request. [facility] prescription electronically sent to patient's preferred pharmacy by PAM

## 2024-07-09 ENCOUNTER — CARDPULM VISIT (OUTPATIENT)
Dept: CARDIAC REHAB | Facility: HOSPITAL | Age: 64
End: 2024-07-09
Attending: INTERNAL MEDICINE
Payer: COMMERCIAL

## 2024-07-09 ENCOUNTER — ORDER TRANSCRIPTION (OUTPATIENT)
Dept: CARDIAC REHAB | Facility: HOSPITAL | Age: 64
End: 2024-07-09

## 2024-07-09 DIAGNOSIS — Z95.820 S/P ANGIOPLASTY WITH STENT: Primary | ICD-10-CM

## 2024-07-15 ENCOUNTER — CARDPULM VISIT (OUTPATIENT)
Dept: CARDIAC REHAB | Facility: HOSPITAL | Age: 64
End: 2024-07-15
Attending: INTERNAL MEDICINE
Payer: COMMERCIAL

## 2024-07-15 PROCEDURE — 93798 PHYS/QHP OP CAR RHAB W/ECG: CPT

## 2024-07-17 ENCOUNTER — APPOINTMENT (OUTPATIENT)
Dept: CARDIAC REHAB | Facility: HOSPITAL | Age: 64
End: 2024-07-17
Attending: INTERNAL MEDICINE
Payer: COMMERCIAL

## 2024-07-18 ENCOUNTER — APPOINTMENT (OUTPATIENT)
Dept: CARDIAC REHAB | Facility: HOSPITAL | Age: 64
End: 2024-07-18
Payer: COMMERCIAL

## 2024-07-22 ENCOUNTER — APPOINTMENT (OUTPATIENT)
Dept: CARDIAC REHAB | Facility: HOSPITAL | Age: 64
End: 2024-07-22
Payer: COMMERCIAL

## 2024-07-22 ENCOUNTER — CARDPULM VISIT (OUTPATIENT)
Dept: CARDIAC REHAB | Facility: HOSPITAL | Age: 64
End: 2024-07-22
Attending: INTERNAL MEDICINE
Payer: COMMERCIAL

## 2024-07-22 PROCEDURE — 93798 PHYS/QHP OP CAR RHAB W/ECG: CPT

## 2024-07-24 ENCOUNTER — APPOINTMENT (OUTPATIENT)
Dept: CARDIAC REHAB | Facility: HOSPITAL | Age: 64
End: 2024-07-24
Payer: COMMERCIAL

## 2024-07-25 ENCOUNTER — CARDPULM VISIT (OUTPATIENT)
Dept: CARDIAC REHAB | Facility: HOSPITAL | Age: 64
End: 2024-07-25
Attending: INTERNAL MEDICINE
Payer: COMMERCIAL

## 2024-07-25 ENCOUNTER — APPOINTMENT (OUTPATIENT)
Dept: CARDIAC REHAB | Facility: HOSPITAL | Age: 64
End: 2024-07-25
Payer: COMMERCIAL

## 2024-07-25 ENCOUNTER — LAB ENCOUNTER (OUTPATIENT)
Dept: LAB | Age: 64
End: 2024-07-25
Attending: NURSE PRACTITIONER
Payer: COMMERCIAL

## 2024-07-25 DIAGNOSIS — B99.8 IMMUNOSUPPRESSION-RELATED INFECTIOUS DISEASE (HCC): ICD-10-CM

## 2024-07-25 DIAGNOSIS — Z94.0 KIDNEY REPLACED BY TRANSPLANT (HCC): Primary | ICD-10-CM

## 2024-07-25 DIAGNOSIS — Z79.899 NEED FOR PROPHYLACTIC CHEMOTHERAPY: ICD-10-CM

## 2024-07-25 DIAGNOSIS — D84.9 IMMUNOSUPPRESSION-RELATED INFECTIOUS DISEASE (HCC): ICD-10-CM

## 2024-07-25 LAB
BASOPHILS # BLD AUTO: 0.01 X10(3) UL (ref 0–0.2)
BASOPHILS NFR BLD AUTO: 0.2 %
CREAT BLD-MCNC: 0.98 MG/DL
DEPRECATED RDW RBC AUTO: 43.8 FL (ref 35.1–46.3)
EGFRCR SERPLBLD CKD-EPI 2021: 64 ML/MIN/1.73M2 (ref 60–?)
EOSINOPHIL # BLD AUTO: 0.14 X10(3) UL (ref 0–0.7)
EOSINOPHIL NFR BLD AUTO: 2.4 %
ERYTHROCYTE [DISTWIDTH] IN BLOOD BY AUTOMATED COUNT: 16.4 % (ref 11–15)
FASTING PATIENT GLUCOSE ANSWER: YES
GLUCOSE BLD-MCNC: 133 MG/DL (ref 70–99)
HCT VFR BLD AUTO: 39.9 %
HGB BLD-MCNC: 12.5 G/DL
IMM GRANULOCYTES # BLD AUTO: 0.02 X10(3) UL (ref 0–1)
IMM GRANULOCYTES NFR BLD: 0.3 %
LYMPHOCYTES # BLD AUTO: 0.92 X10(3) UL (ref 1–4)
LYMPHOCYTES NFR BLD AUTO: 15.8 %
MCH RBC QN AUTO: 23.8 PG (ref 26–34)
MCHC RBC AUTO-ENTMCNC: 31.3 G/DL (ref 31–37)
MCV RBC AUTO: 76 FL
MONOCYTES # BLD AUTO: 0.58 X10(3) UL (ref 0.1–1)
MONOCYTES NFR BLD AUTO: 9.9 %
NEUTROPHILS # BLD AUTO: 4.16 X10 (3) UL (ref 1.5–7.7)
NEUTROPHILS # BLD AUTO: 4.16 X10(3) UL (ref 1.5–7.7)
NEUTROPHILS NFR BLD AUTO: 71.4 %
PLATELET # BLD AUTO: 271 10(3)UL (ref 150–450)
POTASSIUM SERPL-SCNC: 4.8 MMOL/L (ref 3.5–5.1)
RBC # BLD AUTO: 5.25 X10(6)UL
WBC # BLD AUTO: 5.8 X10(3) UL (ref 4–11)

## 2024-07-25 PROCEDURE — 85025 COMPLETE CBC W/AUTO DIFF WBC: CPT

## 2024-07-25 PROCEDURE — 36415 COLL VENOUS BLD VENIPUNCTURE: CPT

## 2024-07-25 PROCEDURE — 80197 ASSAY OF TACROLIMUS: CPT

## 2024-07-25 PROCEDURE — 82565 ASSAY OF CREATININE: CPT

## 2024-07-25 PROCEDURE — 93798 PHYS/QHP OP CAR RHAB W/ECG: CPT

## 2024-07-25 PROCEDURE — 82947 ASSAY GLUCOSE BLOOD QUANT: CPT

## 2024-07-25 PROCEDURE — 84132 ASSAY OF SERUM POTASSIUM: CPT

## 2024-07-28 LAB — TACROLIMUS LVL: 9.9 NG/ML

## 2024-07-29 ENCOUNTER — APPOINTMENT (OUTPATIENT)
Dept: CARDIAC REHAB | Facility: HOSPITAL | Age: 64
End: 2024-07-29
Payer: COMMERCIAL

## 2024-07-31 ENCOUNTER — APPOINTMENT (OUTPATIENT)
Dept: CARDIAC REHAB | Facility: HOSPITAL | Age: 64
End: 2024-07-31
Payer: COMMERCIAL

## 2024-08-01 ENCOUNTER — APPOINTMENT (OUTPATIENT)
Dept: CARDIAC REHAB | Facility: HOSPITAL | Age: 64
End: 2024-08-01
Payer: COMMERCIAL

## 2024-08-05 ENCOUNTER — APPOINTMENT (OUTPATIENT)
Dept: CARDIAC REHAB | Facility: HOSPITAL | Age: 64
End: 2024-08-05
Attending: INTERNAL MEDICINE
Payer: COMMERCIAL

## 2024-08-05 ENCOUNTER — APPOINTMENT (OUTPATIENT)
Dept: CARDIAC REHAB | Facility: HOSPITAL | Age: 64
End: 2024-08-05
Payer: COMMERCIAL

## 2024-08-07 ENCOUNTER — APPOINTMENT (OUTPATIENT)
Dept: CARDIAC REHAB | Facility: HOSPITAL | Age: 64
End: 2024-08-07
Attending: INTERNAL MEDICINE
Payer: COMMERCIAL

## 2024-08-07 ENCOUNTER — APPOINTMENT (OUTPATIENT)
Dept: CARDIAC REHAB | Facility: HOSPITAL | Age: 64
End: 2024-08-07
Payer: COMMERCIAL

## 2024-08-08 ENCOUNTER — APPOINTMENT (OUTPATIENT)
Dept: CARDIAC REHAB | Facility: HOSPITAL | Age: 64
End: 2024-08-08
Attending: INTERNAL MEDICINE
Payer: COMMERCIAL

## 2024-08-08 ENCOUNTER — APPOINTMENT (OUTPATIENT)
Dept: CARDIAC REHAB | Facility: HOSPITAL | Age: 64
End: 2024-08-08
Payer: COMMERCIAL

## 2024-08-09 ENCOUNTER — LAB ENCOUNTER (OUTPATIENT)
Dept: LAB | Age: 64
End: 2024-08-09
Attending: INTERNAL MEDICINE
Payer: COMMERCIAL

## 2024-08-09 DIAGNOSIS — Z79.899 HIGH RISK MEDICATION USE: ICD-10-CM

## 2024-08-09 DIAGNOSIS — D84.9 IMMUNODEFICIENCY (HCC): ICD-10-CM

## 2024-08-09 DIAGNOSIS — Z94.0 RENAL TRANSPLANT RECIPIENT (HCC): Primary | ICD-10-CM

## 2024-08-09 LAB
BASOPHILS # BLD AUTO: 0.01 X10(3) UL (ref 0–0.2)
BASOPHILS NFR BLD AUTO: 0.2 %
CREAT BLD-MCNC: 1.14 MG/DL
DEPRECATED RDW RBC AUTO: 47.8 FL (ref 35.1–46.3)
EGFRCR SERPLBLD CKD-EPI 2021: 53 ML/MIN/1.73M2 (ref 60–?)
EOSINOPHIL # BLD AUTO: 0.15 X10(3) UL (ref 0–0.7)
EOSINOPHIL NFR BLD AUTO: 2.8 %
ERYTHROCYTE [DISTWIDTH] IN BLOOD BY AUTOMATED COUNT: 17.1 % (ref 11–15)
FASTING PATIENT GLUCOSE ANSWER: YES
GLUCOSE BLD-MCNC: 163 MG/DL (ref 70–99)
HCT VFR BLD AUTO: 35.2 %
HGB BLD-MCNC: 11 G/DL
IMM GRANULOCYTES # BLD AUTO: 0.01 X10(3) UL (ref 0–1)
IMM GRANULOCYTES NFR BLD: 0.2 %
LYMPHOCYTES # BLD AUTO: 0.54 X10(3) UL (ref 1–4)
LYMPHOCYTES NFR BLD AUTO: 9.9 %
MCH RBC QN AUTO: 24.2 PG (ref 26–34)
MCHC RBC AUTO-ENTMCNC: 31.3 G/DL (ref 31–37)
MCV RBC AUTO: 77.5 FL
MONOCYTES # BLD AUTO: 0.43 X10(3) UL (ref 0.1–1)
MONOCYTES NFR BLD AUTO: 7.9 %
NEUTROPHILS # BLD AUTO: 4.29 X10 (3) UL (ref 1.5–7.7)
NEUTROPHILS # BLD AUTO: 4.29 X10(3) UL (ref 1.5–7.7)
NEUTROPHILS NFR BLD AUTO: 79 %
PLATELET # BLD AUTO: 261 10(3)UL (ref 150–450)
POTASSIUM SERPL-SCNC: 4.6 MMOL/L (ref 3.5–5.1)
RBC # BLD AUTO: 4.54 X10(6)UL
WBC # BLD AUTO: 5.4 X10(3) UL (ref 4–11)

## 2024-08-09 PROCEDURE — 82947 ASSAY GLUCOSE BLOOD QUANT: CPT

## 2024-08-09 PROCEDURE — 80197 ASSAY OF TACROLIMUS: CPT

## 2024-08-09 PROCEDURE — 36415 COLL VENOUS BLD VENIPUNCTURE: CPT

## 2024-08-09 PROCEDURE — 85025 COMPLETE CBC W/AUTO DIFF WBC: CPT

## 2024-08-09 PROCEDURE — 84132 ASSAY OF SERUM POTASSIUM: CPT

## 2024-08-09 PROCEDURE — 82565 ASSAY OF CREATININE: CPT

## 2024-08-12 ENCOUNTER — APPOINTMENT (OUTPATIENT)
Dept: CARDIAC REHAB | Facility: HOSPITAL | Age: 64
End: 2024-08-12
Payer: COMMERCIAL

## 2024-08-12 ENCOUNTER — APPOINTMENT (OUTPATIENT)
Dept: CARDIAC REHAB | Facility: HOSPITAL | Age: 64
End: 2024-08-12
Attending: INTERNAL MEDICINE
Payer: COMMERCIAL

## 2024-08-14 ENCOUNTER — APPOINTMENT (OUTPATIENT)
Dept: CARDIAC REHAB | Facility: HOSPITAL | Age: 64
End: 2024-08-14
Payer: COMMERCIAL

## 2024-08-14 ENCOUNTER — APPOINTMENT (OUTPATIENT)
Dept: CARDIAC REHAB | Facility: HOSPITAL | Age: 64
End: 2024-08-14
Attending: INTERNAL MEDICINE
Payer: COMMERCIAL

## 2024-08-14 LAB — TACROLIMUS LVL: 7.1 NG/ML

## 2024-08-15 ENCOUNTER — APPOINTMENT (OUTPATIENT)
Dept: CARDIAC REHAB | Facility: HOSPITAL | Age: 64
End: 2024-08-15
Payer: COMMERCIAL

## 2024-08-15 ENCOUNTER — APPOINTMENT (OUTPATIENT)
Dept: CARDIAC REHAB | Facility: HOSPITAL | Age: 64
End: 2024-08-15
Attending: INTERNAL MEDICINE
Payer: COMMERCIAL

## 2024-08-19 ENCOUNTER — APPOINTMENT (OUTPATIENT)
Dept: CARDIAC REHAB | Facility: HOSPITAL | Age: 64
End: 2024-08-19
Payer: COMMERCIAL

## 2024-08-19 ENCOUNTER — APPOINTMENT (OUTPATIENT)
Dept: CARDIAC REHAB | Facility: HOSPITAL | Age: 64
End: 2024-08-19
Attending: INTERNAL MEDICINE
Payer: COMMERCIAL

## 2024-08-21 ENCOUNTER — APPOINTMENT (OUTPATIENT)
Dept: CARDIAC REHAB | Facility: HOSPITAL | Age: 64
End: 2024-08-21
Attending: INTERNAL MEDICINE
Payer: COMMERCIAL

## 2024-08-21 ENCOUNTER — APPOINTMENT (OUTPATIENT)
Dept: CARDIAC REHAB | Facility: HOSPITAL | Age: 64
End: 2024-08-21
Payer: COMMERCIAL

## 2024-08-22 ENCOUNTER — APPOINTMENT (OUTPATIENT)
Dept: CARDIAC REHAB | Facility: HOSPITAL | Age: 64
End: 2024-08-22
Payer: COMMERCIAL

## 2024-08-22 ENCOUNTER — APPOINTMENT (OUTPATIENT)
Dept: CARDIAC REHAB | Facility: HOSPITAL | Age: 64
End: 2024-08-22
Attending: INTERNAL MEDICINE
Payer: COMMERCIAL

## 2024-08-24 ENCOUNTER — LAB ENCOUNTER (OUTPATIENT)
Dept: LAB | Age: 64
End: 2024-08-24
Payer: COMMERCIAL

## 2024-08-24 DIAGNOSIS — D84.9 IMMUNODEFICIENCY (HCC): ICD-10-CM

## 2024-08-24 DIAGNOSIS — Z94.0 RENAL TRANSPLANT RECIPIENT (HCC): ICD-10-CM

## 2024-08-24 DIAGNOSIS — Z79.899 HIGH RISK MEDICATION USE: ICD-10-CM

## 2024-08-24 LAB
BASOPHILS # BLD AUTO: 0.01 X10(3) UL (ref 0–0.2)
BASOPHILS NFR BLD AUTO: 0.2 %
CREAT BLD-MCNC: 1.14 MG/DL
DEPRECATED RDW RBC AUTO: 46.2 FL (ref 35.1–46.3)
EGFRCR SERPLBLD CKD-EPI 2021: 53 ML/MIN/1.73M2 (ref 60–?)
EOSINOPHIL # BLD AUTO: 0.15 X10(3) UL (ref 0–0.7)
EOSINOPHIL NFR BLD AUTO: 2.5 %
ERYTHROCYTE [DISTWIDTH] IN BLOOD BY AUTOMATED COUNT: 16.8 % (ref 11–15)
FASTING PATIENT GLUCOSE ANSWER: YES
GLUCOSE BLD-MCNC: 193 MG/DL (ref 70–99)
HCT VFR BLD AUTO: 34.3 %
HGB BLD-MCNC: 10.8 G/DL
IMM GRANULOCYTES # BLD AUTO: 0.03 X10(3) UL (ref 0–1)
IMM GRANULOCYTES NFR BLD: 0.5 %
LYMPHOCYTES # BLD AUTO: 0.37 X10(3) UL (ref 1–4)
LYMPHOCYTES NFR BLD AUTO: 6.2 %
MCH RBC QN AUTO: 24.2 PG (ref 26–34)
MCHC RBC AUTO-ENTMCNC: 31.5 G/DL (ref 31–37)
MCV RBC AUTO: 76.9 FL
MONOCYTES # BLD AUTO: 0.47 X10(3) UL (ref 0.1–1)
MONOCYTES NFR BLD AUTO: 7.8 %
NEUTROPHILS # BLD AUTO: 4.97 X10 (3) UL (ref 1.5–7.7)
NEUTROPHILS # BLD AUTO: 4.97 X10(3) UL (ref 1.5–7.7)
NEUTROPHILS NFR BLD AUTO: 82.8 %
PLATELET # BLD AUTO: 286 10(3)UL (ref 150–450)
POTASSIUM SERPL-SCNC: 4.6 MMOL/L (ref 3.5–5.1)
RBC # BLD AUTO: 4.46 X10(6)UL
WBC # BLD AUTO: 6 X10(3) UL (ref 4–11)

## 2024-08-24 PROCEDURE — 85025 COMPLETE CBC W/AUTO DIFF WBC: CPT

## 2024-08-24 PROCEDURE — 80197 ASSAY OF TACROLIMUS: CPT

## 2024-08-24 PROCEDURE — 82565 ASSAY OF CREATININE: CPT

## 2024-08-24 PROCEDURE — 84132 ASSAY OF SERUM POTASSIUM: CPT

## 2024-08-24 PROCEDURE — 82947 ASSAY GLUCOSE BLOOD QUANT: CPT

## 2024-08-24 PROCEDURE — 36415 COLL VENOUS BLD VENIPUNCTURE: CPT

## 2024-08-26 ENCOUNTER — APPOINTMENT (OUTPATIENT)
Dept: CARDIAC REHAB | Facility: HOSPITAL | Age: 64
End: 2024-08-26
Attending: INTERNAL MEDICINE
Payer: COMMERCIAL

## 2024-08-26 ENCOUNTER — APPOINTMENT (OUTPATIENT)
Dept: CARDIAC REHAB | Facility: HOSPITAL | Age: 64
End: 2024-08-26
Payer: COMMERCIAL

## 2024-08-28 ENCOUNTER — APPOINTMENT (OUTPATIENT)
Dept: CARDIAC REHAB | Facility: HOSPITAL | Age: 64
End: 2024-08-28
Attending: INTERNAL MEDICINE
Payer: COMMERCIAL

## 2024-08-28 ENCOUNTER — APPOINTMENT (OUTPATIENT)
Dept: CARDIAC REHAB | Facility: HOSPITAL | Age: 64
End: 2024-08-28
Payer: COMMERCIAL

## 2024-08-28 LAB — TACROLIMUS LVL: 5.2 NG/ML

## 2024-08-29 ENCOUNTER — APPOINTMENT (OUTPATIENT)
Dept: CARDIAC REHAB | Facility: HOSPITAL | Age: 64
End: 2024-08-29
Payer: COMMERCIAL

## 2024-08-29 ENCOUNTER — APPOINTMENT (OUTPATIENT)
Dept: CARDIAC REHAB | Facility: HOSPITAL | Age: 64
End: 2024-08-29
Attending: INTERNAL MEDICINE
Payer: COMMERCIAL

## 2024-09-02 ENCOUNTER — APPOINTMENT (OUTPATIENT)
Dept: CARDIAC REHAB | Facility: HOSPITAL | Age: 64
End: 2024-09-02
Attending: INTERNAL MEDICINE
Payer: COMMERCIAL

## 2024-09-02 ENCOUNTER — APPOINTMENT (OUTPATIENT)
Dept: CARDIAC REHAB | Facility: HOSPITAL | Age: 64
End: 2024-09-02
Payer: COMMERCIAL

## 2024-09-04 ENCOUNTER — APPOINTMENT (OUTPATIENT)
Dept: CARDIAC REHAB | Facility: HOSPITAL | Age: 64
End: 2024-09-04
Attending: INTERNAL MEDICINE
Payer: COMMERCIAL

## 2024-09-04 ENCOUNTER — APPOINTMENT (OUTPATIENT)
Dept: CARDIAC REHAB | Facility: HOSPITAL | Age: 64
End: 2024-09-04
Payer: COMMERCIAL

## 2024-09-05 ENCOUNTER — APPOINTMENT (OUTPATIENT)
Dept: CARDIAC REHAB | Facility: HOSPITAL | Age: 64
End: 2024-09-05
Payer: COMMERCIAL

## 2024-09-05 ENCOUNTER — APPOINTMENT (OUTPATIENT)
Dept: CARDIAC REHAB | Facility: HOSPITAL | Age: 64
End: 2024-09-05
Attending: INTERNAL MEDICINE
Payer: COMMERCIAL

## 2024-09-09 ENCOUNTER — APPOINTMENT (OUTPATIENT)
Dept: CARDIAC REHAB | Facility: HOSPITAL | Age: 64
End: 2024-09-09
Attending: INTERNAL MEDICINE
Payer: COMMERCIAL

## 2024-09-09 ENCOUNTER — APPOINTMENT (OUTPATIENT)
Dept: CARDIAC REHAB | Facility: HOSPITAL | Age: 64
End: 2024-09-09
Payer: COMMERCIAL

## 2024-09-11 ENCOUNTER — APPOINTMENT (OUTPATIENT)
Dept: CARDIAC REHAB | Facility: HOSPITAL | Age: 64
End: 2024-09-11
Attending: INTERNAL MEDICINE
Payer: COMMERCIAL

## 2024-09-11 ENCOUNTER — APPOINTMENT (OUTPATIENT)
Dept: CARDIAC REHAB | Facility: HOSPITAL | Age: 64
End: 2024-09-11
Payer: COMMERCIAL

## 2024-09-12 ENCOUNTER — APPOINTMENT (OUTPATIENT)
Dept: CARDIAC REHAB | Facility: HOSPITAL | Age: 64
End: 2024-09-12
Attending: INTERNAL MEDICINE
Payer: COMMERCIAL

## 2024-09-12 ENCOUNTER — APPOINTMENT (OUTPATIENT)
Dept: CARDIAC REHAB | Facility: HOSPITAL | Age: 64
End: 2024-09-12
Payer: COMMERCIAL

## 2024-09-16 ENCOUNTER — APPOINTMENT (OUTPATIENT)
Dept: CARDIAC REHAB | Facility: HOSPITAL | Age: 64
End: 2024-09-16
Payer: COMMERCIAL

## 2024-09-16 ENCOUNTER — APPOINTMENT (OUTPATIENT)
Dept: CARDIAC REHAB | Facility: HOSPITAL | Age: 64
End: 2024-09-16
Attending: INTERNAL MEDICINE
Payer: COMMERCIAL

## 2024-09-18 ENCOUNTER — APPOINTMENT (OUTPATIENT)
Dept: CARDIAC REHAB | Facility: HOSPITAL | Age: 64
End: 2024-09-18
Attending: INTERNAL MEDICINE
Payer: COMMERCIAL

## 2024-09-18 ENCOUNTER — APPOINTMENT (OUTPATIENT)
Dept: CARDIAC REHAB | Facility: HOSPITAL | Age: 64
End: 2024-09-18
Payer: COMMERCIAL

## 2024-09-19 ENCOUNTER — APPOINTMENT (OUTPATIENT)
Dept: CARDIAC REHAB | Facility: HOSPITAL | Age: 64
End: 2024-09-19
Attending: INTERNAL MEDICINE
Payer: COMMERCIAL

## 2024-09-19 ENCOUNTER — APPOINTMENT (OUTPATIENT)
Dept: CARDIAC REHAB | Facility: HOSPITAL | Age: 64
End: 2024-09-19
Payer: COMMERCIAL

## 2024-09-23 ENCOUNTER — APPOINTMENT (OUTPATIENT)
Dept: CARDIAC REHAB | Facility: HOSPITAL | Age: 64
End: 2024-09-23
Attending: INTERNAL MEDICINE
Payer: COMMERCIAL

## 2024-09-23 ENCOUNTER — APPOINTMENT (OUTPATIENT)
Dept: CARDIAC REHAB | Facility: HOSPITAL | Age: 64
End: 2024-09-23
Payer: COMMERCIAL

## 2024-09-25 ENCOUNTER — APPOINTMENT (OUTPATIENT)
Dept: CARDIAC REHAB | Facility: HOSPITAL | Age: 64
End: 2024-09-25
Payer: COMMERCIAL

## 2024-09-25 ENCOUNTER — APPOINTMENT (OUTPATIENT)
Dept: CARDIAC REHAB | Facility: HOSPITAL | Age: 64
End: 2024-09-25
Attending: INTERNAL MEDICINE
Payer: COMMERCIAL

## 2024-09-26 ENCOUNTER — APPOINTMENT (OUTPATIENT)
Dept: CARDIAC REHAB | Facility: HOSPITAL | Age: 64
End: 2024-09-26
Attending: INTERNAL MEDICINE
Payer: COMMERCIAL

## 2024-09-26 ENCOUNTER — APPOINTMENT (OUTPATIENT)
Dept: CARDIAC REHAB | Facility: HOSPITAL | Age: 64
End: 2024-09-26
Payer: COMMERCIAL

## 2024-09-30 ENCOUNTER — APPOINTMENT (OUTPATIENT)
Dept: CARDIAC REHAB | Facility: HOSPITAL | Age: 64
End: 2024-09-30
Payer: COMMERCIAL

## 2024-09-30 ENCOUNTER — APPOINTMENT (OUTPATIENT)
Dept: CARDIAC REHAB | Facility: HOSPITAL | Age: 64
End: 2024-09-30
Attending: INTERNAL MEDICINE
Payer: COMMERCIAL

## 2024-10-02 ENCOUNTER — APPOINTMENT (OUTPATIENT)
Dept: CARDIAC REHAB | Facility: HOSPITAL | Age: 64
End: 2024-10-02
Payer: COMMERCIAL

## 2024-10-02 ENCOUNTER — APPOINTMENT (OUTPATIENT)
Dept: CARDIAC REHAB | Facility: HOSPITAL | Age: 64
End: 2024-10-02
Attending: INTERNAL MEDICINE
Payer: COMMERCIAL

## 2024-10-03 ENCOUNTER — APPOINTMENT (OUTPATIENT)
Dept: CARDIAC REHAB | Facility: HOSPITAL | Age: 64
End: 2024-10-03
Attending: INTERNAL MEDICINE
Payer: COMMERCIAL

## 2024-10-03 ENCOUNTER — APPOINTMENT (OUTPATIENT)
Dept: CARDIAC REHAB | Facility: HOSPITAL | Age: 64
End: 2024-10-03
Payer: COMMERCIAL

## 2024-10-07 ENCOUNTER — APPOINTMENT (OUTPATIENT)
Dept: CARDIAC REHAB | Facility: HOSPITAL | Age: 64
End: 2024-10-07
Attending: INTERNAL MEDICINE
Payer: COMMERCIAL

## 2024-10-07 ENCOUNTER — APPOINTMENT (OUTPATIENT)
Dept: CARDIAC REHAB | Facility: HOSPITAL | Age: 64
End: 2024-10-07
Payer: COMMERCIAL

## 2024-10-09 ENCOUNTER — APPOINTMENT (OUTPATIENT)
Dept: CARDIAC REHAB | Facility: HOSPITAL | Age: 64
End: 2024-10-09
Attending: INTERNAL MEDICINE
Payer: COMMERCIAL

## 2024-10-09 ENCOUNTER — APPOINTMENT (OUTPATIENT)
Dept: CARDIAC REHAB | Facility: HOSPITAL | Age: 64
End: 2024-10-09
Payer: COMMERCIAL

## 2024-10-10 ENCOUNTER — APPOINTMENT (OUTPATIENT)
Dept: CARDIAC REHAB | Facility: HOSPITAL | Age: 64
End: 2024-10-10
Attending: INTERNAL MEDICINE
Payer: COMMERCIAL

## 2024-10-10 ENCOUNTER — APPOINTMENT (OUTPATIENT)
Dept: CARDIAC REHAB | Facility: HOSPITAL | Age: 64
End: 2024-10-10
Payer: COMMERCIAL

## 2024-10-14 ENCOUNTER — APPOINTMENT (OUTPATIENT)
Dept: CARDIAC REHAB | Facility: HOSPITAL | Age: 64
End: 2024-10-14
Attending: INTERNAL MEDICINE
Payer: COMMERCIAL

## 2024-10-14 ENCOUNTER — APPOINTMENT (OUTPATIENT)
Dept: CARDIAC REHAB | Facility: HOSPITAL | Age: 64
End: 2024-10-14
Payer: COMMERCIAL

## 2024-10-16 ENCOUNTER — APPOINTMENT (OUTPATIENT)
Dept: CARDIAC REHAB | Facility: HOSPITAL | Age: 64
End: 2024-10-16
Attending: INTERNAL MEDICINE
Payer: COMMERCIAL

## 2024-10-16 ENCOUNTER — APPOINTMENT (OUTPATIENT)
Dept: CARDIAC REHAB | Facility: HOSPITAL | Age: 64
End: 2024-10-16
Payer: COMMERCIAL

## 2024-10-17 ENCOUNTER — APPOINTMENT (OUTPATIENT)
Dept: CARDIAC REHAB | Facility: HOSPITAL | Age: 64
End: 2024-10-17
Attending: INTERNAL MEDICINE
Payer: COMMERCIAL

## 2024-10-17 ENCOUNTER — APPOINTMENT (OUTPATIENT)
Dept: CARDIAC REHAB | Facility: HOSPITAL | Age: 64
End: 2024-10-17
Payer: COMMERCIAL

## 2024-10-21 ENCOUNTER — APPOINTMENT (OUTPATIENT)
Dept: CARDIAC REHAB | Facility: HOSPITAL | Age: 64
End: 2024-10-21
Payer: COMMERCIAL

## 2024-10-21 ENCOUNTER — APPOINTMENT (OUTPATIENT)
Dept: CARDIAC REHAB | Facility: HOSPITAL | Age: 64
End: 2024-10-21
Attending: INTERNAL MEDICINE
Payer: COMMERCIAL

## 2024-10-23 ENCOUNTER — APPOINTMENT (OUTPATIENT)
Dept: CARDIAC REHAB | Facility: HOSPITAL | Age: 64
End: 2024-10-23
Payer: COMMERCIAL

## 2024-10-23 ENCOUNTER — APPOINTMENT (OUTPATIENT)
Dept: CARDIAC REHAB | Facility: HOSPITAL | Age: 64
End: 2024-10-23
Attending: INTERNAL MEDICINE
Payer: COMMERCIAL

## 2024-10-24 ENCOUNTER — APPOINTMENT (OUTPATIENT)
Dept: CARDIAC REHAB | Facility: HOSPITAL | Age: 64
End: 2024-10-24
Attending: INTERNAL MEDICINE
Payer: COMMERCIAL

## 2024-10-24 ENCOUNTER — APPOINTMENT (OUTPATIENT)
Dept: CARDIAC REHAB | Facility: HOSPITAL | Age: 64
End: 2024-10-24
Payer: COMMERCIAL

## 2024-11-11 ENCOUNTER — ORDER TRANSCRIPTION (OUTPATIENT)
Dept: CARDIAC REHAB | Facility: HOSPITAL | Age: 64
End: 2024-11-11

## 2024-11-11 DIAGNOSIS — Z95.820 S/P ANGIOPLASTY WITH STENT: Primary | ICD-10-CM

## 2024-11-13 ENCOUNTER — APPOINTMENT (OUTPATIENT)
Dept: CARDIAC REHAB | Facility: HOSPITAL | Age: 64
End: 2024-11-13
Attending: INTERNAL MEDICINE
Payer: COMMERCIAL

## 2025-02-20 NOTE — DISCHARGE SUMMARY
General Medicine Discharge Summary     Patient ID:  Bobbi Olvera  59year old  2/6/1960    Admit date: 12/23/2021    Discharge date and time: 12/24/2021    Attending Physician: Akosua Fulton MD     Consults: IP CONSULT TO ENDOCRINOLOGY    Primary Care y sinus  - continue eliquis  - cardiology fu  - continue BB on DC     Left arm pain /angina / hs of CAD  -cardiac alert called, Cardiology evaluated  - trops negative  - recent Angio in July with stenting  - continue effient, statin  - cards did not this [facility], Diabetes  Contact information:  [address] [address]             Rodo Rosales MD. Schedule an appointment as soon as possible for a visit in 1 week.     Specialties: Interventional, Cardiology, CARDIOLOGY never

## (undated) NOTE — LETTER
[address], [place]  Authorization for Invasive Procedures  1.  I hereby authorize Dr. Rustam Wilkes , my physician and whomever may be designated as the doctor's assistant, to perform the following operation and/or procedure:  Josh Clayton occur: fever and allergic reactions, hemolytic reactions, transmission of disease such as hepatitis, AIDS, cytomegalovirus (CMV), and flluid overload.  In the event that I wish to have autologous transfusions of my own blood, or a directed donor transfusion Signature of Patient:  ________________________________________________ Date: _________Time: _________    Responsible person in case of minor or unconscious: _____________________________Relationship: ____________     Witness Signature: _______________

## (undated) NOTE — LETTER
[address] [place] [address]  Authorization for Invasive Procedures  1.  I hereby authorize Dr. Selena Mercado , my physician and whomever may be designated as the doctor's assistant, to perform the following operation and/or procedure:  Rennis Habermann can occur: fever and allergic reactions, hemolytic reactions, transmission of disease such as hepatitis, AIDS, cytomegalovirus (CMV), and flluid overload.  In the event that I wish to have autologous transfusions of my own blood, or a directed donor transfu physician.      Signature of Patient:  ________________________________________________ Date: _________Time: _________    Responsible person in case of minor or unconscious: _____________________________Relationship: ____________     Witness Signature: ____

## (undated) NOTE — Clinical Note
Demetrius,I saw Christiano Montero. I am going to try to give her Infed 1000 mg. Do you think she needs a GI work up for the iron deficiency. I dont think that she has had a colonoscopy for about 7 years.  She had an EGD in 2016. - Angelo Centeno

## (undated) NOTE — LETTER
[facility], [place]  Authorization for Invasive Procedures  1.  I hereby authorize Dr. Prakash Kim , my physician and whomever may be designated as the doctor's assistant, to perform the following operation and/or procedure:  Verenice Duncan allergic reactions, hemolytic reactions, transmission of disease such as hepatitis, AIDS, cytomegalovirus (CMV), and flluid overload.  In the event that I wish to have autologous transfusions of my own blood, or a directed donor transfusion, I will discuss Patient:  ________________________________________________ Date: _________Time: _________    Responsible person in case of minor or unconscious: _____________________________Relationship: ____________     Witness Signature: ________________________________

## (undated) NOTE — MR AVS SNAPSHOT
[facility], [place] [id number] [facility], [address] [address] [phone]               Thank you for choosing us for your health care visit with Yasmin Bravo MD.  We are glad to serve you and happy to provide you with this Take by mouth daily. ezetimibe 10 MG Tabs   Take 10 mg by mouth nightly.    Commonly known as:  ZETIA           FREESTYLE TEST Strp   Generic drug:  Glucose Blood   TEST 4 TO 6 TIMES D           insulin aspart 100 UNIT/ML Soln   Inject into the sk

## (undated) NOTE — LETTER
[address], [place]  Authorization for Invasive Procedures  1.  I hereby authorize Mich Wadsworth , my physician and whomever may be designated as the doctor's assistant, to perform the following operation and/or procedure: coronary ca risks that can occur: fever and allergic reactions, hemolytic reactions, transmission of disease such as hepatitis, AIDS, cytomegalovirus (CMV), and flluid overload.  In the event that I wish to have autologous transfusions of my own blood, or a directed do of my physician.      Signature of Patient:  ________________________________________________ Date: _________Time: _________    Responsible person in case of minor or unconscious: _____________________________Relationship: ____________     Witness Signature